# Patient Record
Sex: FEMALE | Race: WHITE | Employment: OTHER
[De-identification: names, ages, dates, MRNs, and addresses within clinical notes are randomized per-mention and may not be internally consistent; named-entity substitution may affect disease eponyms.]

---

## 2017-05-30 ENCOUNTER — RX ONLY (RX ONLY)
Age: 62
End: 2017-05-30

## 2017-05-30 ENCOUNTER — DOCTOR'S OFFICE (OUTPATIENT)
Dept: URBAN - NONMETROPOLITAN AREA CLINIC 1 | Facility: CLINIC | Age: 62
Setting detail: OPHTHALMOLOGY
End: 2017-05-30
Payer: COMMERCIAL

## 2017-05-30 DIAGNOSIS — H25.13: ICD-10-CM

## 2017-05-30 DIAGNOSIS — H43.813: ICD-10-CM

## 2017-05-30 DIAGNOSIS — H16.222: ICD-10-CM

## 2017-05-30 DIAGNOSIS — H16.223: ICD-10-CM

## 2017-05-30 DIAGNOSIS — H40.003: ICD-10-CM

## 2017-05-30 DIAGNOSIS — H40.033: ICD-10-CM

## 2017-05-30 PROCEDURE — 92014 COMPRE OPH EXAM EST PT 1/>: CPT | Performed by: OPHTHALMOLOGY

## 2017-05-30 PROCEDURE — 83861 MICROFLUID ANALY TEARS: CPT | Performed by: OPHTHALMOLOGY

## 2017-05-30 ASSESSMENT — REFRACTION_MANIFEST
OD_VA3: 20/
OD_VA3: 20/
OD_AXIS: 120
OD_VA2: 20/25-2
OD_VA1: 20/25-2
OS_VA1: 20/
OD_SPHERE: PLANO
OU_VA: 20/
OS_ADD: +2.50
OU_VA: 20/
OD_VA1: 20/
OD_VA2: 20/
OD_ADD: +2.50
OS_SPHERE: PLANO
OS_VA3: 20/
OS_VA1: 20/25-2
OD_VA3: 20/
OD_VA2: 20/
OS_VA3: 20/
OU_VA: 20/
OS_VA3: 20/
OS_AXIS: 061
OS_VA2: 20/25-2
OS_VA2: 20/
OD_CYLINDER: -2.50
OD_VA1: 20/
OS_CYLINDER: -1.50
OS_VA2: 20/
OS_VA1: 20/

## 2017-05-30 ASSESSMENT — CONFRONTATIONAL VISUAL FIELD TEST (CVF)
OD_FINDINGS: FULL
OS_FINDINGS: FULL

## 2017-05-30 ASSESSMENT — REFRACTION_CURRENTRX
OS_AXIS: 77
OD_ADD: +2.50
OD_AXIS: 119
OS_VPRISM_DIRECTION: PROGS
OS_ADD: +2.50
OD_CYLINDER: -2.25
OS_SPHERE: +0.25
OD_SPHERE: +0.25
OD_OVR_VA: 20/
OS_OVR_VA: 20/
OD_OVR_VA: 20/
OS_OVR_VA: 20/
OS_CYLINDER: -1.25
OD_VPRISM_DIRECTION: PROGS
OD_OVR_VA: 20/
OS_OVR_VA: 20/

## 2017-05-30 ASSESSMENT — SPHEQUIV_DERIVED
OS_SPHEQUIV: -0.125
OD_SPHEQUIV: -0.75

## 2017-05-30 ASSESSMENT — VISUAL ACUITY
OS_BCVA: 20/40+2
OD_BCVA: 20/40-2

## 2017-05-30 ASSESSMENT — REFRACTION_AUTOREFRACTION
OS_SPHERE: +0.50
OS_CYLINDER: -1.25
OD_CYLINDER: -2.50
OD_AXIS: 120
OD_SPHERE: +0.50
OS_AXIS: 61

## 2017-06-05 ENCOUNTER — DOCTOR'S OFFICE (OUTPATIENT)
Dept: URBAN - NONMETROPOLITAN AREA CLINIC 1 | Facility: CLINIC | Age: 62
Setting detail: OPHTHALMOLOGY
End: 2017-06-05
Payer: COMMERCIAL

## 2017-06-05 ENCOUNTER — OPTICAL OFFICE (OUTPATIENT)
Dept: URBAN - NONMETROPOLITAN AREA CLINIC 4 | Facility: CLINIC | Age: 62
Setting detail: OPHTHALMOLOGY
End: 2017-06-05
Payer: COMMERCIAL

## 2017-06-05 DIAGNOSIS — H52.4: ICD-10-CM

## 2017-06-05 DIAGNOSIS — H52.223: ICD-10-CM

## 2017-06-05 PROCEDURE — V2750 ANTI-REFLECTIVE COATING: HCPCS | Performed by: OPTOMETRIST

## 2017-06-05 PROCEDURE — V2020 VISION SVCS FRAMES PURCHASES: HCPCS | Performed by: OPTOMETRIST

## 2017-06-05 PROCEDURE — 92015 DETERMINE REFRACTIVE STATE: CPT | Performed by: OPTOMETRIST

## 2017-06-05 PROCEDURE — V2781 PROGRESSIVE LENS PER LENS: HCPCS | Performed by: OPTOMETRIST

## 2017-06-05 ASSESSMENT — REFRACTION_AUTOREFRACTION
OS_AXIS: 61
OS_CYLINDER: -1.25
OD_AXIS: 120
OS_SPHERE: +0.50
OD_SPHERE: +0.50
OD_CYLINDER: -2.50

## 2017-06-05 ASSESSMENT — VISUAL ACUITY
OS_BCVA: 20/40+2
OD_BCVA: 20/40-2

## 2017-06-05 ASSESSMENT — REFRACTION_MANIFEST
OS_VA3: 20/
OD_VA3: 20/
OD_VA1: 20/
OD_VA3: 20/
OS_VA1: 20/
OS_VA2: 20/
OU_VA: 20/
OS_VA1: 20/
OD_VA2: 20/
OD_VA1: 20/
OD_VA2: 20/
OS_VA2: 20/
OS_VA3: 20/
OU_VA: 20/

## 2017-06-05 ASSESSMENT — REFRACTION_OUTSIDERX
OS_CYLINDER: -1.50
OD_VA2: 20/25-2
OD_AXIS: 120
OS_ADD: +2.50
OS_AXIS: 061
OS_SPHERE: +0.50
OD_VA1: 20/25-2
OS_VA1: 20/25-2
OU_VA: 20/
OS_VA3: 20/
OD_SPHERE: +0.50
OD_VA3: 20/
OD_CYLINDER: -2.50
OD_ADD: +2.50
OS_VA2: 20/25-2

## 2017-06-05 ASSESSMENT — REFRACTION_CURRENTRX
OS_SPHERE: +0.25
OS_OVR_VA: 20/
OS_VPRISM_DIRECTION: PROGS
OD_OVR_VA: 20/
OD_VPRISM_DIRECTION: PROGS
OD_SPHERE: +0.25
OS_OVR_VA: 20/
OD_OVR_VA: 20/
OS_CYLINDER: -1.25
OD_ADD: +2.50
OS_ADD: +2.50
OD_OVR_VA: 20/
OS_OVR_VA: 20/
OD_CYLINDER: -2.25
OD_AXIS: 119
OS_AXIS: 77

## 2017-06-05 ASSESSMENT — SPHEQUIV_DERIVED
OS_SPHEQUIV: -0.125
OD_SPHEQUIV: -0.75

## 2017-09-22 ENCOUNTER — OPTICAL OFFICE (OUTPATIENT)
Dept: URBAN - NONMETROPOLITAN AREA CLINIC 4 | Facility: CLINIC | Age: 62
Setting detail: OPHTHALMOLOGY
End: 2017-09-22

## 2017-09-22 DIAGNOSIS — H52.223: ICD-10-CM

## 2017-09-22 PROCEDURE — V2781 PROGRESSIVE LENS PER LENS: HCPCS | Performed by: OPTOMETRIST

## 2017-09-22 PROCEDURE — V2750 ANTI-REFLECTIVE COATING: HCPCS | Performed by: OPTOMETRIST

## 2018-08-21 ENCOUNTER — OPTICAL OFFICE (OUTPATIENT)
Dept: URBAN - NONMETROPOLITAN AREA CLINIC 4 | Facility: CLINIC | Age: 63
Setting detail: OPHTHALMOLOGY
End: 2018-08-21
Payer: COMMERCIAL

## 2018-08-21 ENCOUNTER — DOCTOR'S OFFICE (OUTPATIENT)
Dept: URBAN - NONMETROPOLITAN AREA CLINIC 1 | Facility: CLINIC | Age: 63
Setting detail: OPHTHALMOLOGY
End: 2018-08-21
Payer: COMMERCIAL

## 2018-08-21 DIAGNOSIS — H52.4: ICD-10-CM

## 2018-08-21 DIAGNOSIS — H52.223: ICD-10-CM

## 2018-08-21 PROBLEM — H43.813 POST VITREOUS DETACHMENT: Status: RESOLVED | Noted: 2017-05-30 | Resolved: 2018-08-21

## 2018-08-21 PROCEDURE — 92014 COMPRE OPH EXAM EST PT 1/>: CPT | Performed by: OPTOMETRIST

## 2018-08-21 PROCEDURE — V2750 ANTI-REFLECTIVE COATING: HCPCS | Performed by: OPTOMETRIST

## 2018-08-21 PROCEDURE — V2781 PROGRESSIVE LENS PER LENS: HCPCS | Performed by: OPTOMETRIST

## 2018-08-21 PROCEDURE — V2020 VISION SVCS FRAMES PURCHASES: HCPCS | Performed by: OPTOMETRIST

## 2018-08-21 ASSESSMENT — REFRACTION_OUTSIDERX
OD_AXIS: 120
OS_VA1: 20/25-2
OS_ADD: +2.50
OD_VA2: 20/25-2
OS_VA2: 20/25-2
OS_AXIS: 061
OS_VA3: 20/
OD_ADD: +2.50
OD_VA1: 20/25-2
OS_CYLINDER: -1.50
OD_SPHERE: +0.50
OU_VA: 20/
OD_VA3: 20/
OD_CYLINDER: -2.50
OS_SPHERE: +0.50

## 2018-08-21 ASSESSMENT — REFRACTION_CURRENTRX
OD_SPHERE: +0.50
OS_OVR_VA: 20/
OS_OVR_VA: 20/
OD_ADD: +2.50
OD_OVR_VA: 20/
OD_OVR_VA: 20/
OD_CYLINDER: -2.50
OS_VPRISM_DIRECTION: PROGS
OD_OVR_VA: 20/
OS_AXIS: 058
OD_VPRISM_DIRECTION: PROGS
OD_AXIS: 115
OS_SPHERE: +0.50
OS_ADD: +2.50
OS_CYLINDER: -1.50
OS_OVR_VA: 20/

## 2018-08-21 ASSESSMENT — VISUAL ACUITY
OS_BCVA: 20/30-1
OD_BCVA: 20/40-1

## 2018-08-21 ASSESSMENT — REFRACTION_MANIFEST
OS_VA1: 20/
OS_VA2: 20/
OD_VA1: 20/
OS_VA3: 20/
OS_VA3: 20/
OD_VA2: 20/
OD_VA2: 20/
OS_VA1: 20/
OS_VA2: 20/
OU_VA: 20/
OD_VA1: 20/
OU_VA: 20/
OD_VA3: 20/
OD_VA3: 20/

## 2018-08-21 ASSESSMENT — REFRACTION_AUTOREFRACTION
OD_SPHERE: +1.00
OS_AXIS: 071
OS_SPHERE: +0.75
OS_CYLINDER: -1.00
OD_AXIS: 120
OD_CYLINDER: -2.00

## 2018-08-21 ASSESSMENT — SUPERFICIAL PUNCTATE KERATITIS (SPK)
OD_SPK: 1+ 2+
OS_SPK: 1+ 2+

## 2018-08-21 ASSESSMENT — SPHEQUIV_DERIVED
OS_SPHEQUIV: 0.25
OD_SPHEQUIV: 0

## 2018-08-21 ASSESSMENT — CONFRONTATIONAL VISUAL FIELD TEST (CVF)
OS_FINDINGS: FULL
OD_FINDINGS: FULL

## 2019-07-09 ENCOUNTER — DOCTOR'S OFFICE (OUTPATIENT)
Dept: URBAN - NONMETROPOLITAN AREA CLINIC 1 | Facility: CLINIC | Age: 64
Setting detail: OPHTHALMOLOGY
End: 2019-07-09
Payer: COMMERCIAL

## 2019-07-09 DIAGNOSIS — H40.003: ICD-10-CM

## 2019-07-09 PROCEDURE — 92083 EXTENDED VISUAL FIELD XM: CPT | Performed by: OPTOMETRIST

## 2019-07-09 PROCEDURE — 76514 ECHO EXAM OF EYE THICKNESS: CPT | Performed by: OPTOMETRIST

## 2019-08-26 ENCOUNTER — OPTICAL OFFICE (OUTPATIENT)
Dept: URBAN - NONMETROPOLITAN AREA CLINIC 4 | Facility: CLINIC | Age: 64
Setting detail: OPHTHALMOLOGY
End: 2019-08-26
Payer: COMMERCIAL

## 2019-08-26 ENCOUNTER — OPTICAL OFFICE (OUTPATIENT)
Dept: URBAN - NONMETROPOLITAN AREA CLINIC 4 | Facility: CLINIC | Age: 64
Setting detail: OPHTHALMOLOGY
End: 2019-08-26

## 2019-08-26 ENCOUNTER — DOCTOR'S OFFICE (OUTPATIENT)
Dept: URBAN - NONMETROPOLITAN AREA CLINIC 1 | Facility: CLINIC | Age: 64
Setting detail: OPHTHALMOLOGY
End: 2019-08-26
Payer: COMMERCIAL

## 2019-08-26 DIAGNOSIS — H52.223: ICD-10-CM

## 2019-08-26 DIAGNOSIS — H52.4: ICD-10-CM

## 2019-08-26 PROCEDURE — V2781 PROGRESSIVE LENS PER LENS: HCPCS | Performed by: OPTOMETRIST

## 2019-08-26 PROCEDURE — V2203 LENS SPHCYL BIFOCAL 4.00D/.1: HCPCS | Performed by: OPTOMETRIST

## 2019-08-26 PROCEDURE — 92014 COMPRE OPH EXAM EST PT 1/>: CPT | Performed by: OPTOMETRIST

## 2019-08-26 PROCEDURE — V2750 ANTI-REFLECTIVE COATING: HCPCS | Performed by: OPTOMETRIST

## 2019-08-26 PROCEDURE — V2762 POLARIZATION, ANY LENS: HCPCS | Performed by: OPTOMETRIST

## 2019-08-26 PROCEDURE — V2204 LENS SPHCY BIFOCAL 4.00D/2.1: HCPCS | Performed by: OPTOMETRIST

## 2019-08-26 PROCEDURE — V2020 VISION SVCS FRAMES PURCHASES: HCPCS | Performed by: OPTOMETRIST

## 2019-08-26 ASSESSMENT — REFRACTION_MANIFEST
OD_ADD: +2.50
OD_VA1: 20/
OS_VA3: 20/
OD_SPHERE: +0.75
OS_VA1: 20/
OS_CYLINDER: -1.50
OD_VA2: 20/25-2
OU_VA: 20/
OS_VA2: 20/30-2
OD_CYLINDER: -2.75
OD_VA3: 20/
OS_SPHERE: +0.50
OD_VA2: 20/
OS_AXIS: 061
OU_VA: 20/
OS_VA3: 20/
OD_VA1: 20/30+2
OS_VA2: 20/
OS_VA1: 20/30-2
OD_VA3: 20/
OS_ADD: +2.50
OD_AXIS: 120

## 2019-08-26 ASSESSMENT — REFRACTION_CURRENTRX
OS_VPRISM_DIRECTION: PROGS
OS_OVR_VA: 20/
OS_ADD: +2.50
OD_ADD: +2.50
OS_OVR_VA: 20/
OS_CYLINDER: -1.75
OS_SPHERE: +1.50
OS_OVR_VA: 20/
OD_SPHERE: +0.50
OD_OVR_VA: 20/
OD_OVR_VA: 20/
OD_VPRISM_DIRECTION: PROGS
OD_CYLINDER: -2.50
OD_AXIS: 117
OS_AXIS: 087
OD_OVR_VA: 20/

## 2019-08-26 ASSESSMENT — CONFRONTATIONAL VISUAL FIELD TEST (CVF)
OD_FINDINGS: FULL
OS_FINDINGS: FULL

## 2019-08-26 ASSESSMENT — SUPERFICIAL PUNCTATE KERATITIS (SPK)
OS_SPK: 1+ 2+
OD_SPK: 1+ 2+

## 2019-08-26 ASSESSMENT — VISUAL ACUITY
OD_BCVA: 20/40
OS_BCVA: 20/30

## 2019-08-26 ASSESSMENT — SPHEQUIV_DERIVED
OS_SPHEQUIV: -0.25
OS_SPHEQUIV: -0.25
OD_SPHEQUIV: -0.625
OD_SPHEQUIV: -0.25

## 2019-08-26 ASSESSMENT — REFRACTION_AUTOREFRACTION
OS_AXIS: 061
OD_SPHERE: +1.25
OD_AXIS: 121
OS_SPHERE: +0.50
OS_CYLINDER: -1.50
OD_CYLINDER: -3.00

## 2020-01-29 ENCOUNTER — HOSPITAL ENCOUNTER (OUTPATIENT)
Facility: HOSPITAL | Age: 65
Setting detail: OBSERVATION
Discharge: HOME | End: 2020-01-30
Attending: EMERGENCY MEDICINE | Admitting: INTERNAL MEDICINE
Payer: COMMERCIAL

## 2020-01-29 ENCOUNTER — APPOINTMENT (EMERGENCY)
Dept: RADIOLOGY | Facility: HOSPITAL | Age: 65
End: 2020-01-29
Payer: COMMERCIAL

## 2020-01-29 DIAGNOSIS — R07.9 CHEST PAIN: Primary | ICD-10-CM

## 2020-01-29 PROBLEM — E78.5 HYPERLIPIDEMIA: Status: ACTIVE | Noted: 2020-01-29

## 2020-01-29 PROBLEM — Z86.79 HISTORY OF CEREBRAL ANEURYSM REPAIR: Status: ACTIVE | Noted: 2020-01-29

## 2020-01-29 PROBLEM — Z98.890 HISTORY OF CEREBRAL ANEURYSM REPAIR: Status: ACTIVE | Noted: 2020-01-29

## 2020-01-29 PROBLEM — Z72.0 TOBACCO ABUSE: Status: ACTIVE | Noted: 2020-01-29

## 2020-01-29 LAB
ALBUMIN SERPL BCP-MCNC: 3.9 G/DL (ref 3.5–5)
ALP SERPL-CCNC: 74 U/L (ref 46–116)
ALT SERPL W P-5'-P-CCNC: 41 U/L (ref 12–78)
ANION GAP SERPL CALCULATED.3IONS-SCNC: 9 MMOL/L (ref 4–13)
AST SERPL W P-5'-P-CCNC: 24 U/L (ref 5–45)
BASOPHILS # BLD AUTO: 0.06 THOUSANDS/ΜL (ref 0–0.1)
BASOPHILS NFR BLD AUTO: 1 % (ref 0–1)
BILIRUB SERPL-MCNC: 0.38 MG/DL (ref 0.2–1)
BUN SERPL-MCNC: 20 MG/DL (ref 5–25)
CALCIUM SERPL-MCNC: 8.6 MG/DL (ref 8.3–10.1)
CHLORIDE SERPL-SCNC: 103 MMOL/L (ref 100–108)
CO2 SERPL-SCNC: 27 MMOL/L (ref 21–32)
CREAT SERPL-MCNC: 0.6 MG/DL (ref 0.6–1.3)
EOSINOPHIL # BLD AUTO: 0.2 THOUSAND/ΜL (ref 0–0.61)
EOSINOPHIL NFR BLD AUTO: 2 % (ref 0–6)
ERYTHROCYTE [DISTWIDTH] IN BLOOD BY AUTOMATED COUNT: 13 % (ref 11.6–15.1)
GFR SERPL CREATININE-BSD FRML MDRD: 98 ML/MIN/1.73SQ M
GLUCOSE SERPL-MCNC: 101 MG/DL (ref 65–140)
HCT VFR BLD AUTO: 41.8 % (ref 34.8–46.1)
HGB BLD-MCNC: 14.5 G/DL (ref 11.5–15.4)
IMM GRANULOCYTES # BLD AUTO: 0.03 THOUSAND/UL (ref 0–0.2)
IMM GRANULOCYTES NFR BLD AUTO: 0 % (ref 0–2)
LYMPHOCYTES # BLD AUTO: 3.12 THOUSANDS/ΜL (ref 0.6–4.47)
LYMPHOCYTES NFR BLD AUTO: 36 % (ref 14–44)
MCH RBC QN AUTO: 31.4 PG (ref 26.8–34.3)
MCHC RBC AUTO-ENTMCNC: 34.7 G/DL (ref 31.4–37.4)
MCV RBC AUTO: 91 FL (ref 82–98)
MONOCYTES # BLD AUTO: 0.73 THOUSAND/ΜL (ref 0.17–1.22)
MONOCYTES NFR BLD AUTO: 8 % (ref 4–12)
NEUTROPHILS # BLD AUTO: 4.57 THOUSANDS/ΜL (ref 1.85–7.62)
NEUTS SEG NFR BLD AUTO: 53 % (ref 43–75)
NRBC BLD AUTO-RTO: 0 /100 WBCS
PLATELET # BLD AUTO: 278 THOUSANDS/UL (ref 149–390)
PLATELET # BLD AUTO: 295 THOUSANDS/UL (ref 149–390)
PMV BLD AUTO: 10 FL (ref 8.9–12.7)
PMV BLD AUTO: 10 FL (ref 8.9–12.7)
POTASSIUM SERPL-SCNC: 3.7 MMOL/L (ref 3.5–5.3)
PROT SERPL-MCNC: 7.1 G/DL (ref 6.4–8.2)
RBC # BLD AUTO: 4.62 MILLION/UL (ref 3.81–5.12)
SODIUM SERPL-SCNC: 139 MMOL/L (ref 136–145)
TROPONIN I SERPL-MCNC: <0.02 NG/ML
WBC # BLD AUTO: 8.71 THOUSAND/UL (ref 4.31–10.16)

## 2020-01-29 PROCEDURE — 36415 COLL VENOUS BLD VENIPUNCTURE: CPT | Performed by: EMERGENCY MEDICINE

## 2020-01-29 PROCEDURE — 99285 EMERGENCY DEPT VISIT HI MDM: CPT | Performed by: EMERGENCY MEDICINE

## 2020-01-29 PROCEDURE — 71046 X-RAY EXAM CHEST 2 VIEWS: CPT

## 2020-01-29 PROCEDURE — 85025 COMPLETE CBC W/AUTO DIFF WBC: CPT | Performed by: EMERGENCY MEDICINE

## 2020-01-29 PROCEDURE — 84484 ASSAY OF TROPONIN QUANT: CPT | Performed by: EMERGENCY MEDICINE

## 2020-01-29 PROCEDURE — 80053 COMPREHEN METABOLIC PANEL: CPT | Performed by: EMERGENCY MEDICINE

## 2020-01-29 PROCEDURE — 93005 ELECTROCARDIOGRAM TRACING: CPT

## 2020-01-29 PROCEDURE — 99220 PR INITIAL OBSERVATION CARE/DAY 70 MINUTES: CPT | Performed by: INTERNAL MEDICINE

## 2020-01-29 PROCEDURE — 85049 AUTOMATED PLATELET COUNT: CPT | Performed by: INTERNAL MEDICINE

## 2020-01-29 PROCEDURE — 99285 EMERGENCY DEPT VISIT HI MDM: CPT

## 2020-01-29 PROCEDURE — 84484 ASSAY OF TROPONIN QUANT: CPT | Performed by: INTERNAL MEDICINE

## 2020-01-29 RX ORDER — ASPIRIN 81 MG/1
81 TABLET, CHEWABLE ORAL DAILY
Status: DISCONTINUED | OUTPATIENT
Start: 2020-01-30 | End: 2020-01-30 | Stop reason: HOSPADM

## 2020-01-29 RX ORDER — NITROGLYCERIN 0.4 MG/1
0.4 TABLET SUBLINGUAL
Status: DISCONTINUED | OUTPATIENT
Start: 2020-01-29 | End: 2020-01-30 | Stop reason: HOSPADM

## 2020-01-29 RX ORDER — ALPRAZOLAM 0.5 MG/1
0.5 TABLET ORAL ONCE
Status: COMPLETED | OUTPATIENT
Start: 2020-01-29 | End: 2020-01-29

## 2020-01-29 RX ORDER — CHLORAL HYDRATE 500 MG
1 CAPSULE ORAL DAILY
COMMUNITY
Start: 2014-03-11

## 2020-01-29 RX ORDER — TRAZODONE HYDROCHLORIDE 50 MG/1
50 TABLET ORAL
Status: DISCONTINUED | OUTPATIENT
Start: 2020-01-29 | End: 2020-01-30 | Stop reason: HOSPADM

## 2020-01-29 RX ORDER — SODIUM CHLORIDE 9 MG/ML
3 INJECTION INTRAVENOUS AS NEEDED
Status: DISCONTINUED | OUTPATIENT
Start: 2020-01-29 | End: 2020-01-30 | Stop reason: HOSPADM

## 2020-01-29 RX ORDER — PANTOPRAZOLE SODIUM 40 MG/1
40 TABLET, DELAYED RELEASE ORAL
Status: DISCONTINUED | OUTPATIENT
Start: 2020-01-30 | End: 2020-01-30 | Stop reason: HOSPADM

## 2020-01-29 RX ORDER — CHLORAL HYDRATE 500 MG
1000 CAPSULE ORAL DAILY
Status: DISCONTINUED | OUTPATIENT
Start: 2020-01-30 | End: 2020-01-30 | Stop reason: HOSPADM

## 2020-01-29 RX ORDER — VITAMIN E 268 MG
400 CAPSULE ORAL DAILY
COMMUNITY
Start: 2014-03-11

## 2020-01-29 RX ORDER — LANSOPRAZOLE 30 MG/1
CAPSULE, DELAYED RELEASE ORAL DAILY
COMMUNITY
Start: 2019-11-01

## 2020-01-29 RX ORDER — TRAZODONE HYDROCHLORIDE 50 MG/1
50 TABLET ORAL
COMMUNITY
Start: 2019-11-29

## 2020-01-29 RX ORDER — FLUOXETINE HYDROCHLORIDE 20 MG/1
40 CAPSULE ORAL DAILY
Status: DISCONTINUED | OUTPATIENT
Start: 2020-01-30 | End: 2020-01-30 | Stop reason: HOSPADM

## 2020-01-29 RX ORDER — PHENOL 1.4 %
1200 AEROSOL, SPRAY (ML) MUCOUS MEMBRANE
COMMUNITY
Start: 2014-03-11

## 2020-01-29 RX ORDER — PHYTONADIONE 5 MG/1
5 TABLET ORAL ONCE
COMMUNITY
End: 2020-01-30 | Stop reason: HOSPADM

## 2020-01-29 RX ORDER — RAMELTEON 8 MG/1
8 TABLET ORAL
COMMUNITY
Start: 2019-12-31

## 2020-01-29 RX ORDER — MULTIVIT,IRON,MINERALS/LUTEIN
1 TABLET ORAL DAILY
COMMUNITY
Start: 2014-03-11

## 2020-01-29 RX ORDER — CLOBETASOL PROPIONATE 0.5 MG/G
CREAM TOPICAL
Status: DISCONTINUED | OUTPATIENT
Start: 2020-01-29 | End: 2020-01-29 | Stop reason: CLARIF

## 2020-01-29 RX ORDER — GINGER ROOT
1 POWDER (GRAM) MISCELLANEOUS DAILY
COMMUNITY
Start: 2014-03-11 | End: 2020-01-30 | Stop reason: HOSPADM

## 2020-01-29 RX ORDER — RAMELTEON 8 MG/1
8 TABLET ORAL
Status: DISCONTINUED | OUTPATIENT
Start: 2020-01-29 | End: 2020-01-30 | Stop reason: HOSPADM

## 2020-01-29 RX ORDER — ACETAMINOPHEN 325 MG/1
650 TABLET ORAL EVERY 6 HOURS PRN
Status: DISCONTINUED | OUTPATIENT
Start: 2020-01-29 | End: 2020-01-30 | Stop reason: HOSPADM

## 2020-01-29 RX ORDER — FLUOXETINE HYDROCHLORIDE 40 MG/1
CAPSULE ORAL DAILY
COMMUNITY
Start: 2019-11-01

## 2020-01-29 RX ORDER — VALACYCLOVIR HYDROCHLORIDE 1 G/1
1000 TABLET, FILM COATED ORAL AS NEEDED
COMMUNITY
Start: 2019-11-26 | End: 2020-01-30 | Stop reason: HOSPADM

## 2020-01-29 RX ORDER — SIMVASTATIN 40 MG
40 TABLET ORAL
COMMUNITY
Start: 2020-01-13

## 2020-01-29 RX ORDER — ALPRAZOLAM 1 MG/1
TABLET ORAL
COMMUNITY
Start: 2019-10-09

## 2020-01-29 RX ORDER — CALCIUM CARBONATE 500(1250)
1 TABLET ORAL
Status: DISCONTINUED | OUTPATIENT
Start: 2020-01-30 | End: 2020-01-30 | Stop reason: HOSPADM

## 2020-01-29 RX ORDER — B-COMPLEX WITH VITAMIN C
2 TABLET ORAL DAILY
COMMUNITY
Start: 2014-03-11

## 2020-01-29 RX ORDER — ATORVASTATIN CALCIUM 40 MG/1
40 TABLET, FILM COATED ORAL
Status: DISCONTINUED | OUTPATIENT
Start: 2020-01-29 | End: 2020-01-30

## 2020-01-29 RX ADMIN — NITROGLYCERIN 0.4 MG: 0.4 TABLET SUBLINGUAL at 14:54

## 2020-01-29 RX ADMIN — HALOBETASOL PROPIONATE 1 APPLICATION: 0.5 CREAM TOPICAL at 18:28

## 2020-01-29 RX ADMIN — ALPRAZOLAM 0.5 MG: 0.5 TABLET ORAL at 23:15

## 2020-01-29 RX ADMIN — TRAZODONE HYDROCHLORIDE 50 MG: 50 TABLET ORAL at 21:46

## 2020-01-29 RX ADMIN — ATORVASTATIN CALCIUM 40 MG: 40 TABLET, FILM COATED ORAL at 17:23

## 2020-01-29 NOTE — ASSESSMENT & PLAN NOTE
Serial cardiac enzymes  Monitor symptoms closely  Telemetry  Cardiology consultation  ? Outpatient stress  Troponins negative

## 2020-01-29 NOTE — H&P
H&P- Flores Hopkins 1955, 60 y o  female MRN: [id number]    Unit/Bed#: -01 Encounter: [id number]    Primary Care Provider: Xochilt Gupta MD   Date and time admitted to hospital: 1/29/2020  1:53 PM        * Chest pain  Assessment & Plan  Serial cardiac enzymes  Monitor symptoms closely  Telemetry  Cardiology consultation  Tobacco abuse  Assessment & Plan  1/4 pack for 25 years    History of cerebral aneurysm repair  Assessment & Plan  Patient with prior repair in 1996    Hyperlipidemia  Assessment & Plan   Zocor      VTE Prophylaxis: Enoxaparin (Lovenox)  / sequential compression device   Code Status:  Full code  POLST: There is no POLST form on file for this patient (pre-hospital)      Anticipated Length of Stay:  Patient will be admitted on an Observation basis with an anticipated length of stay of  less 2 midnights  Justification for Hospital Stay:  Need to monitor cardiac symptoms    Total Time for Visit, including Counseling / Coordination of Care: 45 minutes  Greater than 50% of this total time spent on direct patient counseling and coordination of care  Chief Complaint:  Chest pain    History of Present Illness:    Flores Hopkins is a 60 y o  female who presents with symptoms of moderate chest discomfort  Patient presents after calling family physician referred emergency room department for evaluation chest pain  Reportedly started  earlier this morning  Patient reports that while seated at a desk she began having bilateral mandibular pain with associated retrosternal discomfort radiating into the inferior aspect of the left upper extremity  The symptoms resolved within 2 to 3 minutes  He had a 2nd episode at 11:00 a m  [place] Patient also notes a history of exertional dyspnea and states that symptoms are ongoing for years    He reportedly had a heart catheterization approximately 15 years ago after abnormal stress that was reported to be normal   He received no interventions/stents  He also has a remote history of aneurysm repair back in 1996  She describes intermittent symptoms on and off for last 15 years similar to this one  She denies any relationship to activity  She reports that she would developed bilateral jaw pain in conjunction with left arm pain and chest discomfort during the episodes  Review of Systems:    Review of Systems   Constitutional: Negative for activity change and appetite change  HENT: Negative for congestion and dental problem  Respiratory: Negative for apnea and chest tightness  Cardiovascular: Positive for chest pain  Negative for leg swelling  Genitourinary: Negative for difficulty urinating and dyspareunia  Musculoskeletal: Negative for arthralgias and back pain  Neurological: Negative for dizziness  Psychiatric/Behavioral: Negative for agitation and behavioral problems  All other systems reviewed and are negative  Past Medical and Surgical History:     Past Medical History:   Diagnosis Date    Cerebral aneurysm     Hyperlipidemia        Past Surgical History:   Procedure Laterality Date    CARDIAC CATHETERIZATION      2005    CEREBRAL ANEURYSM REPAIR  07/1997    at [facility]         Meds/Allergies:    Prior to Admission medications    Medication Sig Start Date End Date Taking?  Authorizing Provider   ALPRAZolam Scott Azevedo) 1 mg tablet Pt states, takes for anxiety prn 10/9/19  Yes Historical Provider, MD   B Complex Vitamins (VITAMIN B COMPLEX) TABS Take by mouth 3/11/14  Yes Historical Provider, MD   calcium carbonate (CALCIUM 600) 600 MG tablet Take by mouth 3/11/14  Yes Historical Provider, MD   Cholecalciferol (D 1000) 25 MCG (1000 UT) capsule Take by mouth 3/11/14  Yes Historical Provider, MD   FLUoxetine (PROZAC) 40 MG capsule Take by mouth 11/1/19  Yes Historical Provider, MD Roma Jang POWD by Does not apply route 3/11/14  Yes Historical Provider, MD   lansoprazole (PREVACID) 30 mg capsule Take by mouth 11/1/19  Yes Historical Provider, MD   Magnesium 100 MG CAPS Take by mouth 3/11/14  Yes Historical Provider, MD   Mirabegron ER (Bearl Armendariz) 50 MG TB24 Take by mouth 9/23/19  Yes Historical Provider, MD   Multiple Vitamins-Minerals (CENTRUM SILVER ULTRA WOMENS) TABS Take by mouth 3/11/14  Yes Historical Provider, MD   Omega-3 Fatty Acids (OMEGA-3 FISH OIL) 1000 MG CAPS Take by mouth 3/11/14  Yes Historical Provider, MD   phytonadione (MEPHYTON) 5 mg tablet Take 5 mg by mouth once   Yes Historical Provider, MD   progesterone ([address]) 200 MG capsule Take by mouth 5/23/19  Yes Historical Provider, MD   ramelteon (ROZEREM) 8 mg tablet  12/31/19  Yes Historical Provider, MD   simvastatin (ZOCOR) 40 mg tablet Take by mouth 1/13/20  Yes Historical Provider, MD   traZODone (DESYREL) 50 mg tablet Take by mouth 11/29/19  Yes Historical Provider, MD   valACYclovir (VALTREX) 1,000 mg tablet TAKE 2 TABLETS BY MOUTH EVERY 12 HOURS AS DIRECTED 11/26/19  Yes Historical Provider, MD   vitamin A 10,000 units capsule Take 10,000 Units by mouth 7/23/18  Yes Historical Provider, MD   vitamin E, tocopherol, 400 units capsule Take by mouth 3/11/14  Yes Historical Provider, MD     I have reviewed home medications with patient personally  Allergies: No Known Allergies    Social History:     Marital Status: /Civil Union     Patient Pre-hospital Living Situation home  Patient Pre-hospital Level of Mobility:  Ambulates  Patient Pre-hospital Diet Restrictions:  Denies  Substance Use History:   Social History     Substance and Sexual Activity   Alcohol Use Never    Frequency: Never     Social History     Tobacco Use   Smoking Status Current Every Day Smoker    Packs/day: 0 25    Years: 25 00    Pack years: 6 25   Smokeless Tobacco Never Used     Social History     Substance and Sexual Activity   Drug Use Never       Family History:    History reviewed  No pertinent family history      Physical Exam: Vitals:   Blood Pressure: 139/83 (01/29/20 1620)  Pulse: 62 (01/29/20 1620)  Temperature: (!) 97 3 °F (36 3 °C) (01/29/20 1620)  Temp Source: Temporal (01/29/20 1355)  Respirations: 18 (01/29/20 1530)  Height: 5' 7" (170 2 cm) (01/29/20 1620)  Weight - Scale: 95 7 kg (210 lb 15 7 oz) (01/29/20 1620)  SpO2: 98 % (01/29/20 1620)    Physical Exam   Constitutional: She appears well-developed and well-nourished  HENT:   Head: Normocephalic and atraumatic  Eyes: Pupils are equal, round, and reactive to light  EOM are normal        Additional Data:     Lab Results: I have personally reviewed pertinent reports  Results from last 7 days   Lab Units 01/29/20  1412   WBC Thousand/uL 8 71   HEMOGLOBIN g/dL 14 5   HEMATOCRIT % 41 8   PLATELETS Thousands/uL 295   NEUTROS PCT % 53   LYMPHS PCT % 36   MONOS PCT % 8   EOS PCT % 2     Results from last 7 days   Lab Units 01/29/20  1412   SODIUM mmol/L 139   POTASSIUM mmol/L 3 7   CHLORIDE mmol/L 103   CO2 mmol/L 27   BUN mg/dL 20   CREATININE mg/dL 0 60   ANION GAP mmol/L 9   CALCIUM mg/dL 8 6   ALBUMIN g/dL 3 9   TOTAL BILIRUBIN mg/dL 0 38   ALK PHOS U/L 74   ALT U/L 41   AST U/L 24   GLUCOSE RANDOM mg/dL 101                       Imaging: I have personally reviewed pertinent reports  X-ray chest 2 views   Final Result by Isatu Avelar MD (01/29 1432)      No acute cardiopulmonary disease  Workstation performed: [id number]             EKG, Pathology, and Other Studies Reviewed on Admission:   · EKG:  Normal sinus rhythm with no evidence of ST elevation or depression  ** Please Note: This note has been constructed using a voice recognition system   **

## 2020-01-29 NOTE — ED PROVIDER NOTES
History  Chief Complaint   Patient presents with    Chest Pain     "attacks" lasting a few minutes at a time  yesterday had 2 attacks, Attack is jaw pain, chest pain and heaviness and dull pain in her left arm  yesterday took an aspirin, went home and took a nap  lasted about an hour  Moderate chest discomfort persists today, woke with same  Notes she is feeling very fatigued  Called family physician and referred to emergency department for evaluation  Notes yesterday approximately 8:15 a m  While seated at a desk and episode of bilateral mandibular pain retrosternal discomfort radiating into inferior aspect of left upper extremity that was severe and resolved in 2-3 minute  Head 2nd note episode approximately 11:00 a m  That lasted for about an hour, left work and went home  Recently over the past few months has been exertionally dyspneic  States the symptoms are ongoing for years and had heart catheterization approximately 15 years ago after abnormal stress testing that was normal and received no stents  Denies headache and head injury, history of cerebral aneurysm repair 1996, another beginning in left retro optic area          Prior to Admission Medications   Prescriptions Last Dose Informant Patient Reported? Taking?    ALPRAZolam (XANAX) 1 mg tablet   Yes Yes   Sig: Pt states, takes for anxiety prn   B Complex Vitamins (VITAMIN B COMPLEX) TABS   Yes Yes   Sig: Take by mouth   Cholecalciferol (D 1000) 25 MCG (1000 UT) capsule   Yes Yes   Sig: Take by mouth   FLUoxetine (PROZAC) 40 MG capsule   Yes Yes   Sig: Take by mouth   Roma Root POWD   Yes Yes   Sig: by Does not apply route   Magnesium 100 MG CAPS   Yes Yes   Sig: Take by mouth   Mirabegron ER (MYRBETRIQ) 50 MG TB24   Yes Yes   Sig: Take by mouth   Multiple Vitamins-Minerals (CENTRUM SILVER ULTRA WOMENS) TABS   Yes Yes   Sig: Take by mouth   Omega-3 Fatty Acids (OMEGA-3 FISH OIL) 1000 MG CAPS   Yes Yes   Sig: Take by mouth   calcium carbonate (CALCIUM 600) 600 MG tablet   Yes Yes   Sig: Take by mouth   lansoprazole (PREVACID) 30 mg capsule   Yes Yes   Sig: Take by mouth   phytonadione (MEPHYTON) 5 mg tablet   Yes Yes   Sig: Take 5 mg by mouth once   progesterone (PROMETRIUM) 200 MG capsule   Yes Yes   Sig: Take by mouth   ramelteon (ROZEREM) 8 mg tablet   Yes Yes   simvastatin (ZOCOR) 40 mg tablet   Yes Yes   Sig: Take by mouth   traZODone (DESYREL) 50 mg tablet   Yes Yes   Sig: Take by mouth   valACYclovir (VALTREX) 1,000 mg tablet   Yes Yes   Sig: TAKE 2 TABLETS BY MOUTH EVERY 12 HOURS AS DIRECTED   vitamin A 10,000 units capsule   Yes Yes   Sig: Take 10,000 Units by mouth   vitamin E, tocopherol, 400 units capsule   Yes Yes   Sig: Take by mouth      Facility-Administered Medications: None       Past Medical History:   Diagnosis Date    Cerebral aneurysm     Hyperlipidemia        Past Surgical History:   Procedure Laterality Date    CARDIAC CATHETERIZATION      2005    CEREBRAL [facility]EURYSM [facility]PAIR  07/1997    at [facility]         History reviewed  No pertinent family history  I have reviewed and agree with the history as documented  Social History     Tobacco Use    Smoking status: Current Every Day Smoker     Packs/day: 0 25     Years: 25 00     Pack years: 6 25    Smokeless tobacco: Never Used   Substance Use Topics    Alcohol use: Never     Frequency: Never    Drug use: Never        Review of Systems   All other systems reviewed and are negative  Physical Exam  Physical Exam   Constitutional: She is oriented to person, place, and time  She appears well-developed and well-nourished  HENT:   Head: Normocephalic and atraumatic  Mouth/Throat: Oropharynx is clear and moist    Eyes: Pupils are equal, round, and reactive to light  Conjunctivae and EOM are normal    Neck: Neck supple  Cardiovascular: Normal rate, regular rhythm, normal heart sounds and intact distal pulses     Pulmonary/Chest: Effort normal and breath sounds normal    Abdominal: Soft  Bowel sounds are normal  She exhibits no distension  There is no tenderness  Musculoskeletal: Normal range of motion  Neurological: She is alert and oriented to person, place, and time  No cranial nerve deficit  Coordination normal    Skin: Skin is warm and dry  Psychiatric: She has a normal mood and affect  Her behavior is normal  Judgment and thought content normal    Nursing note and vitals reviewed        Vital Signs  ED Triage Vitals [01/29/20 1355]   Temperature Pulse Respirations Blood Pressure SpO2   98 5 °F (36 9 °C) 84 18 (!) 178/100 95 %      Temp Source Heart Rate Source Patient Position - Orthostatic VS BP Location FiO2 (%)   Temporal Monitor Lying Left arm --      Pain Score       5           Vitals:    01/29/20 1355 01/29/20 1403   BP: (!) 178/100 145/76   Pulse: 84 72   Patient Position - Orthostatic VS: Lying Lying         Visual Acuity      ED Medications  Medications   sodium chloride (PF) 0 9 % injection 3 mL (has no administration in time range)   nitroglycerin (NITROSTAT) SL tablet 0 4 mg (0 4 mg Sublingual Given 1/29/20 1454)       Diagnostic Studies  Results Reviewed     Procedure Component Value Units Date/Time    Troponin I [[id number]]  (Normal) Collected:  01/29/20 1412    Lab Status:  Final result Specimen:  Blood from Arm, Left Updated:  01/29/20 1441     Troponin I <0 02 ng/mL     Comprehensive metabolic panel [[id number]] Collected:  01/29/20 1412    Lab Status:  Final result Specimen:  Blood from Arm, Left Updated:  01/29/20 1439     Sodium 139 mmol/L      Potassium 3 7 mmol/L      Chloride 103 mmol/L      CO2 27 mmol/L      ANION GAP 9 mmol/L      BUN 20 mg/dL      Creatinine 0 60 mg/dL      Glucose 101 mg/dL      Calcium 8 6 mg/dL      AST 24 U/L      ALT 41 U/L      Alkaline Phosphatase 74 U/L      Total Protein 7 1 g/dL      Albumin 3 9 g/dL      Total Bilirubin 0 38 mg/dL      eGFR 98 ml/min/1 73sq m     Narrative:       National Kidney Disease Foundation guidelines for Chronic Kidney Disease (CKD):     Stage 1 with normal or high GFR (GFR > 90 mL/min/1 73 square meters)    Stage 2 Mild CKD (GFR = 60-89 mL/min/1 73 square meters)    Stage 3A Moderate CKD (GFR = 45-59 mL/min/1 73 square meters)    Stage 3B Moderate CKD (GFR = 30-44 mL/min/1 73 square meters)    Stage 4 Severe CKD (GFR = 15-29 mL/min/1 73 square meters)    Stage 5 End Stage CKD (GFR <15 mL/min/1 73 square meters)  Note: GFR calculation is accurate only with a steady state creatinine    CBC and differential [[id number]] Collected:  01/29/20 1412    Lab Status:  Final result Specimen:  Blood from Arm, Left Updated:  01/29/20 1423     WBC 8 71 Thousand/uL      RBC 4 62 Million/uL      Hemoglobin 14 5 g/dL      Hematocrit 41 8 %      MCV 91 fL      MCH 31 4 pg      MCHC 34 7 g/dL      RDW 13 0 %      MPV 10 0 fL      Platelets 399 Thousands/uL      nRBC 0 /100 WBCs      Neutrophils Relative 53 %      Immat GRANS % 0 %      Lymphocytes Relative 36 %      Monocytes Relative 8 %      Eosinophils Relative 2 %      Basophils Relative 1 %      Neutrophils Absolute 4 57 Thousands/µL      Immature Grans Absolute 0 03 Thousand/uL      Lymphocytes Absolute 3 12 Thousands/µL      Monocytes Absolute 0 73 Thousand/µL      Eosinophils Absolute 0 20 Thousand/µL      Basophils Absolute 0 06 Thousands/µL                  X-ray chest 2 views   Final Result by Claudia To MD (01/29 1432)      No acute cardiopulmonary disease  Workstation performed: [id number]                    Procedures  ECG 12 Lead Documentation Only  Date/Time: 1/29/2020 2:08 PM  Performed by: Sanjay Pickering DO  Authorized by: Sanjay Pickering DO     Comments:      1:59 p m   Normal sinus rhythm rate 72 normal axis normal intervals T-wave inversion V1, possible Q-wave V1 no ST elevation or depression             ED Course  ED Course as of Jan 29 1538   Wed Jan 29, 2020   1455 Chest pressure continues, nursing with   Nitroglycerin  We discussed results and agreeable with hospitalization  Hospitalist nurse practitioner/ivett texted      [id number] Hospitalist NP Ivett notes accepted by Sylwia Kiran / Nolan Pizarro                                  MDM      Disposition  Final diagnoses:   Chest pain     Time reflects when diagnosis was documented in both MDM as applicable and the Disposition within this note     Time User Action Codes Description Comment    1/29/2020  3:12 PM Shannon Gabriel S Add [R07 9] Chest pain     1/29/2020  3:12 PM Davian Murphy Modify [R07 9] Chest pain     1/29/2020  3:36 PM Hortensia Sorto Modify [R07 9] Chest pain       ED Disposition     ED Disposition Condition Date/Time Comment    Admit Stable Wed Jan 29, 2020  3:36 PM Case was discussed with Ivett and the patient's admission status was agreed to be Admission Status: observation status to the service of Dr Sylwia Kiran   Follow-up Information    None         Patient's Medications   Discharge Prescriptions    No medications on file     No discharge procedures on file      ED Provider  Electronically Signed by           Coby Gustafson DO  01/29/20 8903

## 2020-01-29 NOTE — ED NOTES
Pt ambulated to br without assistance  Strong and steady gait noted        Hong Amor, RN  01/29/20 4042

## 2020-01-30 ENCOUNTER — APPOINTMENT (OUTPATIENT)
Dept: NON INVASIVE DIAGNOSTICS | Facility: HOSPITAL | Age: 65
End: 2020-01-30
Payer: COMMERCIAL

## 2020-01-30 VITALS
HEIGHT: 67 IN | DIASTOLIC BLOOD PRESSURE: 72 MMHG | RESPIRATION RATE: 16 BRPM | SYSTOLIC BLOOD PRESSURE: 115 MMHG | HEART RATE: 73 BPM | OXYGEN SATURATION: 95 % | TEMPERATURE: 97.8 F | BODY MASS INDEX: 33.11 KG/M2 | WEIGHT: 210.98 LBS

## 2020-01-30 DIAGNOSIS — R06.09 OTHER FORMS OF DYSPNEA: ICD-10-CM

## 2020-01-30 DIAGNOSIS — R07.9 CHEST PAIN, UNSPECIFIED: ICD-10-CM

## 2020-01-30 DIAGNOSIS — E78.5 HYPERLIPIDEMIA, UNSPECIFIED: ICD-10-CM

## 2020-01-30 LAB
ALBUMIN SERPL BCP-MCNC: 3.8 G/DL (ref 3.5–5)
ALP SERPL-CCNC: 67 U/L (ref 46–116)
ALT SERPL W P-5'-P-CCNC: 37 U/L (ref 12–78)
ANION GAP SERPL CALCULATED.3IONS-SCNC: 8 MMOL/L (ref 4–13)
AST SERPL W P-5'-P-CCNC: 24 U/L (ref 5–45)
ATRIAL RATE: 72 BPM
BILIRUB SERPL-MCNC: 0.38 MG/DL (ref 0.2–1)
BUN SERPL-MCNC: 15 MG/DL (ref 5–25)
CALCIUM SERPL-MCNC: 8.8 MG/DL (ref 8.3–10.1)
CHLORIDE SERPL-SCNC: 106 MMOL/L (ref 100–108)
CO2 SERPL-SCNC: 27 MMOL/L (ref 21–32)
CREAT SERPL-MCNC: 0.66 MG/DL (ref 0.6–1.3)
ERYTHROCYTE [DISTWIDTH] IN BLOOD BY AUTOMATED COUNT: 12.8 % (ref 11.6–15.1)
GFR SERPL CREATININE-BSD FRML MDRD: 95 ML/MIN/1.73SQ M
GLUCOSE SERPL-MCNC: 100 MG/DL (ref 65–140)
HCT VFR BLD AUTO: 43.3 % (ref 34.8–46.1)
HGB BLD-MCNC: 14.7 G/DL (ref 11.5–15.4)
MAGNESIUM SERPL-MCNC: 2.2 MG/DL (ref 1.6–2.6)
MCH RBC QN AUTO: 30.8 PG (ref 26.8–34.3)
MCHC RBC AUTO-ENTMCNC: 33.9 G/DL (ref 31.4–37.4)
MCV RBC AUTO: 91 FL (ref 82–98)
P AXIS: 34 DEGREES
PLATELET # BLD AUTO: 292 THOUSANDS/UL (ref 149–390)
PMV BLD AUTO: 9.9 FL (ref 8.9–12.7)
POTASSIUM SERPL-SCNC: 4.6 MMOL/L (ref 3.5–5.3)
PR INTERVAL: 156 MS
PROT SERPL-MCNC: 7.3 G/DL (ref 6.4–8.2)
QRS AXIS: 45 DEGREES
QRSD INTERVAL: 90 MS
QT INTERVAL: 400 MS
QTC INTERVAL: 438 MS
RBC # BLD AUTO: 4.78 MILLION/UL (ref 3.81–5.12)
SODIUM SERPL-SCNC: 141 MMOL/L (ref 136–145)
T WAVE AXIS: 55 DEGREES
VENTRICULAR RATE: 72 BPM
WBC # BLD AUTO: 8.4 THOUSAND/UL (ref 4.31–10.16)

## 2020-01-30 PROCEDURE — 83735 ASSAY OF MAGNESIUM: CPT | Performed by: INTERNAL MEDICINE

## 2020-01-30 PROCEDURE — 80053 COMPREHEN METABOLIC PANEL: CPT | Performed by: INTERNAL MEDICINE

## 2020-01-30 PROCEDURE — 99217 PR OBSERVATION CARE DISCHARGE MANAGEMENT: CPT | Performed by: INTERNAL MEDICINE

## 2020-01-30 PROCEDURE — 85027 COMPLETE CBC AUTOMATED: CPT | Performed by: INTERNAL MEDICINE

## 2020-01-30 RX ORDER — ROSUVASTATIN CALCIUM 5 MG/1
20 TABLET, COATED ORAL
Status: DISCONTINUED | OUTPATIENT
Start: 2020-01-30 | End: 2020-01-30 | Stop reason: HOSPADM

## 2020-01-30 RX ORDER — ASPIRIN 81 MG/1
81 TABLET, CHEWABLE ORAL DAILY
Qty: 30 TABLET | Refills: 0 | Status: SHIPPED | OUTPATIENT
Start: 2020-01-31

## 2020-01-30 RX ADMIN — Medication 1000 MG: at 08:36

## 2020-01-30 RX ADMIN — FLUOXETINE 40 MG: 20 CAPSULE ORAL at 08:36

## 2020-01-30 RX ADMIN — ASPIRIN 81 MG 81 MG: 81 TABLET ORAL at 08:36

## 2020-01-30 RX ADMIN — PANTOPRAZOLE SODIUM 40 MG: 40 TABLET, DELAYED RELEASE ORAL at 08:36

## 2020-01-30 RX ADMIN — CALCIUM 1 TABLET: 500 TABLET ORAL at 08:36

## 2020-01-30 RX ADMIN — Medication 1 TABLET: at 08:36

## 2020-01-30 RX ADMIN — HALOBETASOL PROPIONATE 1 APPLICATION: 0.5 CREAM TOPICAL at 08:36

## 2020-01-30 RX ADMIN — ENOXAPARIN SODIUM 40 MG: 40 INJECTION SUBCUTANEOUS at 08:36

## 2020-01-30 NOTE — SOCIAL WORK
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

## 2020-01-30 NOTE — DISCHARGE SUMMARY
Discharge- [facility] [place] 1955, 60 y o  female MRN: [id number]    Unit/Bed#: -01 Encounter: [id number]    Primary Care Provider: Mar Aguilera MD   Date and time admitted to hospital: 1/29/2020  1:53 PM        * Chest pain  Assessment & Plan  Serial cardiac enzymes  Monitor symptoms closely  Telemetry  Cardiology consultation  ? Outpatient stress  Troponins negative  Tobacco abuse  Assessment & Plan  1/4 pack for 25 years    History of cerebral aneurysm repair  Assessment & Plan  Patient with prior repair in 1996    Hyperlipidemia  Assessment & Plan   Zocor                    Admission Date:   Admission Orders (From admission, onward)     Ordered        01/29/20 1513  Place in Observation  Once                     Admitting Diagnosis: Arm pain [M79 603]  Chest pain [R07 9]        Procedures Performed:   Orders Placed This Encounter   Procedures    ED ECG Documentation Only       Summary of Hospital Course: This is a very pleasant 63-year-old female presents to the emergency room with symptoms of chest discomfort starting day before admission  The patient presents hospital with symptoms of jaw pain and left arm discomfort  The patient noted symptoms for few minutes and then subsequently subsided  She presents the hospital for further workup evaluation  She notes a prior history of this in the past   · Noncardiac chest pain-note cardiology input  Will follow up as outpatient for stress testing  Continue supportive care  Follow-up with PCP for further workup and management  · Tobacco abuse-smoking cessation  · Hyperlipidemia  · Prior history of cerebral aneurysm repair          Discharge instructions/Information to patient and family:   See after visit summary for information provided to patient and family  Provisions for Follow-Up Care:  See after visit summary for information related to follow-up care and any pertinent home health orders        PCP: Mar Aguilera MD    Disposition: Home    Planned Readmission: No    Discharge Statement   I spent 35 minutes discharging the patient  This time was spent on the day of discharge  I had direct contact with the patient on the day of discharge  Additional documentation is required if more than 30 minutes were spent on discharge  Discharge Medications:  See after visit summary for reconciled discharge medications provided to patient and family

## 2020-01-30 NOTE — UTILIZATION REVIEW
Initial Clinical Review    Admission: Date/Time/Statement: Observation 1/29/20 @1513  Orders Placed This Encounter   Procedures    Place in Observation     Standing Status:   Standing     Number of Occurrences:   1     Order Specific Question:   Admitting Physician     Answer:   Oral Doshi     Order Specific Question:   Level of Care     Answer:   Med Surg [16]     ED Arrival Information     Expected Arrival Acuity Means of Arrival Escorted By Service Admission Type    1/29/2020 1/29/2020 13:47 Emergent Walk-In Self General Medicine Emergency    Arrival Complaint    arm pain        Chief Complaint   Patient presents with    Chest Pain     "attacks" lasting a few minutes at a time  yesterday had 2 attacks, Attack is jaw pain, chest pain and heaviness and dull pain in her left arm  yesterday took an aspirin, went home and took a nap  lasted about an hour  Assessment/Plan: 60year old female to the ED from home with complaint sof chest pain  Admitted under observation for chest pain  THe pain started the morning of her arrival, started with jaw pain in addition to chest pain  Pain resolved after 2-3 minutes  Trend troponins, cardiology conuslt     ED Triage Vitals [01/29/20 1355]   Temperature Pulse Respirations Blood Pressure SpO2   98 5 °F (36 9 °C) 84 18 (!) 178/100 95 %      Temp Source Heart Rate Source Patient Position - Orthostatic VS BP Location FiO2 (%)   Temporal Monitor Lying Left arm --      Pain Score       5        Wt Readings from Last 1 Encounters:   01/29/20 95 7 kg (210 lb 15 7 oz)     Additional Vital Signs:  Date/Time  Temp  Pulse  Resp  BP  MAP (mmHg)  SpO2  O2 Device  Patient Position - Orthostatic VS   01/30/20 07:38:18  98 1 °F (36 7 °C)  73  16  113/70  84  95 %       01/29/20 23:20:07  97 4 °F (36 3 °C)Abnormal   63  17  127/72  90  97 %       01/29/20 2011              None (Room air)     01/29/20 19:04:40  97 6 °F (36 4 °C)  68  16  130/72  91  95 %     01/29/20 1641              None (Room air)     01/29/20 16:20:32  97 3 °F (36 3 °C)Abnormal   62  20  139/83  102  98 %    Lying   01/29/20 1530    63  18  139/71    96 %  None (Room air)     01/29/20 1500    73  20  132/60    92 %  None (Room air)     01/29/20 1403    72  18  145/76             Pertinent Labs/Diagnostic Test Results:   CXR 1/29:  No acute cardiopulmonary disease    EKG 1/29:  EKG:  Normal sinus rhythm with no evidence of ST elevation or depression  Results from last 7 days   Lab Units 01/30/20  0459 01/29/20  1710 01/29/20  1412   WBC Thousand/uL 8 40  --  8 71   HEMOGLOBIN g/dL 14 7  --  14 5   HEMATOCRIT % 43 3  --  41 8   PLATELETS Thousands/uL 292 278 295   NEUTROS ABS Thousands/µL  --   --  4 57         Results from last 7 days   Lab Units 01/30/20  0459 01/29/20  1412   SODIUM mmol/L 141 139   POTASSIUM mmol/L 4 6 3 7   CHLORIDE mmol/L 106 103   CO2 mmol/L 27 27   ANION GAP mmol/L 8 9   BUN mg/dL 15 20   CREATININE mg/dL 0 66 0 60   EGFR ml/min/1 73sq m 95 98   CALCIUM mg/dL 8 8 8 6   MAGNESIUM mg/dL 2 2  --      Results from last 7 days   Lab Units 01/30/20  0459 01/29/20  1412   AST U/L 24 24   ALT U/L 37 41   ALK PHOS U/L 67 74   TOTAL PROTEIN g/dL 7 3 7 1   ALBUMIN g/dL 3 8 3 9   TOTAL BILIRUBIN mg/dL 0 38 0 38         Results from last 7 days   Lab Units 01/30/20  0459 01/29/20  1412   GLUCOSE RANDOM mg/dL 100 101     Results from last 7 days   Lab Units 01/29/20  2256 01/29/20 2002 01/29/20  1710 01/29/20  1412   TROPONIN I ng/mL <0 02 <0 02 <0 02 <0 02     ED Treatment:   Medication Administration from 01/29/2020 1335 to 01/29/2020 1619       Date/Time Order Dose Route Action     01/29/2020 1454 nitroglycerin (NITROSTAT) SL tablet 0 4 mg 0 4 mg Sublingual Given        Past Medical History:   Diagnosis Date    Cerebral aneurysm     Hyperlipidemia      Admitting Diagnosis: Arm pain [M79 603]  Chest pain [R07 9]  Age/Sex: 60 y o  female  Admission Orders:  TEle    Scheduled Medications:    Medications:  aspirin 81 mg Oral Daily   atorvastatin 40 mg Oral Daily With Dinner   calcium carbonate 1 tablet Oral Daily With Breakfast   enoxaparin 40 mg Subcutaneous Daily   fish oil 1,000 mg Oral Daily   FLUoxetine 40 mg Oral Daily   halobetasol 1 application Topical BID   multivitamin-minerals 1 tablet Oral Daily   pantoprazole 40 mg Oral Early Morning   ramelteon 8 mg Oral HS   traZODone 50 mg Oral HS     Continuous IV Infusions:     PRN Meds:    acetaminophen 650 mg Oral Q6H PRN   nitroglycerin 0 4 mg Sublingual Q5 Min PRN   sodium chloride (PF) 3 mL Intravenous PRN       IP CONSULT TO CARDIOLOGY  IP CONSULT TO CARDIOLOGY    Network Utilization Review Department  [email or web address]  ATTENTION: Please call with any questions or concerns to [phone] and carefully listen to the prompts so that you are directed to the right person  All voicemails are confidential   [employer] all requests for admission clinical reviews, approved or denied determinations and any other requests to dedicated fax number below belonging to the campus where the patient is receiving treatment   List of dedicated fax numbers for the Facilities:  [address] DENIALS (Administrative/Medical Necessity) [phone]   [address] [place] [address] (Maternity/NICU/Pediatrics) [phone]   [facility] [place] [phone]   [facility] [phone]   [address] [place] [address] [phone]   [facility] [phone]   [address] [place] [address] [phone]   [facility]  [phone]   [address] [facility]  [address] [phone]

## 2020-01-30 NOTE — OCCUPATIONAL THERAPY NOTE
Occupational Therapy Screen      Patient Name: Liz Leavitt  [id number] Date: 1/30/2020      OT orders received  Chart review completed  Upon entering Pt's room, Pt was exiting the bathroom @ I with no AD  Therapist spoke with Pt regarding current status  Pt reports no change in functional status and is comfortable to return home at this time  Pt refusing any further OT evaluation at this time  D/c OT ordered effective 1/30/2020  If new concerns arise, please re-consult        ALLISON Valverde/TOPHER

## 2020-01-30 NOTE — CONSULTS
Consultation - Cardiology   Douglas Green 60 y o  female MRN: [id number]  Unit/Bed#: -01 Encounter: [id number]    Assessment/Plan     Assessment:  Chest pain- normal troponin, non ischemic EKG,  likely non cardiac in nature  WHITAKER- will need to r/o ischemic causes, euvolemic on exam   Hx of cerebral aneurysm repair in 1996  Hyperlipidemia- on zocor as an outpatient, put on lipitor in patient but pt wants to be on crestor  Cardiac cath 14 years ago pt reports normal at [facility] cardiology  Hx of smoking    Plan:  - stop lipitor, start crestor  - congratulated pt on quitting smoking a few years ago  - rec outpatient stress echo and fu with cardiology after, we will coordinate appt  - okay for discharge now from a cardiac perspective  - continue asa for now  - bp control recommended, appears to have normalized since admission  History of Present Illness   Physician Requesting Consult: Kaity Mello DO  Reason for Consult / Principal Problem: Chest Pain  HPI: Douglas Green is a 60y o  year old female who presented to the ED today after being seen in family medicine office  She had developed chest pain yesterday morning while seated at a desk  She notes her pain was radiating into her jaw and the left arm  The episode caused her to feel tired but she didn't take anything for it  They resolved within 5 minutes but then recurred again a few hours later and lasted for one hour  She states she took a xanax and an asa and doesn't know if this helped her symptoms  She was not doing any strenuous activity  Notes for the last few years she gets pain in her jaw and crushing chest pain  She states this has been happening for 12 years off an on  She had been to the ED for this in the past  she has had stress tests and caths which have been normal ([facility] cardiology)  She states this episode scared her because she had two episodes within three hours which is unusual for her    Her symptoms have never happened when she is doing something strenuous  Her last stress test was at least 5 years ago  She thinks her last episode was 2 months ago  These episodes are very random and she states it is not something that happens regularly  She reports in the last 6 months she has gained weight and not been active  She thinks this is causing her to feel more sob on exertion  She reports specifically worse when she walks up a flight of steps  One time her daughter noticed her getting really sob making her bed  She reports no significant stress at home  She has a history of smoking but quit a few years ago  No alcohol use  Inpatient consult to Cardiology  Consult performed by: [place], PA-C  Consult ordered by: Venkata Zuñiga DO          Review of Systems   Constitutional: Negative for chills, diaphoresis, fatigue, fever and unexpected weight change  Respiratory: Negative for apnea, cough and wheezing  Cardiovascular: Positive for chest pain  Negative for palpitations and leg swelling  Gastrointestinal: Negative for abdominal pain, nausea and vomiting  Musculoskeletal: Negative for joint swelling and myalgias  Neurological: Negative for dizziness, syncope and light-headedness  Psychiatric/Behavioral: Negative for confusion         Historical Information   Past Medical History:   Diagnosis Date    Cerebral aneurysm     Hyperlipidemia      Past Surgical History:   Procedure Laterality Date    CARDIAC CATHETERIZATION      2005    CEREBRAL ANEURYSM REPAIR  07/1997    at [facility]       Social History     Substance and Sexual Activity   Alcohol Use Never    Frequency: Never     Social History     Substance and Sexual Activity   Drug Use Never     Social History     Tobacco Use   Smoking Status Current Every Day Smoker    Packs/day: 0 25    Years: 25 00    Pack years: 6 25   Smokeless Tobacco Never Used     Family History: non-contributory  Notes a fhx of "something with one of her valves "    Meds/Allergies   all current active meds have been reviewed  No Known Allergies    Objective   Vitals: Blood pressure 113/70, pulse 73, temperature 98 1 °F (36 7 °C), resp  rate 16, height 5' 7" (1 702 m), weight 95 7 kg (210 lb 15 7 oz), SpO2 95 %  Orthostatic Blood Pressures      Most Recent Value   Blood Pressure  113/70 filed at 01/30/2020 4784   Patient Position - Orthostatic VS  Lying filed at 01/29/2020 1620            Intake/Output Summary (Last 24 hours) at 1/30/2020 0806  Last data filed at 1/30/2020 0709  Gross per 24 hour   Intake 840 ml   Output    Net 840 ml       Invasive Devices     Peripheral Intravenous Line            Peripheral IV 01/29/20 Left Arm less than 1 day                Physical Exam   Constitutional: She is oriented to person, place, and time  She appears well-developed and well-nourished  HENT:   Head: Normocephalic and atraumatic  Eyes: Pupils are equal, round, and reactive to light  Neck: Normal range of motion  Neck supple  No JVD present  Cardiovascular: Normal rate, regular rhythm, normal heart sounds and intact distal pulses  Pulmonary/Chest: Effort normal and breath sounds normal    Abdominal: Soft  Musculoskeletal: Normal range of motion  She exhibits no edema  Neurological: She is alert and oriented to person, place, and time  Skin: Skin is warm and dry  Capillary refill takes less than 2 seconds  Psychiatric: She has a normal mood and affect  Lab Results:   I have personally reviewed pertinent lab results      CBC with diff:   Results from last 7 days   Lab Units 01/30/20  0459   WBC Thousand/uL 8 40   RBC Million/uL 4 78   HEMOGLOBIN g/dL 14 7   HEMATOCRIT % 43 3   MCV fL 91   MCH pg 30 8   MCHC g/dL 33 9   RDW % 12 8   MPV fL 9 9   PLATELETS Thousands/uL 292     CMP:   Results from last 7 days   Lab Units 01/30/20  0459   SODIUM mmol/L 141   POTASSIUM mmol/L 4 6   CHLORIDE mmol/L 106   CO2 mmol/L 27   BUN mg/dL 15   CREATININE mg/dL 0 66 CALCIUM mg/dL 8 8   AST U/L 24   ALT U/L 37   ALK PHOS U/L 67   EGFR ml/min/1 73sq m 95     Troponin:   0   Lab Value Date/Time    TROPONINI <0 02 01/29/2020 2256    TROPONINI <0 02 01/29/2020 2002    TROPONINI <0 02 01/29/2020 1710    TROPONINI <0 02 01/29/2020 1412     BNP:   Results from last 7 days   Lab Units 01/30/20  0459   POTASSIUM mmol/L 4 6   CHLORIDE mmol/L 106   CO2 mmol/L 27   BUN mg/dL 15   CREATININE mg/dL 0 66   CALCIUM mg/dL 8 8   EGFR ml/min/1 73sq m 95     Magnesium:   Results from last 7 days   Lab Units 01/30/20  0459   MAGNESIUM mg/dL 2 2     Imaging: I have personally reviewed pertinent reports       CXR 1/29/20:  No acute cardiopulmonary disease    EKG: normal sinus rhythm no st or t wave changes

## 2020-01-30 NOTE — ASSESSMENT & PLAN NOTE
Cardiac enzymes are negative    Note cardiology input  Outpatient stress test  Follow-up with Cardiology

## 2020-02-13 ENCOUNTER — HOSPITAL ENCOUNTER (OUTPATIENT)
Dept: NON INVASIVE DIAGNOSTICS | Facility: HOSPITAL | Age: 65
Discharge: HOME | End: 2020-02-13
Payer: COMMERCIAL

## 2020-02-13 DIAGNOSIS — R07.9 CHEST PAIN, UNSPECIFIED: ICD-10-CM

## 2020-02-13 DIAGNOSIS — R06.09 OTHER FORMS OF DYSPNEA: ICD-10-CM

## 2020-02-13 DIAGNOSIS — E78.5 HYPERLIPIDEMIA, UNSPECIFIED: ICD-10-CM

## 2020-02-13 PROCEDURE — 93351 STRESS TTE COMPLETE: CPT | Performed by: INTERNAL MEDICINE

## 2020-02-13 PROCEDURE — 93350 STRESS TTE ONLY: CPT

## 2020-02-14 LAB
ARRHY DURING EX: NORMAL
CHEST PAIN STATEMENT: NORMAL
MAX DIASTOLIC BP: 80 MMHG
MAX HEART RATE: 150 BPM
MAX PREDICTED HEART RATE: 158 BPM
MAX. SYSTOLIC BP: 140 MMHG
PROTOCOL NAME: NORMAL
REASON FOR TERMINATION: NORMAL
TARGET HR FORMULA: NORMAL
TEST INDICATION: NORMAL
TIME IN EXERCISE PHASE: NORMAL

## 2020-08-28 ENCOUNTER — DOCTOR'S OFFICE (OUTPATIENT)
Dept: URBAN - NONMETROPOLITAN AREA CLINIC 1 | Facility: CLINIC | Age: 65
Setting detail: OPHTHALMOLOGY
End: 2020-08-28
Payer: COMMERCIAL

## 2020-08-28 DIAGNOSIS — H40.033: ICD-10-CM

## 2020-08-28 DIAGNOSIS — H43.813: ICD-10-CM

## 2020-08-28 DIAGNOSIS — H40.003: ICD-10-CM

## 2020-08-28 DIAGNOSIS — H52.4: ICD-10-CM

## 2020-08-28 DIAGNOSIS — H25.13: ICD-10-CM

## 2020-08-28 PROCEDURE — 76514 ECHO EXAM OF EYE THICKNESS: CPT | Performed by: OPTOMETRIST

## 2020-08-28 PROCEDURE — 92083 EXTENDED VISUAL FIELD XM: CPT | Performed by: OPTOMETRIST

## 2020-08-28 PROCEDURE — 92014 COMPRE OPH EXAM EST PT 1/>: CPT | Performed by: OPTOMETRIST

## 2020-08-28 PROCEDURE — 92015 DETERMINE REFRACTIVE STATE: CPT | Performed by: OPTOMETRIST

## 2020-08-28 ASSESSMENT — REFRACTION_CURRENTRX
OS_CYLINDER: -1.50
OD_OVR_VA: 20/
OD_SPHERE: +0.75
OS_AXIS: 059
OS_VPRISM_DIRECTION: PROGS
OS_OVR_VA: 20/
OD_ADD: +2.50
OD_AXIS: 118
OS_ADD: +2.50
OD_CYLINDER: -2.75
OD_VPRISM_DIRECTION: PROGS
OS_SPHERE: +0.50

## 2020-08-28 ASSESSMENT — REFRACTION_AUTOREFRACTION
OS_AXIS: 063
OD_CYLINDER: -3.75
OD_SPHERE: +1.75
OS_CYLINDER: -1.00
OD_AXIS: 122
OS_SPHERE: +0.75

## 2020-08-28 ASSESSMENT — REFRACTION_MANIFEST
OS_CYLINDER: -1.25
OD_VA1: 20/25-2
OS_SPHERE: +1.00
OS_VA2: 20/40+2
OS_AXIS: 075
OD_ADD: +2.50
OD_VA2: 20/25-2
OD_SPHERE: +1.75
OS_VA1: 20/40+2
OD_AXIS: 120
OS_ADD: +2.50
OD_CYLINDER: -3.50

## 2020-08-28 ASSESSMENT — SPHEQUIV_DERIVED
OS_SPHEQUIV: 0.375
OD_SPHEQUIV: 0
OS_SPHEQUIV: 0.25
OD_SPHEQUIV: -0.125

## 2020-08-28 ASSESSMENT — PACHYMETRY
OS_CT_UM: 538
OD_CT_UM: 531
OS_CT_CORRECTION: 1
OD_CT_CORRECTION: 1

## 2020-08-28 ASSESSMENT — AXIALLENGTH_DERIVED
OD_AL: 22.708
OS_AL: 22.4506
OD_AL: 22.7532
OS_AL: 22.4066

## 2020-08-28 ASSESSMENT — VISUAL ACUITY
OS_BCVA: 20/30-2
OD_BCVA: 20/60-2

## 2020-08-28 ASSESSMENT — CONFRONTATIONAL VISUAL FIELD TEST (CVF)
OD_FINDINGS: FULL
OS_FINDINGS: FULL

## 2020-08-28 ASSESSMENT — KERATOMETRY
OS_K1POWER_DIOPTERS: 46.00
OD_K2POWER_DIOPTERS: 47.50
OD_AXISANGLE_DEGREES: 125
OS_AXISANGLE_DEGREES: 067
OD_K1POWER_DIOPTERS: 44.50
OS_K2POWER_DIOPTERS: 47.00

## 2020-08-28 ASSESSMENT — SUPERFICIAL PUNCTATE KERATITIS (SPK)
OS_SPK: 1+ 2+
OD_SPK: 1+ 2+

## 2020-10-14 ENCOUNTER — DOCTOR'S OFFICE (OUTPATIENT)
Dept: URBAN - NONMETROPOLITAN AREA CLINIC 1 | Facility: CLINIC | Age: 65
Setting detail: OPHTHALMOLOGY
End: 2020-10-14
Payer: COMMERCIAL

## 2020-10-14 DIAGNOSIS — H43.813: ICD-10-CM

## 2020-10-14 DIAGNOSIS — H40.013: ICD-10-CM

## 2020-10-14 DIAGNOSIS — H25.13: ICD-10-CM

## 2020-10-14 DIAGNOSIS — H16.223: ICD-10-CM

## 2020-10-14 DIAGNOSIS — H40.033: ICD-10-CM

## 2020-10-14 PROCEDURE — 92133 CPTRZD OPH DX IMG PST SGM ON: CPT | Performed by: OPHTHALMOLOGY

## 2020-10-14 PROCEDURE — 92012 INTRM OPH EXAM EST PATIENT: CPT | Performed by: OPHTHALMOLOGY

## 2020-10-14 ASSESSMENT — AXIALLENGTH_DERIVED
OS_AL: 22.4506
OD_AL: 22.708
OS_AL: 22.4066
OD_AL: 22.663

## 2020-10-14 ASSESSMENT — REFRACTION_CURRENTRX
OD_OVR_VA: 20/
OS_OVR_VA: 20/
OS_CYLINDER: -1.50
OD_ADD: +2.50
OS_SPHERE: +0.50
OD_VPRISM_DIRECTION: PROGS
OD_SPHERE: +0.75
OS_ADD: +2.50
OD_CYLINDER: -2.75
OS_VPRISM_DIRECTION: PROGS
OS_AXIS: 059
OD_AXIS: 118

## 2020-10-14 ASSESSMENT — REFRACTION_MANIFEST
OS_AXIS: 075
OD_ADD: +2.50
OS_VA1: 20/40+2
OD_CYLINDER: -3.50
OS_SPHERE: +1.00
OD_SPHERE: +1.75
OS_ADD: +2.50
OD_VA1: 20/25-2
OS_CYLINDER: -1.25
OD_VA2: 20/25-2
OS_VA2: 20/40+2
OD_AXIS: 120

## 2020-10-14 ASSESSMENT — KERATOMETRY
OD_AXISANGLE_DEGREES: 125
OD_K2POWER_DIOPTERS: 47.50
OS_K1POWER_DIOPTERS: 46.00
OS_K2POWER_DIOPTERS: 47.00
OS_AXISANGLE_DEGREES: 067
OD_K1POWER_DIOPTERS: 44.50

## 2020-10-14 ASSESSMENT — REFRACTION_AUTOREFRACTION
OD_CYLINDER: -2.75
OS_SPHERE: +1.25
OS_AXIS: 67
OD_AXIS: 122
OD_SPHERE: +1.50
OS_CYLINDER: -2.00

## 2020-10-14 ASSESSMENT — SUPERFICIAL PUNCTATE KERATITIS (SPK)
OD_SPK: 1+ 2+
OS_SPK: 1+ 2+

## 2020-10-14 ASSESSMENT — SPHEQUIV_DERIVED
OD_SPHEQUIV: 0
OS_SPHEQUIV: 0.25
OS_SPHEQUIV: 0.375
OD_SPHEQUIV: 0.125

## 2020-10-14 ASSESSMENT — VISUAL ACUITY
OS_BCVA: 20/40+1
OD_BCVA: 20/60+1

## 2020-10-28 ENCOUNTER — RX ONLY (RX ONLY)
Age: 65
End: 2020-10-28

## 2020-10-28 ENCOUNTER — DOCTOR'S OFFICE (OUTPATIENT)
Dept: URBAN - NONMETROPOLITAN AREA CLINIC 1 | Facility: CLINIC | Age: 65
Setting detail: OPHTHALMOLOGY
End: 2020-10-28
Payer: COMMERCIAL

## 2020-10-28 VITALS — HEIGHT: 55 IN

## 2020-10-28 DIAGNOSIS — H16.221: ICD-10-CM

## 2020-10-28 DIAGNOSIS — H16.222: ICD-10-CM

## 2020-10-28 DIAGNOSIS — S05.01XA: ICD-10-CM

## 2020-10-28 PROCEDURE — 92012 INTRM OPH EXAM EST PATIENT: CPT | Performed by: OPTOMETRIST

## 2020-10-28 ASSESSMENT — LID EXAM ASSESSMENTS
OD_EDEMA: RUL 1+
OD_ECCHYMOSIS: RUL 1+

## 2020-10-28 ASSESSMENT — VISUAL ACUITY
OD_BCVA: 20/60+1
OS_BCVA: 20/60+1

## 2020-10-28 ASSESSMENT — CORNEAL TRAUMA: OD_TRAUMA: CLOCK

## 2020-10-28 ASSESSMENT — SPHEQUIV_DERIVED
OS_SPHEQUIV: 0.25
OD_SPHEQUIV: 0
OS_SPHEQUIV: 0.375
OD_SPHEQUIV: 0.125

## 2020-10-28 ASSESSMENT — CONFRONTATIONAL VISUAL FIELD TEST (CVF)
OD_FINDINGS: FULL
OS_FINDINGS: FULL

## 2020-10-28 ASSESSMENT — PACHYMETRY
OS_CT_UM: 538
OS_CT_CORRECTION: 1
OD_CT_UM: 531
OD_CT_CORRECTION: 1

## 2020-10-28 ASSESSMENT — REFRACTION_CURRENTRX
OS_VPRISM_DIRECTION: PROGS
OD_VPRISM_DIRECTION: PROGS
OS_ADD: +2.50
OD_AXIS: 118
OS_CYLINDER: -1.50
OD_OVR_VA: 20/
OS_AXIS: 059
OD_CYLINDER: -2.75
OD_ADD: +2.50
OS_OVR_VA: 20/
OS_SPHERE: +0.50
OD_SPHERE: +0.75

## 2020-10-28 ASSESSMENT — REFRACTION_MANIFEST
OS_ADD: +2.50
OD_AXIS: 120
OS_CYLINDER: -1.25
OD_ADD: +2.50
OD_VA2: 20/25-2
OS_AXIS: 075
OS_VA1: 20/40+2
OD_SPHERE: +1.75
OS_SPHERE: +1.00
OD_VA1: 20/25-2
OS_VA2: 20/40+2
OD_CYLINDER: -3.50

## 2020-10-28 ASSESSMENT — KERATOMETRY
OS_K1POWER_DIOPTERS: 46.00
OD_K2POWER_DIOPTERS: 47.50
OD_K1POWER_DIOPTERS: 44.50
OS_AXISANGLE_DEGREES: 067
OS_K2POWER_DIOPTERS: 47.00
OD_AXISANGLE_DEGREES: 125

## 2020-10-28 ASSESSMENT — AXIALLENGTH_DERIVED
OD_AL: 22.663
OD_AL: 22.708
OS_AL: 22.4506
OS_AL: 22.4066

## 2020-10-28 ASSESSMENT — CORNEAL TRAUMA - ABRASION: OD_ABRASION: PRESENT

## 2020-10-28 ASSESSMENT — TONOMETRY: OD_IOP_MMHG: 18

## 2020-10-28 ASSESSMENT — SUPERFICIAL PUNCTATE KERATITIS (SPK)
OS_SPK: 1+ 2+
OD_SPK: 1+ 2+

## 2020-10-28 ASSESSMENT — REFRACTION_AUTOREFRACTION
OD_CYLINDER: -2.75
OD_AXIS: 122
OS_AXIS: 67
OD_SPHERE: +1.50
OS_SPHERE: +1.25
OS_CYLINDER: -2.00

## 2020-11-06 ENCOUNTER — DOCTOR'S OFFICE (OUTPATIENT)
Dept: URBAN - NONMETROPOLITAN AREA CLINIC 1 | Facility: CLINIC | Age: 65
Setting detail: OPHTHALMOLOGY
End: 2020-11-06
Payer: COMMERCIAL

## 2020-11-06 ENCOUNTER — RX ONLY (RX ONLY)
Age: 65
End: 2020-11-06

## 2020-11-06 DIAGNOSIS — H16.222: ICD-10-CM

## 2020-11-06 DIAGNOSIS — H16.221: ICD-10-CM

## 2020-11-06 DIAGNOSIS — S05.01XD: ICD-10-CM

## 2020-11-06 PROCEDURE — 92012 INTRM OPH EXAM EST PATIENT: CPT | Performed by: OPTOMETRIST

## 2020-11-06 ASSESSMENT — REFRACTION_AUTOREFRACTION
OS_AXIS: 67
OD_CYLINDER: -2.75
OS_SPHERE: +1.25
OD_AXIS: 122
OD_SPHERE: +1.50
OS_CYLINDER: -2.00

## 2020-11-06 ASSESSMENT — KERATOMETRY
OS_K1POWER_DIOPTERS: 46.00
OD_AXISANGLE_DEGREES: 125
OS_K2POWER_DIOPTERS: 47.00
OD_K1POWER_DIOPTERS: 44.50
OS_AXISANGLE_DEGREES: 067
OD_K2POWER_DIOPTERS: 47.50

## 2020-11-06 ASSESSMENT — REFRACTION_MANIFEST
OD_SPHERE: +1.75
OS_VA2: 20/40+2
OD_VA2: 20/25-2
OS_SPHERE: +1.00
OD_ADD: +2.50
OS_ADD: +2.50
OD_AXIS: 120
OS_CYLINDER: -1.25
OD_CYLINDER: -3.50
OS_VA1: 20/40+2
OD_VA1: 20/25-2
OS_AXIS: 075

## 2020-11-06 ASSESSMENT — SUPERFICIAL PUNCTATE KERATITIS (SPK)
OD_SPK: 1+ 2+
OS_SPK: 1+ 2+

## 2020-11-06 ASSESSMENT — CONFRONTATIONAL VISUAL FIELD TEST (CVF)
OD_FINDINGS: FULL
OS_FINDINGS: FULL

## 2020-11-06 ASSESSMENT — AXIALLENGTH_DERIVED
OD_AL: 22.663
OS_AL: 22.4066
OS_AL: 22.4506
OD_AL: 22.708

## 2020-11-06 ASSESSMENT — PACHYMETRY
OD_CT_UM: 531
OS_CT_UM: 538
OD_CT_CORRECTION: 1
OS_CT_CORRECTION: 1

## 2020-11-06 ASSESSMENT — TONOMETRY: OD_IOP_MMHG: 18

## 2020-11-06 ASSESSMENT — REFRACTION_CURRENTRX
OS_AXIS: 059
OS_OVR_VA: 20/
OD_SPHERE: +0.75
OD_AXIS: 118
OD_OVR_VA: 20/
OS_SPHERE: +0.50
OD_CYLINDER: -2.75
OD_ADD: +2.50
OS_ADD: +2.50
OS_CYLINDER: -1.50
OD_VPRISM_DIRECTION: PROGS
OS_VPRISM_DIRECTION: PROGS

## 2020-11-06 ASSESSMENT — VISUAL ACUITY
OD_BCVA: 20/80-1
OS_BCVA: 20/50-2

## 2020-11-06 ASSESSMENT — SPHEQUIV_DERIVED
OS_SPHEQUIV: 0.25
OD_SPHEQUIV: 0
OD_SPHEQUIV: 0.125
OS_SPHEQUIV: 0.375

## 2020-11-10 ENCOUNTER — DOCTOR'S OFFICE (OUTPATIENT)
Dept: URBAN - NONMETROPOLITAN AREA CLINIC 1 | Facility: CLINIC | Age: 65
Setting detail: OPHTHALMOLOGY
End: 2020-11-10
Payer: COMMERCIAL

## 2020-11-10 DIAGNOSIS — H25.12: ICD-10-CM

## 2020-11-10 PROCEDURE — 92136 OPHTHALMIC BIOMETRY: CPT | Performed by: OPHTHALMOLOGY

## 2020-11-16 ENCOUNTER — AMBUL SURGICAL CARE (OUTPATIENT)
Dept: URBAN - NONMETROPOLITAN AREA SURGERY 1 | Facility: SURGERY | Age: 65
Setting detail: OPHTHALMOLOGY
End: 2020-11-16
Payer: COMMERCIAL

## 2020-11-16 DIAGNOSIS — H25.13: ICD-10-CM

## 2020-11-16 PROCEDURE — V2787 ASTIGMATISM-CORRECT FUNCTION: HCPCS | Performed by: OPHTHALMOLOGY

## 2020-11-16 PROCEDURE — S9986 NOT MEDICALLY NECESSARY SVC: HCPCS | Performed by: OPHTHALMOLOGY

## 2020-11-19 ENCOUNTER — AMBUL SURGICAL CARE (OUTPATIENT)
Dept: URBAN - NONMETROPOLITAN AREA SURGERY 1 | Facility: SURGERY | Age: 65
Setting detail: OPHTHALMOLOGY
End: 2020-11-19
Payer: COMMERCIAL

## 2020-11-19 DIAGNOSIS — H25.12: ICD-10-CM

## 2020-11-19 DIAGNOSIS — H25.042: ICD-10-CM

## 2020-11-19 DIAGNOSIS — H25.012: ICD-10-CM

## 2020-11-19 PROCEDURE — 66984 XCAPSL CTRC RMVL W/O ECP: CPT | Performed by: OPHTHALMOLOGY

## 2020-11-19 PROCEDURE — V2787 ASTIGMATISM-CORRECT FUNCTION: HCPCS | Performed by: OPHTHALMOLOGY

## 2020-11-19 PROCEDURE — G8907 PT DOC NO EVENTS ON DISCHARG: HCPCS | Performed by: OPHTHALMOLOGY

## 2020-11-19 PROCEDURE — G8918 PT W/O PREOP ORDER IV AB PRO: HCPCS | Performed by: OPHTHALMOLOGY

## 2020-11-20 ENCOUNTER — OPTICAL OFFICE (OUTPATIENT)
Dept: URBAN - NONMETROPOLITAN AREA CLINIC 4 | Facility: CLINIC | Age: 65
Setting detail: OPHTHALMOLOGY
End: 2020-11-20

## 2020-11-20 ENCOUNTER — RX ONLY (RX ONLY)
Age: 65
End: 2020-11-20

## 2020-11-20 ENCOUNTER — DOCTOR'S OFFICE (OUTPATIENT)
Dept: URBAN - NONMETROPOLITAN AREA CLINIC 1 | Facility: CLINIC | Age: 65
Setting detail: OPHTHALMOLOGY
End: 2020-11-20

## 2020-11-20 ENCOUNTER — DOCTOR'S OFFICE (OUTPATIENT)
Dept: URBAN - NONMETROPOLITAN AREA CLINIC 1 | Facility: CLINIC | Age: 65
Setting detail: OPHTHALMOLOGY
End: 2020-11-20
Payer: COMMERCIAL

## 2020-11-20 VITALS — HEIGHT: 55 IN

## 2020-11-20 DIAGNOSIS — H52.7: ICD-10-CM

## 2020-11-20 DIAGNOSIS — Z96.1: ICD-10-CM

## 2020-11-20 DIAGNOSIS — H25.11: ICD-10-CM

## 2020-11-20 PROCEDURE — V2799T TAXABLE VISION SERVICE MISCELLANEOUS: Performed by: OPHTHALMOLOGY

## 2020-11-20 PROCEDURE — 92136 OPHTHALMIC BIOMETRY: CPT | Performed by: OPHTHALMOLOGY

## 2020-11-20 PROCEDURE — 99024 POSTOP FOLLOW-UP VISIT: CPT | Performed by: OPTOMETRIST

## 2020-11-20 PROCEDURE — V2100 LENS SPHER SINGLE PLANO 4.00: HCPCS | Performed by: OPHTHALMOLOGY

## 2020-11-20 ASSESSMENT — REFRACTION_MANIFEST
OS_VA2: 20/40+2
OD_VA1: 20/25-2
OS_ADD: +2.50
OS_CYLINDER: -1.25
OD_VA2: 20/25-2
OD_CYLINDER: -3.50
OD_SPHERE: +1.75
OD_ADD: +2.50
OS_VA1: 20/40+2
OS_SPHERE: +1.00
OS_AXIS: 075
OD_AXIS: 120

## 2020-11-20 ASSESSMENT — SUPERFICIAL PUNCTATE KERATITIS (SPK)
OD_SPK: 1+ 2+
OS_SPK: 1+ 2+

## 2020-11-20 ASSESSMENT — SPHEQUIV_DERIVED
OD_SPHEQUIV: 0
OS_SPHEQUIV: -0.5
OS_SPHEQUIV: 0.375
OD_SPHEQUIV: 0.125

## 2020-11-20 ASSESSMENT — REFRACTION_CURRENTRX
OD_AXIS: 118
OS_OVR_VA: 20/
OS_AXIS: 059
OS_SPHERE: +0.50
OD_VPRISM_DIRECTION: PROGS
OD_CYLINDER: -2.75
OD_ADD: +2.50
OD_SPHERE: +0.75
OD_OVR_VA: 20/
OS_VPRISM_DIRECTION: PROGS
OS_ADD: +2.50
OS_CYLINDER: -1.50

## 2020-11-20 ASSESSMENT — KERATOMETRY
OD_AXISANGLE_DEGREES: 125
OS_AXISANGLE_DEGREES: 067
OD_K1POWER_DIOPTERS: 44.50
OS_K2POWER_DIOPTERS: 47.00
OS_K1POWER_DIOPTERS: 46.00
OD_K2POWER_DIOPTERS: 47.50

## 2020-11-20 ASSESSMENT — REFRACTION_AUTOREFRACTION
OD_CYLINDER: -2.75
OS_SPHERE: -0.50
OS_CYLINDER: 0.00
OD_SPHERE: +1.50
OS_AXIS: 000
OD_AXIS: 122

## 2020-11-20 ASSESSMENT — VISUAL ACUITY
OD_BCVA: 20/50-2
OS_BCVA: 20/50-2

## 2020-11-20 ASSESSMENT — AXIALLENGTH_DERIVED
OD_AL: 22.663
OD_AL: 22.708
OS_AL: 22.7184
OS_AL: 22.4066

## 2020-11-20 ASSESSMENT — PACHYMETRY
OD_CT_UM: 531
OD_CT_CORRECTION: 1
OS_CT_CORRECTION: 1
OS_CT_UM: 538

## 2020-11-23 ENCOUNTER — DOCTOR'S OFFICE (OUTPATIENT)
Dept: URBAN - NONMETROPOLITAN AREA CLINIC 1 | Facility: CLINIC | Age: 65
Setting detail: OPHTHALMOLOGY
End: 2020-11-23
Payer: COMMERCIAL

## 2020-11-23 DIAGNOSIS — S05.02XA: ICD-10-CM

## 2020-11-23 DIAGNOSIS — Z96.1: ICD-10-CM

## 2020-11-23 PROCEDURE — 92012 INTRM OPH EXAM EST PATIENT: CPT | Performed by: OPTOMETRIST

## 2020-11-23 PROCEDURE — 92071 CONTACT LENS FITTING FOR TX: CPT | Performed by: OPTOMETRIST

## 2020-11-23 ASSESSMENT — REFRACTION_MANIFEST
OD_VA2: 20/25-2
OD_VA1: 20/25-2
OD_ADD: +2.50
OD_SPHERE: +1.75
OS_VA2: 20/40+2
OS_AXIS: 075
OS_CYLINDER: -1.25
OS_VA1: 20/40+2
OD_CYLINDER: -3.50
OD_AXIS: 120
OS_ADD: +2.50
OS_SPHERE: +1.00

## 2020-11-23 ASSESSMENT — REFRACTION_AUTOREFRACTION
OS_AXIS: 177
OS_CYLINDER: -0.50
OD_SPHERE: +1.50
OD_AXIS: 122
OS_SPHERE: -0.50
OD_CYLINDER: -2.75

## 2020-11-23 ASSESSMENT — VISUAL ACUITY
OS_BCVA: 20/50-2
OD_BCVA: 20/30-2

## 2020-11-23 ASSESSMENT — SPHEQUIV_DERIVED
OS_SPHEQUIV: -0.75
OS_SPHEQUIV: 0.375
OD_SPHEQUIV: 0
OD_SPHEQUIV: 0.125

## 2020-11-23 ASSESSMENT — KERATOMETRY
OD_K1POWER_DIOPTERS: 44.50
OS_K2POWER_DIOPTERS: 47.00
OS_AXISANGLE_DEGREES: 067
OD_K2POWER_DIOPTERS: 47.50
OS_K1POWER_DIOPTERS: 46.00
OD_AXISANGLE_DEGREES: 125

## 2020-11-23 ASSESSMENT — REFRACTION_CURRENTRX
OS_CYLINDER: -1.50
OD_AXIS: 118
OD_ADD: +2.50
OD_OVR_VA: 20/
OD_SPHERE: +0.75
OD_VPRISM_DIRECTION: PROGS
OS_ADD: +2.50
OS_OVR_VA: 20/
OS_AXIS: 059
OS_SPHERE: +0.50
OD_CYLINDER: -2.75
OS_VPRISM_DIRECTION: PROGS

## 2020-11-23 ASSESSMENT — TONOMETRY: OS_IOP_MMHG: 19

## 2020-11-23 ASSESSMENT — AXIALLENGTH_DERIVED
OS_AL: 22.4066
OD_AL: 22.708
OS_AL: 22.8091
OD_AL: 22.663

## 2020-11-23 ASSESSMENT — PACHYMETRY
OD_CT_UM: 531
OS_CT_UM: 538
OD_CT_CORRECTION: 1
OS_CT_CORRECTION: 1

## 2020-11-23 ASSESSMENT — SUPERFICIAL PUNCTATE KERATITIS (SPK)
OD_SPK: 1+ 2+
OS_SPK: 1+ 2+

## 2020-11-24 ENCOUNTER — DOCTOR'S OFFICE (OUTPATIENT)
Dept: URBAN - NONMETROPOLITAN AREA CLINIC 1 | Facility: CLINIC | Age: 65
Setting detail: OPHTHALMOLOGY
End: 2020-11-24
Payer: COMMERCIAL

## 2020-11-24 ENCOUNTER — RX ONLY (RX ONLY)
Age: 65
End: 2020-11-24

## 2020-11-24 VITALS — HEIGHT: 55 IN

## 2020-11-24 DIAGNOSIS — S05.02XA: ICD-10-CM

## 2020-11-24 DIAGNOSIS — Z96.1: ICD-10-CM

## 2020-11-24 PROCEDURE — 99024 POSTOP FOLLOW-UP VISIT: CPT | Performed by: OPHTHALMOLOGY

## 2020-11-24 ASSESSMENT — CONFRONTATIONAL VISUAL FIELD TEST (CVF)
OD_FINDINGS: FULL
OS_FINDINGS: FULL

## 2020-11-24 ASSESSMENT — SUPERFICIAL PUNCTATE KERATITIS (SPK)
OS_SPK: 1+ 2+
OD_SPK: 1+ 2+

## 2020-11-25 ASSESSMENT — KERATOMETRY
OD_AXISANGLE_DEGREES: 125
OS_AXISANGLE_DEGREES: 067
OD_K1POWER_DIOPTERS: 44.50
OS_K1POWER_DIOPTERS: 46.00
OD_K2POWER_DIOPTERS: 47.50
OS_K2POWER_DIOPTERS: 47.00

## 2020-11-25 ASSESSMENT — REFRACTION_MANIFEST
OD_AXIS: 120
OS_VA1: 20/40+2
OS_ADD: +2.50
OD_VA1: 20/25-2
OS_SPHERE: +1.00
OD_ADD: +2.50
OD_VA2: 20/25-2
OD_CYLINDER: -3.50
OD_SPHERE: +1.75
OS_VA2: 20/40+2
OS_AXIS: 075
OS_CYLINDER: -1.25

## 2020-11-25 ASSESSMENT — REFRACTION_CURRENTRX
OD_AXIS: 118
OS_ADD: +2.50
OS_AXIS: 059
OD_SPHERE: +0.75
OD_CYLINDER: -2.75
OS_SPHERE: +0.50
OS_CYLINDER: -1.50
OD_VPRISM_DIRECTION: PROGS
OS_OVR_VA: 20/
OS_VPRISM_DIRECTION: PROGS
OD_ADD: +2.50
OD_OVR_VA: 20/

## 2020-11-25 ASSESSMENT — REFRACTION_AUTOREFRACTION
OD_AXIS: 122
OD_CYLINDER: -2.75
OS_CYLINDER: -0.50
OD_SPHERE: +1.50
OS_AXIS: 35
OS_SPHERE: +0.25

## 2020-11-25 ASSESSMENT — VISUAL ACUITY
OS_BCVA: 20/50-2
OD_BCVA: 20/25+2

## 2020-11-25 ASSESSMENT — AXIALLENGTH_DERIVED
OD_AL: 22.663
OS_AL: 22.4066
OS_AL: 22.5392
OD_AL: 22.708

## 2020-11-25 ASSESSMENT — SPHEQUIV_DERIVED
OS_SPHEQUIV: 0.375
OD_SPHEQUIV: 0.125
OD_SPHEQUIV: 0
OS_SPHEQUIV: 0

## 2020-12-03 ENCOUNTER — AMBUL SURGICAL CARE (OUTPATIENT)
Dept: URBAN - NONMETROPOLITAN AREA SURGERY 1 | Facility: SURGERY | Age: 65
Setting detail: OPHTHALMOLOGY
End: 2020-12-03
Payer: COMMERCIAL

## 2020-12-03 DIAGNOSIS — H25.041: ICD-10-CM

## 2020-12-03 DIAGNOSIS — H25.011: ICD-10-CM

## 2020-12-03 DIAGNOSIS — H25.11: ICD-10-CM

## 2020-12-03 PROCEDURE — G8918 PT W/O PREOP ORDER IV AB PRO: HCPCS | Performed by: OPHTHALMOLOGY

## 2020-12-03 PROCEDURE — G8907 PT DOC NO EVENTS ON DISCHARG: HCPCS | Performed by: OPHTHALMOLOGY

## 2020-12-03 PROCEDURE — 66984 XCAPSL CTRC RMVL W/O ECP: CPT | Performed by: OPHTHALMOLOGY

## 2020-12-03 PROCEDURE — V2787 ASTIGMATISM-CORRECT FUNCTION: HCPCS | Performed by: OPHTHALMOLOGY

## 2020-12-04 ENCOUNTER — DOCTOR'S OFFICE (OUTPATIENT)
Dept: URBAN - NONMETROPOLITAN AREA CLINIC 1 | Facility: CLINIC | Age: 65
Setting detail: OPHTHALMOLOGY
End: 2020-12-04

## 2020-12-04 DIAGNOSIS — Z96.1: ICD-10-CM

## 2020-12-04 PROBLEM — S05.01XA CORNEAL ABRASION WITHOUT FB; RIGHT EYE INITIAL ENCOUNTER: Status: RESOLVED | Noted: 2020-12-04 | Resolved: 2020-12-04

## 2020-12-04 PROBLEM — H25.11: Status: RESOLVED | Noted: 2020-11-20 | Resolved: 2020-12-04

## 2020-12-04 PROCEDURE — 99024 POSTOP FOLLOW-UP VISIT: CPT | Performed by: OPTOMETRIST

## 2020-12-04 ASSESSMENT — SPHEQUIV_DERIVED
OD_SPHEQUIV: -0.5
OS_SPHEQUIV: -1
OS_SPHEQUIV: 0.375
OD_SPHEQUIV: 0

## 2020-12-04 ASSESSMENT — REFRACTION_CURRENTRX
OS_ADD: +2.50
OS_SPHERE: +0.50
OS_AXIS: 059
OS_VPRISM_DIRECTION: PROGS
OS_CYLINDER: -1.50
OD_AXIS: 118
OD_VPRISM_DIRECTION: PROGS
OD_OVR_VA: 20/
OD_CYLINDER: -2.75
OD_SPHERE: +0.75
OS_OVR_VA: 20/
OD_ADD: +2.50

## 2020-12-04 ASSESSMENT — KERATOMETRY
OD_AXISANGLE_DEGREES: 125
OS_K1POWER_DIOPTERS: 46.00
OD_K2POWER_DIOPTERS: 47.50
OD_K1POWER_DIOPTERS: 44.50
OS_K2POWER_DIOPTERS: 47.00
OS_AXISANGLE_DEGREES: 067

## 2020-12-04 ASSESSMENT — REFRACTION_MANIFEST
OD_VA1: 20/25-2
OD_AXIS: 120
OD_ADD: +2.50
OS_CYLINDER: -1.25
OD_CYLINDER: -3.50
OS_VA2: 20/40+2
OS_AXIS: 075
OS_VA1: 20/40+2
OD_SPHERE: +1.75
OD_VA2: 20/25-2
OS_SPHERE: +1.00
OS_ADD: +2.50

## 2020-12-04 ASSESSMENT — REFRACTION_AUTOREFRACTION
OD_AXIS: 84
OD_CYLINDER: -1.00
OS_CYLINDER: -0.50
OD_SPHERE: 0.00
OS_SPHERE: -0.75
OS_AXIS: 77

## 2020-12-04 ASSESSMENT — AXIALLENGTH_DERIVED
OS_AL: 22.4066
OD_AL: 22.708
OD_AL: 22.89
OS_AL: 22.9005

## 2020-12-04 ASSESSMENT — VISUAL ACUITY
OD_BCVA: 20/40
OS_BCVA: 20/40

## 2020-12-04 ASSESSMENT — PACHYMETRY
OS_CT_CORRECTION: 1
OD_CT_CORRECTION: 1
OD_CT_UM: 531
OS_CT_UM: 538

## 2020-12-04 ASSESSMENT — TONOMETRY: OS_IOP_MMHG: 17

## 2020-12-04 ASSESSMENT — SUPERFICIAL PUNCTATE KERATITIS (SPK)
OS_SPK: 1+ 2+
OD_SPK: 1+ 2+

## 2020-12-16 ENCOUNTER — DOCTOR'S OFFICE (OUTPATIENT)
Dept: URBAN - NONMETROPOLITAN AREA CLINIC 1 | Facility: CLINIC | Age: 65
Setting detail: OPHTHALMOLOGY
End: 2020-12-16
Payer: COMMERCIAL

## 2020-12-16 ENCOUNTER — RX ONLY (RX ONLY)
Age: 65
End: 2020-12-16

## 2020-12-16 VITALS — HEIGHT: 55 IN

## 2020-12-16 DIAGNOSIS — Z96.1: ICD-10-CM

## 2020-12-16 PROCEDURE — 99024 POSTOP FOLLOW-UP VISIT: CPT | Performed by: OPHTHALMOLOGY

## 2020-12-16 ASSESSMENT — AXIALLENGTH_DERIVED
OD_AL: 22.708
OD_AL: 22.982
OS_AL: 22.4066

## 2020-12-16 ASSESSMENT — REFRACTION_CURRENTRX
OD_ADD: +2.50
OS_ADD: +2.50
OS_AXIS: 059
OS_OVR_VA: 20/
OD_OVR_VA: 20/
OS_CYLINDER: -1.50
OD_CYLINDER: -2.75
OD_AXIS: 118
OS_SPHERE: +0.50
OS_VPRISM_DIRECTION: PROGS
OD_VPRISM_DIRECTION: PROGS
OD_SPHERE: +0.75

## 2020-12-16 ASSESSMENT — REFRACTION_MANIFEST
OD_SPHERE: +1.75
OS_SPHERE: +1.00
OD_VA1: 20/25-2
OD_ADD: +2.50
OD_CYLINDER: -3.50
OS_ADD: +2.50
OS_VA1: 20/40+2
OS_CYLINDER: -1.25
OS_VA2: 20/40+2
OD_VA2: 20/25-2
OD_AXIS: 120
OS_AXIS: 075

## 2020-12-16 ASSESSMENT — TONOMETRY
OS_IOP_MMHG: 16
OD_IOP_MMHG: 18

## 2020-12-16 ASSESSMENT — SPHEQUIV_DERIVED
OD_SPHEQUIV: 0
OS_SPHEQUIV: 0.375
OD_SPHEQUIV: -0.75

## 2020-12-16 ASSESSMENT — PACHYMETRY
OS_CT_CORRECTION: 1
OD_CT_CORRECTION: 1
OD_CT_UM: 531
OS_CT_UM: 538

## 2020-12-16 ASSESSMENT — KERATOMETRY
OS_K2POWER_DIOPTERS: 47.00
OS_K1POWER_DIOPTERS: 46.00
OS_AXISANGLE_DEGREES: 067
OD_K1POWER_DIOPTERS: 44.50
OD_AXISANGLE_DEGREES: 125
OD_K2POWER_DIOPTERS: 47.50

## 2020-12-16 ASSESSMENT — CONFRONTATIONAL VISUAL FIELD TEST (CVF)
OD_FINDINGS: FULL
OS_FINDINGS: FULL

## 2020-12-16 ASSESSMENT — REFRACTION_AUTOREFRACTION
OD_SPHERE: -0.25
OD_CYLINDER: -1.00
OD_AXIS: 080
OS_SPHERE: -1.00

## 2020-12-16 ASSESSMENT — VISUAL ACUITY
OD_BCVA: 20/50-1
OS_BCVA: 20/30

## 2020-12-16 ASSESSMENT — SUPERFICIAL PUNCTATE KERATITIS (SPK)
OD_SPK: 1+ 2+
OS_SPK: 1+ 2+

## 2021-01-11 ENCOUNTER — DOCTOR'S OFFICE (OUTPATIENT)
Dept: URBAN - NONMETROPOLITAN AREA CLINIC 1 | Facility: CLINIC | Age: 66
Setting detail: OPHTHALMOLOGY
End: 2021-01-11
Payer: COMMERCIAL

## 2021-01-11 DIAGNOSIS — H43.813: ICD-10-CM

## 2021-01-11 PROCEDURE — 99024 POSTOP FOLLOW-UP VISIT: CPT | Performed by: OPHTHALMOLOGY

## 2021-01-11 PROCEDURE — 92134 CPTRZ OPH DX IMG PST SGM RTA: CPT | Performed by: OPHTHALMOLOGY

## 2021-01-11 PROCEDURE — 92201 OPSCPY EXTND RTA DRAW UNI/BI: CPT | Performed by: OPHTHALMOLOGY

## 2021-01-11 ASSESSMENT — REFRACTION_AUTOREFRACTION
OD_SPHERE: -0.25
OS_SPHERE: -1.00
OD_CYLINDER: -1.00
OD_AXIS: 080

## 2021-01-11 ASSESSMENT — KERATOMETRY
OS_K2POWER_DIOPTERS: 47.00
OD_K2POWER_DIOPTERS: 47.50
OD_AXISANGLE_DEGREES: 125
OS_K1POWER_DIOPTERS: 46.00
OS_AXISANGLE_DEGREES: 067
OD_K1POWER_DIOPTERS: 44.50

## 2021-01-11 ASSESSMENT — REFRACTION_MANIFEST
OD_ADD: +2.50
OD_AXIS: 120
OS_CYLINDER: -1.25
OS_SPHERE: +1.00
OD_CYLINDER: -3.50
OS_ADD: +2.50
OS_VA1: 20/40+2
OD_VA1: 20/25-2
OD_VA2: 20/25-2
OD_SPHERE: +1.75
OS_AXIS: 075
OS_VA2: 20/40+2

## 2021-01-11 ASSESSMENT — REFRACTION_CURRENTRX
OS_SPHERE: +0.50
OD_CYLINDER: -2.75
OS_OVR_VA: 20/
OS_AXIS: 059
OS_VPRISM_DIRECTION: PROGS
OD_AXIS: 118
OD_SPHERE: +0.75
OD_OVR_VA: 20/
OS_ADD: +2.50
OS_CYLINDER: -1.50
OD_ADD: +2.50
OD_VPRISM_DIRECTION: PROGS

## 2021-01-11 ASSESSMENT — VISUAL ACUITY
OD_BCVA: 20/50-1
OS_BCVA: 20/40-2

## 2021-01-11 ASSESSMENT — SPHEQUIV_DERIVED
OD_SPHEQUIV: 0
OD_SPHEQUIV: -0.75
OS_SPHEQUIV: 0.375

## 2021-01-11 ASSESSMENT — PACHYMETRY
OS_CT_UM: 538
OD_CT_CORRECTION: 1
OS_CT_CORRECTION: 1
OD_CT_UM: 531

## 2021-01-11 ASSESSMENT — TONOMETRY
OS_IOP_MMHG: 19
OD_IOP_MMHG: 21

## 2021-01-11 ASSESSMENT — CONFRONTATIONAL VISUAL FIELD TEST (CVF)
OD_FINDINGS: FULL
OS_FINDINGS: FULL

## 2021-01-11 ASSESSMENT — AXIALLENGTH_DERIVED
OD_AL: 22.708
OD_AL: 22.982
OS_AL: 22.4066

## 2021-01-11 ASSESSMENT — SUPERFICIAL PUNCTATE KERATITIS (SPK)
OD_SPK: 1+ 2+
OS_SPK: 1+ 2+

## 2021-01-12 ENCOUNTER — DOCTOR'S OFFICE (OUTPATIENT)
Dept: URBAN - NONMETROPOLITAN AREA CLINIC 1 | Facility: CLINIC | Age: 66
Setting detail: OPHTHALMOLOGY
End: 2021-01-12
Payer: COMMERCIAL

## 2021-01-12 ENCOUNTER — RX ONLY (RX ONLY)
Age: 66
End: 2021-01-12

## 2021-01-12 VITALS — HEIGHT: 55 IN

## 2021-01-12 DIAGNOSIS — Z96.1: ICD-10-CM

## 2021-01-12 PROCEDURE — 99024 POSTOP FOLLOW-UP VISIT: CPT | Performed by: OPHTHALMOLOGY

## 2021-01-12 ASSESSMENT — SPHEQUIV_DERIVED
OD_SPHEQUIV: -0.5
OS_SPHEQUIV: -0.5
OS_SPHEQUIV: 0.375
OD_SPHEQUIV: 0

## 2021-01-12 ASSESSMENT — LID EXAM ASSESSMENTS
OS_BLEPHARITIS: 1+
OD_BLEPHARITIS: 1+

## 2021-01-12 ASSESSMENT — REFRACTION_CURRENTRX
OS_OVR_VA: 20/
OS_CYLINDER: -1.50
OD_ADD: +2.50
OS_SPHERE: +0.50
OS_AXIS: 059
OD_OVR_VA: 20/
OD_SPHERE: +0.75
OD_CYLINDER: -2.75
OS_VPRISM_DIRECTION: PROGS
OS_ADD: +2.50
OD_AXIS: 118
OD_VPRISM_DIRECTION: PROGS

## 2021-01-12 ASSESSMENT — KERATOMETRY
OD_K2POWER_DIOPTERS: 47.50
OS_K1POWER_DIOPTERS: 46.00
OD_AXISANGLE_DEGREES: 125
OS_AXISANGLE_DEGREES: 067
OD_K1POWER_DIOPTERS: 44.50
OS_K2POWER_DIOPTERS: 47.00

## 2021-01-12 ASSESSMENT — CONFRONTATIONAL VISUAL FIELD TEST (CVF)
OS_FINDINGS: FULL
OD_FINDINGS: FULL

## 2021-01-12 ASSESSMENT — SUPERFICIAL PUNCTATE KERATITIS (SPK)
OD_SPK: 1+ 2+
OS_SPK: 1+ 2+

## 2021-01-12 ASSESSMENT — REFRACTION_AUTOREFRACTION
OS_AXIS: 180
OD_SPHERE: -0.25
OS_CYLINDER: 0.00
OD_CYLINDER: -0.50
OS_SPHERE: -0.50
OD_AXIS: 146

## 2021-01-12 ASSESSMENT — REFRACTION_MANIFEST
OS_VA2: 20/40+2
OD_CYLINDER: -3.50
OS_VA1: 20/40+2
OS_ADD: +2.50
OD_SPHERE: +1.75
OS_CYLINDER: -1.25
OD_AXIS: 120
OD_VA1: 20/25-2
OS_SPHERE: +1.00
OD_ADD: +2.50
OS_AXIS: 075
OD_VA2: 20/25-2

## 2021-01-12 ASSESSMENT — AXIALLENGTH_DERIVED
OD_AL: 22.708
OD_AL: 22.89
OS_AL: 22.7184
OS_AL: 22.4066

## 2021-01-12 ASSESSMENT — PACHYMETRY
OS_CT_UM: 538
OD_CT_UM: 531
OD_CT_CORRECTION: 1
OS_CT_CORRECTION: 1

## 2021-01-12 ASSESSMENT — VISUAL ACUITY
OD_BCVA: 20/50+2
OS_BCVA: 20/50

## 2021-01-12 ASSESSMENT — TONOMETRY
OS_IOP_MMHG: 13
OD_IOP_MMHG: 13

## 2021-01-20 ENCOUNTER — RX ONLY (RX ONLY)
Age: 66
End: 2021-01-20

## 2021-01-20 ENCOUNTER — DOCTOR'S OFFICE (OUTPATIENT)
Dept: URBAN - NONMETROPOLITAN AREA CLINIC 1 | Facility: CLINIC | Age: 66
Setting detail: OPHTHALMOLOGY
End: 2021-01-20
Payer: COMMERCIAL

## 2021-01-20 DIAGNOSIS — Z96.1: ICD-10-CM

## 2021-01-20 DIAGNOSIS — H01.004: ICD-10-CM

## 2021-01-20 DIAGNOSIS — H01.005: ICD-10-CM

## 2021-01-20 DIAGNOSIS — H10.503: ICD-10-CM

## 2021-01-20 DIAGNOSIS — H01.002: ICD-10-CM

## 2021-01-20 DIAGNOSIS — H01.001: ICD-10-CM

## 2021-01-20 PROCEDURE — 99024 POSTOP FOLLOW-UP VISIT: CPT | Performed by: OPHTHALMOLOGY

## 2021-01-20 ASSESSMENT — REFRACTION_MANIFEST
OS_ADD: +2.50
OS_AXIS: 150
OD_AXIS: 115
OS_SPHERE: -0.25
OS_VA1: 20/25-2
OD_VA2: 20/25-2
OS_VA2: 20/40+2
OS_CYLINDER: -0.25
OD_VA1: 20/25-2
OD_CYLINDER: -1.25
OD_ADD: +2.50
OD_SPHERE: PLANO

## 2021-01-20 ASSESSMENT — SPHEQUIV_DERIVED
OS_SPHEQUIV: -0.375
OD_SPHEQUIV: -0.25
OS_SPHEQUIV: -0.5

## 2021-01-20 ASSESSMENT — REFRACTION_CURRENTRX
OS_VPRISM_DIRECTION: PROGS
OD_ADD: +2.50
OD_SPHERE: +0.75
OD_CYLINDER: -2.75
OS_CYLINDER: -1.50
OD_AXIS: 118
OS_SPHERE: +0.50
OS_ADD: +2.50
OD_VPRISM_DIRECTION: PROGS
OS_OVR_VA: 20/
OS_AXIS: 059
OD_OVR_VA: 20/

## 2021-01-20 ASSESSMENT — SUPERFICIAL PUNCTATE KERATITIS (SPK)
OD_SPK: 1+ 2+
OS_SPK: 1+ 2+

## 2021-01-20 ASSESSMENT — KERATOMETRY
METHOD_AUTO_MANUAL: MANUAL
OD_K1POWER_DIOPTERS: 44.50
OD_K2POWER_DIOPTERS: 47.00
OD_AXISANGLE_DEGREES: 045

## 2021-01-20 ASSESSMENT — TONOMETRY
OD_IOP_MMHG: 17
OS_IOP_MMHG: 16

## 2021-01-20 ASSESSMENT — PACHYMETRY
OD_CT_UM: 531
OS_CT_UM: 538
OS_CT_CORRECTION: 1
OD_CT_CORRECTION: 1

## 2021-01-20 ASSESSMENT — CONFRONTATIONAL VISUAL FIELD TEST (CVF)
OS_FINDINGS: FULL
OD_FINDINGS: FULL

## 2021-01-20 ASSESSMENT — REFRACTION_AUTOREFRACTION
OS_SPHERE: -0.50
OD_SPHERE: +0.25
OS_CYLINDER: 0.00
OD_CYLINDER: -1.00
OD_AXIS: 113

## 2021-01-20 ASSESSMENT — VISUAL ACUITY
OD_BCVA: 20/25-1
OS_BCVA: 20/50

## 2021-01-20 ASSESSMENT — LID EXAM ASSESSMENTS
OD_BLEPHARITIS: 1+
OS_BLEPHARITIS: 1+

## 2021-01-20 ASSESSMENT — AXIALLENGTH_DERIVED: OD_AL: 22.8847

## 2021-02-17 ENCOUNTER — DOCTOR'S OFFICE (OUTPATIENT)
Dept: URBAN - NONMETROPOLITAN AREA CLINIC 1 | Facility: CLINIC | Age: 66
Setting detail: OPHTHALMOLOGY
End: 2021-02-17
Payer: COMMERCIAL

## 2021-02-17 DIAGNOSIS — H52.221: ICD-10-CM

## 2021-02-17 DIAGNOSIS — H26.491: ICD-10-CM

## 2021-02-17 DIAGNOSIS — Z96.1: ICD-10-CM

## 2021-02-17 PROBLEM — H10.503 BLEPHAROCONJUNCTIVITS NOS; BOTH EYES: Status: RESOLVED | Noted: 2021-01-12 | Resolved: 2021-02-17

## 2021-02-17 PROCEDURE — 99024 POSTOP FOLLOW-UP VISIT: CPT | Performed by: OPHTHALMOLOGY

## 2021-02-17 ASSESSMENT — CONFRONTATIONAL VISUAL FIELD TEST (CVF)
OS_FINDINGS: FULL
OD_FINDINGS: FULL

## 2021-02-17 ASSESSMENT — SUPERFICIAL PUNCTATE KERATITIS (SPK)
OS_SPK: T 1+
OD_SPK: T 1+

## 2021-02-18 ASSESSMENT — REFRACTION_CURRENTRX
OS_SPHERE: +0.50
OD_AXIS: 118
OS_ADD: +2.50
OD_SPHERE: +0.75
OS_VPRISM_DIRECTION: PROGS
OD_ADD: +2.50
OD_CYLINDER: -2.75
OS_OVR_VA: 20/
OS_CYLINDER: -1.50
OD_VPRISM_DIRECTION: PROGS
OS_AXIS: 059
OD_OVR_VA: 20/

## 2021-02-18 ASSESSMENT — REFRACTION_MANIFEST
OS_SPHERE: -0.25
OD_ADD: +2.50
OD_CYLINDER: -0.75
OD_VA2: 20/25-2
OS_CYLINDER: -0.25
OD_SPHERE: -0.25
OD_VA1: 20/25-2
OS_VA2: 20/30
OD_AXIS: 100
OS_ADD: +2.50
OS_VA1: 20/25-2
OS_AXIS: 150

## 2021-02-18 ASSESSMENT — REFRACTION_AUTOREFRACTION
OS_AXIS: 062
OD_AXIS: 099
OD_CYLINDER: -0.50
OS_CYLINDER: -0.25
OD_SPHERE: -0.25
OS_SPHERE: -0.25

## 2021-02-18 ASSESSMENT — AXIALLENGTH_DERIVED
OS_AL: 22.8873
OD_AL: 23.13
OS_AL: 22.8873
OD_AL: 23.087

## 2021-02-18 ASSESSMENT — SPHEQUIV_DERIVED
OS_SPHEQUIV: -0.375
OD_SPHEQUIV: -0.5
OD_SPHEQUIV: -0.625
OS_SPHEQUIV: -0.375

## 2021-02-18 ASSESSMENT — KERATOMETRY
METHOD_AUTO_MANUAL: MANUAL
OD_K2POWER_DIOPTERS: 46.87
OS_AXISANGLE_DEGREES: 135
OD_K1POWER_DIOPTERS: 44.00
OD_AXISANGLE_DEGREES: 45
OS_K2POWER_DIOPTERS: 46.25
OS_K1POWER_DIOPTERS: 45.50

## 2021-02-18 ASSESSMENT — VISUAL ACUITY
OS_BCVA: 20/30-1
OD_BCVA: 20/40

## 2021-03-01 DIAGNOSIS — Z23 ENCOUNTER FOR IMMUNIZATION: ICD-10-CM

## 2021-08-11 ENCOUNTER — RX ONLY (RX ONLY)
Age: 66
End: 2021-08-11

## 2021-08-11 ENCOUNTER — DOCTOR'S OFFICE (OUTPATIENT)
Dept: URBAN - NONMETROPOLITAN AREA CLINIC 1 | Facility: CLINIC | Age: 66
Setting detail: OPHTHALMOLOGY
End: 2021-08-11
Payer: COMMERCIAL

## 2021-08-11 DIAGNOSIS — H16.223: ICD-10-CM

## 2021-08-11 DIAGNOSIS — H40.033: ICD-10-CM

## 2021-08-11 DIAGNOSIS — H40.013: ICD-10-CM

## 2021-08-11 DIAGNOSIS — H26.491: ICD-10-CM

## 2021-08-11 DIAGNOSIS — H16.222: ICD-10-CM

## 2021-08-11 DIAGNOSIS — H43.813: ICD-10-CM

## 2021-08-11 PROCEDURE — 92014 COMPRE OPH EXAM EST PT 1/>: CPT | Performed by: OPHTHALMOLOGY

## 2021-08-11 PROCEDURE — 83861 MICROFLUID ANALY TEARS: CPT | Performed by: OPHTHALMOLOGY

## 2021-08-11 PROCEDURE — 92133 CPTRZD OPH DX IMG PST SGM ON: CPT | Performed by: OPHTHALMOLOGY

## 2021-08-11 ASSESSMENT — REFRACTION_MANIFEST
OD_ADD: +2.50
OS_ADD: +2.50
OS_CYLINDER: -0.25
OS_AXIS: 150
OD_CYLINDER: -0.75
OD_SPHERE: -0.25
OD_VA2: 20/25-2
OD_AXIS: 100
OS_SPHERE: -0.25
OD_VA1: 20/25-2
OS_VA1: 20/25-2
OS_VA2: 20/30

## 2021-08-11 ASSESSMENT — SUPERFICIAL PUNCTATE KERATITIS (SPK)
OD_SPK: T 1+
OS_SPK: T 1+

## 2021-08-11 ASSESSMENT — KERATOMETRY
METHOD_AUTO_MANUAL: MANUAL
OS_K1POWER_DIOPTERS: 45.50
OD_K1POWER_DIOPTERS: 44.00
OS_K2POWER_DIOPTERS: 46.25
OD_AXISANGLE_DEGREES: 45
OD_K2POWER_DIOPTERS: 46.87
OS_AXISANGLE_DEGREES: 135

## 2021-08-11 ASSESSMENT — PACHYMETRY
OD_CT_CORRECTION: 1
OS_CT_CORRECTION: 1
OS_CT_UM: 538
OD_CT_UM: 531

## 2021-08-11 ASSESSMENT — AXIALLENGTH_DERIVED
OD_AL: 23.13
OD_AL: 22.9941
OS_AL: 22.8873
OS_AL: 22.5264

## 2021-08-11 ASSESSMENT — REFRACTION_CURRENTRX
OD_OVR_VA: 20/
OD_SPHERE: +1.75
OS_OVR_VA: 20/
OS_SPHERE: +1.75

## 2021-08-11 ASSESSMENT — TONOMETRY
OD_IOP_MMHG: 17
OS_IOP_MMHG: 15

## 2021-08-11 ASSESSMENT — CONFRONTATIONAL VISUAL FIELD TEST (CVF)
OS_FINDINGS: FULL
OD_FINDINGS: FULL

## 2021-08-11 ASSESSMENT — VISUAL ACUITY
OS_BCVA: 20/30-2
OD_BCVA: 20/40+1

## 2021-08-11 ASSESSMENT — REFRACTION_AUTOREFRACTION
OD_SPHERE: +0.50
OS_AXIS: 092
OS_SPHERE: +0.75
OD_CYLINDER: -1.50
OS_CYLINDER: -0.25
OD_AXIS: 114

## 2021-08-11 ASSESSMENT — SPHEQUIV_DERIVED
OD_SPHEQUIV: -0.25
OD_SPHEQUIV: -0.625
OS_SPHEQUIV: 0.625
OS_SPHEQUIV: -0.375

## 2021-09-07 ENCOUNTER — AMBUL SURGICAL CARE (OUTPATIENT)
Dept: URBAN - NONMETROPOLITAN AREA SURGERY 1 | Facility: SURGERY | Age: 66
Setting detail: OPHTHALMOLOGY
End: 2021-09-07
Payer: COMMERCIAL

## 2021-09-07 DIAGNOSIS — H26.491: ICD-10-CM

## 2021-09-07 PROCEDURE — G8918 PT W/O PREOP ORDER IV AB PRO: HCPCS | Performed by: OPHTHALMOLOGY

## 2021-09-07 PROCEDURE — G8907 PT DOC NO EVENTS ON DISCHARG: HCPCS | Performed by: OPHTHALMOLOGY

## 2021-09-07 PROCEDURE — 66821 AFTER CATARACT LASER SURGERY: CPT | Performed by: OPHTHALMOLOGY

## 2021-09-14 ENCOUNTER — AMBUL SURGICAL CARE (OUTPATIENT)
Dept: URBAN - NONMETROPOLITAN AREA SURGERY 1 | Facility: SURGERY | Age: 66
Setting detail: OPHTHALMOLOGY
End: 2021-09-14
Payer: COMMERCIAL

## 2021-09-14 DIAGNOSIS — H26.492: ICD-10-CM

## 2021-09-14 PROCEDURE — 66821 AFTER CATARACT LASER SURGERY: CPT | Performed by: OPHTHALMOLOGY

## 2021-09-14 PROCEDURE — G8907 PT DOC NO EVENTS ON DISCHARG: HCPCS | Performed by: OPHTHALMOLOGY

## 2021-09-14 PROCEDURE — G8918 PT W/O PREOP ORDER IV AB PRO: HCPCS | Performed by: OPHTHALMOLOGY

## 2022-06-07 ENCOUNTER — DOCTOR'S OFFICE (OUTPATIENT)
Dept: URBAN - NONMETROPOLITAN AREA CLINIC 1 | Facility: CLINIC | Age: 67
Setting detail: OPHTHALMOLOGY
End: 2022-06-07
Payer: COMMERCIAL

## 2022-06-07 DIAGNOSIS — H52.201: ICD-10-CM

## 2022-06-07 DIAGNOSIS — Z96.1: ICD-10-CM

## 2022-06-07 DIAGNOSIS — H52.4: ICD-10-CM

## 2022-06-07 DIAGNOSIS — H16.221: ICD-10-CM

## 2022-06-07 DIAGNOSIS — H16.222: ICD-10-CM

## 2022-06-07 DIAGNOSIS — H40.013: ICD-10-CM

## 2022-06-07 PROCEDURE — 92133 CPTRZD OPH DX IMG PST SGM ON: CPT | Performed by: OPHTHALMOLOGY

## 2022-06-07 PROCEDURE — 99024 POSTOP FOLLOW-UP VISIT: CPT | Performed by: OPHTHALMOLOGY

## 2022-06-07 PROCEDURE — 92015 DETERMINE REFRACTIVE STATE: CPT | Performed by: OPHTHALMOLOGY

## 2022-06-07 ASSESSMENT — CONFRONTATIONAL VISUAL FIELD TEST (CVF)
OD_FINDINGS: FULL
OS_FINDINGS: FULL

## 2022-06-07 ASSESSMENT — REFRACTION_MANIFEST
OD_ADD: +2.50
OS_ADD: +2.50
OS_AXIS: 060
OS_SPHERE: -0.75
OS_VA2: 20/25+2
OD_VA2: 20/25+2
OD_CYLINDER: -1.25
OD_SPHERE: PLANO
OD_VA1: 20/25+2
OS_CYLINDER: -0.75
OU_VA: 20/20-2
OS_VA1: 20/25+2
OD_AXIS: 100

## 2022-06-07 ASSESSMENT — REFRACTION_AUTOREFRACTION
OD_SPHERE: +0.25
OS_SPHERE: -0.75
OD_CYLINDER: -1.25
OS_AXIS: 059
OD_AXIS: 101
OS_CYLINDER: -0.75

## 2022-06-07 ASSESSMENT — KERATOMETRY
OS_K2POWER_DIOPTERS: 46.25
OS_K1POWER_DIOPTERS: 45.50
OS_AXISANGLE_DEGREES: 135
OD_AXISANGLE_DEGREES: 45
METHOD_AUTO_MANUAL: MANUAL
OD_K1POWER_DIOPTERS: 44.00
OD_K2POWER_DIOPTERS: 46.87

## 2022-06-07 ASSESSMENT — VISUAL ACUITY
OS_BCVA: 20/40-1
OD_BCVA: 20/40-2

## 2022-06-07 ASSESSMENT — AXIALLENGTH_DERIVED
OS_AL: 23.1657
OS_AL: 23.1657
OD_AL: 23.0405

## 2022-06-07 ASSESSMENT — REFRACTION_CURRENTRX
OD_SPHERE: +1.75
OS_SPHERE: +1.75
OD_OVR_VA: 20/
OS_OVR_VA: 20/

## 2022-06-07 ASSESSMENT — SPHEQUIV_DERIVED
OD_SPHEQUIV: -0.375
OS_SPHEQUIV: -1.125
OS_SPHEQUIV: -1.125

## 2022-06-07 ASSESSMENT — SUPERFICIAL PUNCTATE KERATITIS (SPK)
OD_SPK: T 1+
OS_SPK: T 1+

## 2022-06-16 ENCOUNTER — AMBUL SURGICAL CARE (OUTPATIENT)
Dept: URBAN - NONMETROPOLITAN AREA SURGERY 1 | Facility: SURGERY | Age: 67
Setting detail: OPHTHALMOLOGY
End: 2022-06-16

## 2022-06-16 DIAGNOSIS — H52.201: ICD-10-CM

## 2022-06-16 PROCEDURE — 65772 CORRECTION OF ASTIGMATISM: CPT | Performed by: OPHTHALMOLOGY

## 2022-06-16 PROCEDURE — G8907 PT DOC NO EVENTS ON DISCHARG: HCPCS | Performed by: OPHTHALMOLOGY

## 2022-06-16 PROCEDURE — G8918 PT W/O PREOP ORDER IV AB PRO: HCPCS | Performed by: OPHTHALMOLOGY

## 2022-06-17 ENCOUNTER — RX ONLY (RX ONLY)
Age: 67
End: 2022-06-17

## 2022-06-17 ENCOUNTER — DOCTOR'S OFFICE (OUTPATIENT)
Dept: URBAN - NONMETROPOLITAN AREA CLINIC 1 | Facility: CLINIC | Age: 67
Setting detail: OPHTHALMOLOGY
End: 2022-06-17

## 2022-06-17 DIAGNOSIS — Z96.1: ICD-10-CM

## 2022-06-17 DIAGNOSIS — H16.221: ICD-10-CM

## 2022-06-17 DIAGNOSIS — H16.222: ICD-10-CM

## 2022-06-17 DIAGNOSIS — H52.201: ICD-10-CM

## 2022-06-17 DIAGNOSIS — H52.4: ICD-10-CM

## 2022-06-17 PROCEDURE — 99024 POSTOP FOLLOW-UP VISIT: CPT | Performed by: OPTOMETRIST

## 2022-06-17 ASSESSMENT — REFRACTION_CURRENTRX
OD_OVR_VA: 20/
OS_SPHERE: +1.75
OD_SPHERE: +1.75
OS_OVR_VA: 20/

## 2022-06-17 ASSESSMENT — REFRACTION_AUTOREFRACTION
OD_AXIS: 156
OS_CYLINDER: -0.75
OS_AXIS: 059
OD_CYLINDER: -1.00
OS_SPHERE: -0.75
OD_SPHERE: 0.00

## 2022-06-17 ASSESSMENT — REFRACTION_MANIFEST
OS_VA2: 20/25+2
OD_SPHERE: PLANO
OD_VA1: 20/25+2
OU_VA: 20/20-2
OS_ADD: +2.50
OS_CYLINDER: -0.75
OD_VA2: 20/25+2
OS_AXIS: 060
OD_ADD: +2.50
OS_SPHERE: -0.75
OD_CYLINDER: -1.25
OS_VA1: 20/25+2
OD_AXIS: 100

## 2022-06-17 ASSESSMENT — VISUAL ACUITY
OD_BCVA: 20/40-2
OS_BCVA: 20/25+2

## 2022-06-17 ASSESSMENT — SPHEQUIV_DERIVED
OD_SPHEQUIV: -0.5
OS_SPHEQUIV: -1.125
OS_SPHEQUIV: -1.125

## 2022-06-17 ASSESSMENT — KERATOMETRY
METHOD_AUTO_MANUAL: MANUAL
OD_K1POWER_DIOPTERS: 44.00
OS_K1POWER_DIOPTERS: 45.50
OD_AXISANGLE_DEGREES: 45
OD_K2POWER_DIOPTERS: 46.87
OS_K2POWER_DIOPTERS: 46.25
OS_AXISANGLE_DEGREES: 135

## 2022-06-17 ASSESSMENT — AXIALLENGTH_DERIVED
OS_AL: 23.1657
OS_AL: 23.1657
OD_AL: 23.087

## 2022-06-17 ASSESSMENT — PACHYMETRY
OS_CT_UM: 538
OS_CT_CORRECTION: 1
OD_CT_CORRECTION: 1
OD_CT_UM: 531

## 2022-06-17 ASSESSMENT — SUPERFICIAL PUNCTATE KERATITIS (SPK)
OD_SPK: T 1+
OS_SPK: T 1+

## 2022-06-17 ASSESSMENT — TONOMETRY: OD_IOP_MMHG: 15

## 2022-06-23 ENCOUNTER — AMBUL SURGICAL CARE (OUTPATIENT)
Dept: URBAN - NONMETROPOLITAN AREA SURGERY 1 | Facility: SURGERY | Age: 67
Setting detail: OPHTHALMOLOGY
End: 2022-06-23

## 2022-06-23 DIAGNOSIS — H52.202: ICD-10-CM

## 2022-06-23 PROCEDURE — 65772 CORRECTION OF ASTIGMATISM: CPT | Performed by: OPHTHALMOLOGY

## 2022-06-23 PROCEDURE — G8918 PT W/O PREOP ORDER IV AB PRO: HCPCS | Performed by: OPHTHALMOLOGY

## 2022-06-23 PROCEDURE — G8907 PT DOC NO EVENTS ON DISCHARG: HCPCS | Performed by: OPHTHALMOLOGY

## 2022-06-24 ENCOUNTER — DOCTOR'S OFFICE (OUTPATIENT)
Dept: URBAN - NONMETROPOLITAN AREA CLINIC 1 | Facility: CLINIC | Age: 67
Setting detail: OPHTHALMOLOGY
End: 2022-06-24
Payer: COMMERCIAL

## 2022-06-24 DIAGNOSIS — H16.221: ICD-10-CM

## 2022-06-24 DIAGNOSIS — H16.222: ICD-10-CM

## 2022-06-24 DIAGNOSIS — H52.4: ICD-10-CM

## 2022-06-24 DIAGNOSIS — H52.201: ICD-10-CM

## 2022-06-24 PROCEDURE — 99024 POSTOP FOLLOW-UP VISIT: CPT | Performed by: OPHTHALMOLOGY

## 2022-06-24 ASSESSMENT — SPHEQUIV_DERIVED
OS_SPHEQUIV: -1.125
OD_SPHEQUIV: 0.375
OS_SPHEQUIV: 0.125

## 2022-06-24 ASSESSMENT — REFRACTION_MANIFEST
OS_VA1: 20/25+2
OS_VA2: 20/25+2
OD_ADD: +2.50
OD_VA1: 20/25+2
OD_VA2: 20/25+2
OU_VA: 20/20-2
OS_CYLINDER: -0.75
OS_SPHERE: -0.75
OD_AXIS: 100
OD_CYLINDER: -1.25
OS_AXIS: 060
OD_SPHERE: PLANO
OS_ADD: +2.50

## 2022-06-24 ASSESSMENT — KERATOMETRY
OS_K1POWER_DIOPTERS: 45.50
OD_K2POWER_DIOPTERS: 46.87
METHOD_AUTO_MANUAL: MANUAL
OS_K2POWER_DIOPTERS: 46.25
OD_AXISANGLE_DEGREES: 45
OD_K1POWER_DIOPTERS: 44.00
OS_AXISANGLE_DEGREES: 135

## 2022-06-24 ASSESSMENT — REFRACTION_AUTOREFRACTION
OD_AXIS: 171
OD_SPHERE: +1.25
OS_AXIS: 135
OS_CYLINDER: -1.75
OD_CYLINDER: -1.75
OS_SPHERE: +1.00

## 2022-06-24 ASSESSMENT — SUPERFICIAL PUNCTATE KERATITIS (SPK)
OS_SPK: T 1+
OD_SPK: T 1+

## 2022-06-24 ASSESSMENT — CONFRONTATIONAL VISUAL FIELD TEST (CVF)
OS_FINDINGS: FULL
OD_FINDINGS: FULL

## 2022-06-24 ASSESSMENT — REFRACTION_CURRENTRX
OD_SPHERE: +1.75
OD_OVR_VA: 20/
OS_OVR_VA: 20/
OS_SPHERE: +1.75

## 2022-06-24 ASSESSMENT — VISUAL ACUITY
OS_BCVA: 20/25
OD_BCVA: 20/70

## 2022-06-24 ASSESSMENT — AXIALLENGTH_DERIVED
OD_AL: 22.7651
OS_AL: 23.1657
OS_AL: 22.7055

## 2022-07-19 ENCOUNTER — DOCTOR'S OFFICE (OUTPATIENT)
Dept: URBAN - NONMETROPOLITAN AREA CLINIC 1 | Facility: CLINIC | Age: 67
Setting detail: OPHTHALMOLOGY
End: 2022-07-19
Payer: COMMERCIAL

## 2022-07-19 DIAGNOSIS — H16.222: ICD-10-CM

## 2022-07-19 DIAGNOSIS — H52.4: ICD-10-CM

## 2022-07-19 DIAGNOSIS — H16.221: ICD-10-CM

## 2022-07-19 DIAGNOSIS — H52.201: ICD-10-CM

## 2022-07-19 DIAGNOSIS — H10.12: ICD-10-CM

## 2022-07-19 PROCEDURE — 99024 POSTOP FOLLOW-UP VISIT: CPT | Performed by: OPHTHALMOLOGY

## 2022-07-19 PROCEDURE — 92025 CPTRIZED CORNEAL TOPOGRAPHY: CPT | Performed by: OPHTHALMOLOGY

## 2022-07-19 ASSESSMENT — REFRACTION_AUTOREFRACTION
OS_SPHERE: -0.75
OD_CYLINDER: -1.00
OS_CYLINDER: -1.25
OD_SPHERE: PL
OD_AXIS: 166
OS_AXIS: 154

## 2022-07-19 ASSESSMENT — KERATOMETRY
METHOD_AUTO_MANUAL: MANUAL
OS_K1POWER_DIOPTERS: 45.50
OD_AXISANGLE_DEGREES: 45
OD_K2POWER_DIOPTERS: 46.87
OS_K2POWER_DIOPTERS: 46.25
OS_AXISANGLE_DEGREES: 135
OD_K1POWER_DIOPTERS: 44.00

## 2022-07-19 ASSESSMENT — SUPERFICIAL PUNCTATE KERATITIS (SPK)
OS_SPK: T 1+
OD_SPK: T 1+

## 2022-07-19 ASSESSMENT — VISUAL ACUITY
OD_BCVA: 20/70
OS_BCVA: 20/25

## 2022-07-19 ASSESSMENT — REFRACTION_MANIFEST
OS_VA1: 20/25+2
OD_VA1: 20/25+2
OU_VA: 20/20-2
OS_CYLINDER: -0.75
OS_SPHERE: -0.75
OD_VA2: 20/25+2
OD_CYLINDER: -1.25
OD_SPHERE: PLANO
OS_AXIS: 060
OD_ADD: +2.50
OS_ADD: +2.50
OD_AXIS: 100
OS_VA2: 20/25+2

## 2022-07-19 ASSESSMENT — REFRACTION_CURRENTRX
OS_SPHERE: +1.75
OD_SPHERE: +1.75
OS_OVR_VA: 20/
OD_OVR_VA: 20/

## 2022-07-19 ASSESSMENT — TONOMETRY
OS_IOP_MMHG: 10
OD_IOP_MMHG: 11

## 2022-07-19 ASSESSMENT — AXIALLENGTH_DERIVED
OS_AL: 23.26
OS_AL: 23.1657

## 2022-07-19 ASSESSMENT — PACHYMETRY
OS_CT_UM: 538
OS_CT_CORRECTION: 1
OD_CT_CORRECTION: 1
OD_CT_UM: 531

## 2022-07-19 ASSESSMENT — SPHEQUIV_DERIVED
OS_SPHEQUIV: -1.375
OS_SPHEQUIV: -1.125

## 2022-08-23 ENCOUNTER — DOCTOR'S OFFICE (OUTPATIENT)
Dept: URBAN - NONMETROPOLITAN AREA CLINIC 1 | Facility: CLINIC | Age: 67
Setting detail: OPHTHALMOLOGY
End: 2022-08-23
Payer: COMMERCIAL

## 2022-08-23 DIAGNOSIS — H40.033: ICD-10-CM

## 2022-08-23 DIAGNOSIS — H16.221: ICD-10-CM

## 2022-08-23 DIAGNOSIS — Z96.1: ICD-10-CM

## 2022-08-23 DIAGNOSIS — H52.201: ICD-10-CM

## 2022-08-23 DIAGNOSIS — H16.222: ICD-10-CM

## 2022-08-23 PROBLEM — H01.004 BLEPHARITIS; RIGHT UPPER LID, RIGHT LOWER LID, LEFT UPPER LID, LEFT LOWER LID: Status: ACTIVE | Noted: 2021-01-12

## 2022-08-23 PROBLEM — H01.001 BLEPHARITIS; RIGHT UPPER LID, RIGHT LOWER LID, LEFT UPPER LID, LEFT LOWER LID: Status: ACTIVE | Noted: 2021-01-12

## 2022-08-23 PROBLEM — H01.005 BLEPHARITIS; RIGHT UPPER LID, RIGHT LOWER LID, LEFT UPPER LID, LEFT LOWER LID: Status: ACTIVE | Noted: 2021-01-12

## 2022-08-23 PROBLEM — H40.013: Status: ACTIVE | Noted: 2020-10-14

## 2022-08-23 PROBLEM — H01.002 BLEPHARITIS; RIGHT UPPER LID, RIGHT LOWER LID, LEFT UPPER LID, LEFT LOWER LID: Status: ACTIVE | Noted: 2021-01-12

## 2022-08-23 PROCEDURE — 99024 POSTOP FOLLOW-UP VISIT: CPT | Performed by: OPHTHALMOLOGY

## 2022-08-23 ASSESSMENT — REFRACTION_MANIFEST
OD_CYLINDER: -1.25
OD_SPHERE: PLANO
OS_VA2: 20/25+2
OD_ADD: +2.50
OD_VA1: 20/25+2
OU_VA: 20/20-2
OS_AXIS: 060
OS_VA1: 20/25+2
OS_ADD: +2.50
OD_AXIS: 100
OS_CYLINDER: -0.75
OD_VA2: 20/25+2
OS_SPHERE: -0.75

## 2022-08-23 ASSESSMENT — REFRACTION_AUTOREFRACTION
OD_AXIS: 073
OD_SPHERE: +0.25
OD_CYLINDER: -1.25
OS_CYLINDER: -1.00
OS_SPHERE: 0.00
OS_AXIS: 054

## 2022-08-23 ASSESSMENT — VISUAL ACUITY
OD_BCVA: 20/60
OS_BCVA: 20/25+1

## 2022-08-23 ASSESSMENT — KERATOMETRY
OD_K1POWER_DIOPTERS: 44.00
METHOD_AUTO_MANUAL: MANUAL
OD_K2POWER_DIOPTERS: 46.87
OS_K2POWER_DIOPTERS: 46.25
OS_AXISANGLE_DEGREES: 135
OD_AXISANGLE_DEGREES: 45
OS_K1POWER_DIOPTERS: 45.50

## 2022-08-23 ASSESSMENT — SPHEQUIV_DERIVED
OS_SPHEQUIV: -0.5
OS_SPHEQUIV: -1.125
OD_SPHEQUIV: -0.375

## 2022-08-23 ASSESSMENT — CONFRONTATIONAL VISUAL FIELD TEST (CVF)
OD_FINDINGS: FULL
OS_FINDINGS: FULL

## 2022-08-23 ASSESSMENT — SUPERFICIAL PUNCTATE KERATITIS (SPK)
OD_SPK: T 1+
OS_SPK: T 1+

## 2022-08-23 ASSESSMENT — AXIALLENGTH_DERIVED
OS_AL: 23.1657
OD_AL: 23.0405
OS_AL: 22.9333

## 2022-08-23 ASSESSMENT — REFRACTION_CURRENTRX
OD_SPHERE: +1.75
OS_SPHERE: +1.75
OD_OVR_VA: 20/
OS_OVR_VA: 20/

## 2022-09-14 ENCOUNTER — HOSPITAL ENCOUNTER (OUTPATIENT)
Dept: RADIOLOGY | Facility: CLINIC | Age: 67
Discharge: HOME | End: 2022-09-14
Payer: COMMERCIAL

## 2022-09-14 VITALS — BODY MASS INDEX: 32.49 KG/M2 | HEIGHT: 67 IN | WEIGHT: 207 LBS

## 2022-09-14 DIAGNOSIS — R92.8 ABNORMAL MAMMOGRAM: ICD-10-CM

## 2022-09-14 DIAGNOSIS — N61.0 MASTITIS WITHOUT ABSCESS: ICD-10-CM

## 2022-09-14 PROCEDURE — 77066 DX MAMMO INCL CAD BI: CPT

## 2022-09-14 PROCEDURE — 76642 ULTRASOUND BREAST LIMITED: CPT

## 2022-09-14 PROCEDURE — G0279 TOMOSYNTHESIS, MAMMO: HCPCS

## 2023-02-15 ENCOUNTER — RX ONLY (RX ONLY)
Age: 68
End: 2023-02-15

## 2023-02-15 ENCOUNTER — DOCTOR'S OFFICE (OUTPATIENT)
Dept: URBAN - NONMETROPOLITAN AREA CLINIC 1 | Facility: CLINIC | Age: 68
Setting detail: OPHTHALMOLOGY
End: 2023-02-15
Payer: COMMERCIAL

## 2023-02-15 VITALS — HEIGHT: 55 IN

## 2023-02-15 DIAGNOSIS — H40.033: ICD-10-CM

## 2023-02-15 DIAGNOSIS — H16.222: ICD-10-CM

## 2023-02-15 DIAGNOSIS — H16.221: ICD-10-CM

## 2023-02-15 DIAGNOSIS — Z96.1: ICD-10-CM

## 2023-02-15 DIAGNOSIS — H40.013: ICD-10-CM

## 2023-02-15 PROCEDURE — 92250 FUNDUS PHOTOGRAPHY W/I&R: CPT | Performed by: OPHTHALMOLOGY

## 2023-02-15 PROCEDURE — 99213 OFFICE O/P EST LOW 20 MIN: CPT | Performed by: OPHTHALMOLOGY

## 2023-02-15 PROCEDURE — 83861 MICROFLUID ANALY TEARS: CPT | Performed by: OPHTHALMOLOGY

## 2023-02-15 ASSESSMENT — CONFRONTATIONAL VISUAL FIELD TEST (CVF)
OD_FINDINGS: FULL
OS_FINDINGS: FULL

## 2023-02-15 ASSESSMENT — REFRACTION_AUTOREFRACTION
OD_SPHERE: +0.25
OD_CYLINDER: -1.25
OD_AXIS: 073
OS_AXIS: 054
OS_SPHERE: 0.00
OS_CYLINDER: -1.00

## 2023-02-15 ASSESSMENT — REFRACTION_MANIFEST
OS_AXIS: 060
OS_VA2: 20/25+2
OU_VA: 20/20-2
OD_SPHERE: PLANO
OS_CYLINDER: -0.75
OD_AXIS: 100
OD_ADD: +2.50
OS_VA1: 20/25+2
OS_SPHERE: -0.75
OD_VA2: 20/25+2
OD_VA1: 20/25+2
OS_ADD: +2.50
OD_CYLINDER: -1.25

## 2023-02-15 ASSESSMENT — REFRACTION_CURRENTRX
OD_OVR_VA: 20/
OD_SPHERE: +1.75
OS_SPHERE: +1.75
OS_OVR_VA: 20/

## 2023-02-15 ASSESSMENT — AXIALLENGTH_DERIVED
OS_AL: 22.9333
OS_AL: 23.1657
OD_AL: 23.0405

## 2023-02-15 ASSESSMENT — KERATOMETRY
METHOD_AUTO_MANUAL: MANUAL
OD_K2POWER_DIOPTERS: 46.87
OS_K2POWER_DIOPTERS: 46.25
OD_AXISANGLE_DEGREES: 45
OS_AXISANGLE_DEGREES: 135
OS_K1POWER_DIOPTERS: 45.50
OD_K1POWER_DIOPTERS: 44.00

## 2023-02-15 ASSESSMENT — VISUAL ACUITY
OS_BCVA: 20/25
OD_BCVA: 20/40

## 2023-02-15 ASSESSMENT — PACHYMETRY
OD_CT_CORRECTION: 1
OD_CT_UM: 531
OS_CT_UM: 538
OS_CT_CORRECTION: 1

## 2023-02-15 ASSESSMENT — SUPERFICIAL PUNCTATE KERATITIS (SPK)
OS_SPK: T 1+
OD_SPK: T 1+

## 2023-02-15 ASSESSMENT — TONOMETRY
OD_IOP_MMHG: 14
OS_IOP_MMHG: 14

## 2023-02-15 ASSESSMENT — SPHEQUIV_DERIVED
OS_SPHEQUIV: -0.5
OS_SPHEQUIV: -1.125
OD_SPHEQUIV: -0.375

## 2023-03-31 RX ORDER — SOLIFENACIN SUCCINATE 10 MG/1
10 TABLET, FILM COATED ORAL DAILY
COMMUNITY
Start: 2023-02-01

## 2023-03-31 RX ORDER — VALACYCLOVIR HYDROCHLORIDE 1 G/1
TABLET, FILM COATED ORAL
COMMUNITY
Start: 2022-11-05

## 2023-03-31 RX ORDER — TIZANIDINE 4 MG/1
1 TABLET ORAL EVERY 6 HOURS PRN
COMMUNITY
Start: 2023-01-22

## 2023-04-05 ENCOUNTER — CONSULT (OUTPATIENT)
Dept: PAIN MEDICINE | Facility: CLINIC | Age: 68
End: 2023-04-05

## 2023-04-05 VITALS
HEIGHT: 67 IN | WEIGHT: 211.2 LBS | BODY MASS INDEX: 33.15 KG/M2 | DIASTOLIC BLOOD PRESSURE: 80 MMHG | TEMPERATURE: 97.9 F | HEART RATE: 75 BPM | SYSTOLIC BLOOD PRESSURE: 128 MMHG | RESPIRATION RATE: 20 BRPM

## 2023-04-05 DIAGNOSIS — M47.816 LUMBAR SPONDYLOSIS: Primary | ICD-10-CM

## 2023-04-05 RX ORDER — TRIAMTERENE AND HYDROCHLOROTHIAZIDE 37.5; 25 MG/1; MG/1
TABLET ORAL
COMMUNITY
Start: 2023-04-04

## 2023-04-05 RX ORDER — METHYLPREDNISOLONE ACETATE 80 MG/ML
80 INJECTION, SUSPENSION INTRA-ARTICULAR; INTRALESIONAL; INTRAMUSCULAR; PARENTERAL; SOFT TISSUE ONCE
OUTPATIENT
Start: 2023-04-05 | End: 2023-04-05

## 2023-04-05 RX ORDER — CELECOXIB 200 MG/1
CAPSULE ORAL
COMMUNITY
Start: 2023-04-03

## 2023-04-05 RX ORDER — LIDOCAINE HYDROCHLORIDE 10 MG/ML
10 INJECTION, SOLUTION EPIDURAL; INFILTRATION; INTRACAUDAL; PERINEURAL ONCE
OUTPATIENT
Start: 2023-04-05 | End: 2023-04-05

## 2023-04-05 RX ORDER — BUPIVACAINE HCL/PF 2.5 MG/ML
10 VIAL (ML) INJECTION ONCE
OUTPATIENT
Start: 2023-04-05 | End: 2023-04-05

## 2023-04-05 RX ORDER — PROGESTERONE 200 MG/1
1 CAPSULE ORAL EVERY EVENING
COMMUNITY
Start: 2023-03-03

## 2023-04-05 RX ORDER — HYDROCODONE BITARTRATE AND ACETAMINOPHEN 7.5; 325 MG/1; MG/1
1 TABLET ORAL
COMMUNITY
Start: 2023-03-08

## 2023-04-05 NOTE — PROGRESS NOTES
Assessment  1  Lumbar spondylosis - Bilateral  -     Case request operating room: BLOCK MEDIAL BRANCH L3, L4, L5 #1; Standing  -     Case request operating room: BLOCK MEDIAL BRANCH L3, L4, L5 #1  -     Ambulatory referral to Physical Therapy; Future    Axial low back pain described primarily by arthritic features  Aching, nagging, indolent, stabbing, throbbing features in axial low back without radicular components  5/5 strength bilaterally, negative SLR  Positive facet loading maneuvers in lumbar spine elicited pain, positive tenderness to palpation over lumbar paraspinal muscles  Findings correlate with prominent lumbar facet arthropathy seen throughout axial low back on x-ray  Currently she is neurologically intact without gait instability, saddle anesthesia or bowel/bladder abnormality  Risks, benefits alternative to medial branch blocks and subsequent radiofrequency ablation if successful thoroughly discussed with patient  Handouts provided questions answered to patient satisfaction  Plan  -Bilateral L3, L4, L5 medial branch blocks #1; f/u 2 weeks post procedure  -script provided for formal physical therapy for lumbar facet arthropathy; Physician directed home exercise plan as per [facility] demonstrated and handouts provided that patient plans to participate with for 1 hour, twice a week for the next 6 weeks  There are risks associated with opioid medications, including dependence, addiction and tolerance  The patient understands and agrees to use these medications only as prescribed  Potential side effects of the medications include, but are not limited to, constipation, drowsiness, addiction, impaired judgment and risk of fatal overdose if not taken as prescribed  The patient was warned against driving while taking sedation medications or operating heavy machinery  The patient voiced understanding  Sharing medications is a felony   At this point in time, the patient is showing no signs of addiction, abuse, diversion or suicidal ideation  [place] Prescription Drug Monitoring Program report was reviewed and was appropriate      Complete risks and benefits including bleeding, infection, tissue reaction, nerve injury and allergic reaction were discussed  The approach was demonstrated using models and literature was provided  Verbal and written consent was obtained  My impressions and treatment recommendations were discussed in detail with the patient who verbalized understanding and had no further questions  Discharge instructions were provided  I personally saw and examined the patient and I agree with the above discussed plan of care  New Medications Ordered This Visit   Medications   • polyethylene glycol-propylene glycol (SYSTANE) 0 4-0 3 %     Sig: Apply to eye   • progesterone (PROMETRIUM) 200 MG capsule   • solifenacin (VESICARE) 10 MG tablet     Sig: Take 10 mg by mouth daily   • tiZANidine (ZANAFLEX) 4 mg tablet     Sig: Take 1 tablet by mouth every 6 (six) hours as needed   • valACYclovir (VALTREX) 1,000 mg tablet     Sig: TAKE 2 TABLETS BY MOUTH EVERY 12 HOURS AS DIRECTED   • celecoxib (CeleBREX) 200 mg capsule   • HYDROcodone-acetaminophen (NORCO) 7 5-325 mg per tablet     Sig: Take 1 tablet by mouth every 4-6 hours as needed   • Progesterone 200 MG CAPS     Sig: Take 1 capsule by mouth every evening   • triamterene-hydrochlorothiazide (MAXZIDE-25) 37 5-25 mg per tablet       History of Present Illness    Divya Steel is a 74 y o  female with pmhx o fprior cerebral aneurysm, XOL, obesity, presenting with chronic low back pain described primarily as arthritic in nature  She describes 8/10 low back pain that is worse in the mornings and worse at the end of the day  The pain is characterized by achy, nagging, indolent, crampy, stabbing pain in her axial low back    The patient describes that the pain is worse with standing for long periods of time on hard surfaces as well as with walking  The patient is a very active individual and feels as though this pain compromises his participation with independent activities of daily living  The pain can be debilitating at times and contribute to significant disability, compromising overall activity and independent activities of daily living  She has not yet tried PT for her pain  Medications the patient has tried in the past include nsaids, tylenol  She describes no radicular symptoms and has good strength  She denies any weakness numbness or paresthesias  The patient denies any bowel or bladder dysfunction, saddle anesthesia or gait instability as well  I have personally reviewed and/or updated the patient's past medical history, past surgical history, family history, social history, current medications, allergies, and vital signs today  Review of Systems   Constitutional: Positive for activity change  HENT: Negative  Eyes: Negative  Respiratory: Negative  Cardiovascular: Negative  Gastrointestinal: Negative  Endocrine: Negative  Genitourinary: Negative  Musculoskeletal: Positive for arthralgias, back pain and myalgias  Negative for gait problem  Skin: Negative  Allergic/Immunologic: Negative  Neurological: Negative for weakness and numbness  Hematological: Negative  Psychiatric/Behavioral: Negative  All other systems reviewed and are negative        Patient Active Problem List   Diagnosis   • Chest pain   • Hyperlipidemia   • History of cerebral aneurysm repair   • Tobacco abuse       Past Medical History:   Diagnosis Date   • Cerebral aneurysm    • Hyperlipidemia        Past Surgical History:   Procedure Laterality Date   • BREAST CYST ASPIRATION Right 09/02/2022    cyst that abscessed   • CARDIAC CATHETERIZATION      2005   • CEREBRAL ANEURYSM REPAIR  07/1997    at [facility]   • CHOLECYSTECTOMY         Family History   Problem Relation Age of Onset   • No Known Problems Mother    • Prostate cancer Father    • No Known Problems Daughter    • No Known Problems Daughter    • No Known Problems Maternal Grandmother    • No Known Problems Maternal Grandfather    • No Known Problems Paternal Grandmother    • No Known Problems Paternal Grandfather    • No Known Problems Paternal Aunt        Social History     Occupational History   • Not on file   Tobacco Use   • Smoking status: Every Day     Packs/day: 0 25     Years: 25 00     Pack years: 6 25     Types: Cigarettes   • Smokeless tobacco: Never   Substance and Sexual Activity   • Alcohol use: Never   • Drug use: Never   • Sexual activity: Not on file       Current Outpatient Medications on File Prior to Visit   Medication Sig   • ALPRAZolam (XANAX) 1 mg tablet Pt states, takes for anxiety prn   • B Complex Vitamins (VITAMIN B COMPLEX) TABS Take 2 tablets by mouth daily    • celecoxib (CeleBREX) 200 mg capsule    • Cholecalciferol 25 MCG (1000 UT) capsule Take 1,000 Units by mouth daily    • FLUoxetine (PROzac) 40 MG capsule Take by mouth daily    • lansoprazole (PREVACID) 30 mg capsule Take by mouth daily    • Multiple Vitamins-Minerals (CENTRUM SILVER ULTRA WOMENS) TABS Take 1 tablet by mouth daily    • polyethylene glycol-propylene glycol (SYSTANE) 0 4-0 3 % Apply to eye   • progesterone (PROMETRIUM) 200 MG capsule    • ramelteon (ROZEREM) 8 mg tablet 8 mg daily at bedtime    • simvastatin (ZOCOR) 40 mg tablet Take 40 mg by mouth daily at bedtime    • tiZANidine (ZANAFLEX) 4 mg tablet Take 1 tablet by mouth every 6 (six) hours as needed   • traZODone (DESYREL) 50 mg tablet Take 50 mg by mouth daily at bedtime    • triamterene-hydrochlorothiazide (MAXZIDE-25) 37 5-25 mg per tablet    • valACYclovir (VALTREX) 1,000 mg tablet TAKE 2 TABLETS BY MOUTH EVERY 12 HOURS AS DIRECTED   • vitamin A 10,000 units capsule Take 10,000 Units by mouth daily    • vitamin E, tocopherol, 400 units capsule Take 400 Units by mouth daily    • aspirin 81 mg chewable tablet Chew 1 "tablet (81 mg total) daily (Patient not taking: Reported on 4/5/2023)   • calcium carbonate (OS-NATHANAEL) 600 MG tablet Take 1,200 mg by mouth daily at bedtime  (Patient not taking: Reported on 4/5/2023)   • halobetasol (ULTRAVATE) 0 05 % cream Apply 1 application topically daily at bedtime (Patient not taking: Reported on 4/5/2023)   • halobetasol (ULTRAVATE) 0 05 % cream Apply 1 application topically as needed (Patient not taking: Reported on 4/5/2023)   • HYDROcodone-acetaminophen (NORCO) 7 5-325 mg per tablet Take 1 tablet by mouth every 4-6 hours as needed (Patient not taking: Reported on 4/5/2023)   • Magnesium 100 MG CAPS Take 1 capsule by mouth 2 (two) times a day  (Patient not taking: Reported on 4/5/2023)   • Mirabegron ER 50 MG TB24 Take 1 tablet by mouth daily  (Patient not taking: Reported on 4/5/2023)   • Omega-3 Fatty Acids (OMEGA-3 FISH OIL) 1000 MG CAPS Take 1 capsule by mouth daily  (Patient not taking: Reported on 4/5/2023)   • Progesterone 200 MG CAPS Take 1 capsule by mouth every evening (Patient not taking: Reported on 4/5/2023)   • solifenacin (VESICARE) 10 MG tablet Take 10 mg by mouth daily (Patient not taking: Reported on 4/5/2023)     No current facility-administered medications on file prior to visit  Allergies   Allergen Reactions   • Tramadol Other (See Comments)     Does not ever want  Anxious, HA, Pain    • Zolpidem Other (See Comments)     Felt like speeding  • Guaifenesin Other (See Comments)     \"speeding\", palpitations  Heart races     • Phenylephrine-Acetaminophen Other (See Comments)     Heart races     Physical Exam    /80   Pulse 75   Temp 97 9 °F (36 6 °C)   Resp 20   Ht 5' 7\" (1 702 m)   Wt 95 8 kg (211 lb 3 2 oz)   BMI 33 08 kg/m²     Constitutional: normal, well developed, well nourished, alert, in no distress and non-toxic and no overt pain behavior   and obese  Eyes: anicteric  HEENT: grossly intact  Neck: supple, symmetric, trachea midline and no masses " "  Pulmonary:even and unlabored  Cardiovascular:No edema or pitting edema present  Skin:Normal without rashes or lesions and well hydrated  Psychiatric:Mood and affect appropriate  Neurologic:Cranial Nerves II-XII grossly intact Sensation grossly intact; no clonus negative morales's  Reflexes 2+ and brisk  SLR negative bilaterally  Musculoskeletal:normal gait  5/5 strength bilaterally with AROM in lower extremities  Normal heel toe and tip toe walking  Significant pain with lumbar facet loading bilaterally and with lateral spine rotation  ttp over lumbar paraspinal muscles  Negative alyson's test, negative gaenslen's negative SIJ loading bilaterally  Imaging    Vega Osman MD - 02/06/2023   Formatting of this note might be different from the original    EXAM   XR L SPINE AP AND LATERAL-   2/3/2023 3:18 pm     HISTORY   Provided clinical history: \"worsening low back pain Lumbar DDD\"     TECHNIQUE   Three views of the lumbar spine were obtained  COMPARISON   Radiographs dated March 11, 2014  FINDINGS   Five lumbar-type vertebral bodies   Multilevel intervertebral disc degeneration, moderate-to-severe at L1-2, L2-3, L4-5, and L5-S1   Multilevel facet osteoarthritis, greatest near the lumbosacral junction  No fracture or aggressive osseous lesion identified   Mild-to-moderate osteoarthritis of both hips   Surgical clips in the right upper abdomen  IMPRESSION   IMPRESSION   Degenerative findings as above          "

## 2023-04-05 NOTE — PATIENT INSTRUCTIONS
Core Strengthening Exercises   WHAT YOU NEED TO KNOW:   Your core includes the muscles of your lower back, hip, pelvis, and abdomen  Core strengthening exercises help heal and strengthen these muscles  This helps prevent another injury, and keeps your pelvis, spine, and hips in the correct position  DISCHARGE INSTRUCTIONS:   Call your doctor or physical therapist if:   You have sharp or worsening pain during exercise or at rest     You have questions or concerns about your shoulder exercises  Safety tips:  Talk to your healthcare provider before you start an exercise program  A physical therapist can teach you how to do core strengthening exercises safely  Do the exercises on a mat or firm surface  A firm surface will support your spine and prevent low back pain  Do not do these exercises on a bed  Move slowly and smoothly  Avoid fast or jerky motions  Stop if you feel pain  You may feel some discomfort at first, but you should not feel pain  Tell your provider or physical therapist if you have pain while you exercise  Regular exercise will help decrease your discomfort over time  Breathe normally during core exercises  Do not hold your breath  This may cause an increase in blood pressure and prevent muscle strengthening  Your healthcare provider will tell you when to inhale and exhale during the exercise  Begin all of your exercises with abdominal bracing  Abdominal bracing helps warm up your core muscles  You can also practice abdominal bracing throughout the day  Lie on your back with your knees bent and feet flat on the floor  Place your arms in a relaxed position beside your body  Tighten your abdominal muscles  Pull your belly button in and up toward your spine  Hold for 5 seconds  Relax your muscles  Repeat 10 times  Core strengthening exercises: Your healthcare provider will tell you how often to do these exercises   The provider will also tell you how many repetitions of each exercise you should do  Hold each exercise for 5 seconds or as directed  As you get stronger, increase your hold to 10 to 15 seconds  You can do some of these exercises on a stability ball, or with a weight  Ask your healthcare provider how to use a stability ball or weight for these exercises:  Bridging:  Lie on your back with your knees bent and feet flat on the floor  Rest your arms at your side  Tighten your buttocks, and then lift your hips 1 inch off the floor  Hold for 5 seconds  When you can do this exercise without pain for 10 seconds, increase the distance you lift your hips  A good goal is to be able to lift your hips so that your shoulders, hips, and knees are in a straight line  Dead bug:  Lie on your back with your knees bent and feet flat on the floor  Place your arms in a relaxed position beside your body  Begin with abdominal bracing  Next, raise one leg, keeping your knee bent  Hold for 5 seconds  Repeat with the other leg  When you can do this exercise without pain for 10 to 15 seconds, you may raise one straight leg and hold  Repeat with the other leg  Quadruped:  Place your hands and knees on the floor  Keep your wrists directly below your shoulders and your knees directly below your hips  Pull your belly button in toward your spine  Do not flatten or arch your back  Tighten your abdominal muscles below your belly button  Hold for 5 seconds  When you can do this exercise without pain for 10 to 15 seconds, you may extend one arm and hold  Repeat on the other side  Side bridge exercises:      Standing side bridge:  Stand next to a wall and extend one arm toward the wall  Place your palm flat on the wall with your fingers pointing upward  Begin with abdominal bracing  Next, without moving your feet, slowly bend your arm to 90 degrees  Hold for 5 seconds  Repeat on the other side   When you can do this exercise without pain for 10 to 15 seconds, you may do the bent leg side bridge on the floor  Bent leg side bridge:  Lie on one side with your legs, hips, and shoulders in a straight line  Prop yourself up onto your forearm so your elbow is directly below your shoulder  Bend your knees back to 90 degrees  Begin with abdominal bracing  Next, lift your hips and balance yourself on your forearm and knees  Hold for 5 seconds  Repeat on the other side  When you can do this exercise without pain for 10 to 15 seconds, you may do the straight leg side bridge on the floor  Straight leg side bridge:  Lie on one side with your legs, hips, and shoulders in a straight line  Prop yourself up onto your forearm so your elbow is directly below your shoulder  Begin with abdominal bracing  Lift your hips off the floor and balance yourself on your forearm and the outside of your flexed foot  Do not let your ankle bend sideways  Hold for 5 seconds  Repeat on the other side  When you can do this exercise without pain for 10 to 15 seconds, ask your healthcare provider for more advanced exercises  Superman:  Lie on your stomach  Extend your arms forward on the floor  Tighten your abdominal muscles and lift your right hand and left leg off the floor  Hold this position  Slowly return to the starting position  Tighten your abdominal muscles and lift your left hand and right leg off the floor  Hold this position  Slowly return to the starting position  Clam:  Lie on your side with your knees bent  Put your bottom arm under your head to keep your neck in line  Put your top hand on your hip to keep your pelvis from moving  Put your heels together, and keep them together during this exercise  Slowly raise your top knee toward the ceiling  Then lower your leg so your knees are together  Repeat this exercise 10 times  Then switch sides and do the exercise 10 times with the other leg  Curl up:  Lie on your back with your knees bent and feet flat on the floor   Place your hands, palms down, underneath your lower back  Next, with your elbows on the floor, lift your shoulders and chest 2 to 3 inches off the floor  Keep your head in line with your shoulders  Hold this position  Slowly return to the starting position  Straight leg raises:  Lie on your back with one leg straight  Bend the other knee and place your foot flat on the floor  Tighten your abdominal muscles  Keep your leg straight and slowly lift it straight up 6 to 12 inches off the floor  Hold this position  Lower your leg slowly  Do as many repetitions as directed on this side  Repeat with the other leg  Plank:  Lie on your stomach  Bend your elbows and place your forearms flat on the floor  Lift your chest, stomach, and knees off the floor  Make sure your elbows are below your shoulders  Your body should be in a straight line  Do not let your hips or lower back sink to the ground  Squeeze your abdominal muscles together and hold for 15 seconds  To make this exercise harder, hold for 30 seconds or lift 1 leg at a time  Bicycles:  Lie on your back  Bend both knees and bring them toward your chest  Your calves should be parallel to the floor  Place the palms of your hands on the back of your head  Straighten your right leg and keep it lifted 2 inches off the floor  Raise your head and shoulders off the floor and twist towards your left  Keep your head and shoulders lifted  Bend your right knee while you straighten your left leg  Keep your left leg 2 inches off the floor  Twist your head and chest towards the left leg  Continue to straighten 1 leg at a time and twist        Follow up with your doctor or physical therapist as directed:  Write down your questions so you remember to ask them during your visits  © Copyright [employer] 2022 Information is for End User's use only and may not be sold, redistributed or otherwise used for commercial purposes  The above information is an  only   It is not intended as medical advice for individual conditions or treatments  Talk to your doctor, nurse or pharmacist before following any medical regimen to see if it is safe and effective for you

## 2023-04-06 ENCOUNTER — TELEPHONE (OUTPATIENT)
Dept: PAIN MEDICINE | Facility: CLINIC | Age: 68
End: 2023-04-06

## 2023-05-11 ENCOUNTER — HOSPITAL ENCOUNTER (OUTPATIENT)
Facility: HOSPITAL | Age: 68
Setting detail: OUTPATIENT SURGERY
Discharge: HOME | End: 2023-05-11
Attending: ANESTHESIOLOGY | Admitting: ANESTHESIOLOGY

## 2023-05-11 ENCOUNTER — APPOINTMENT (OUTPATIENT)
Dept: RADIOLOGY | Facility: HOSPITAL | Age: 68
End: 2023-05-11

## 2023-05-11 VITALS
TEMPERATURE: 97.8 F | HEART RATE: 66 BPM | HEIGHT: 67 IN | OXYGEN SATURATION: 95 % | BODY MASS INDEX: 32.33 KG/M2 | WEIGHT: 206 LBS | SYSTOLIC BLOOD PRESSURE: 128 MMHG | RESPIRATION RATE: 20 BRPM | DIASTOLIC BLOOD PRESSURE: 67 MMHG

## 2023-05-11 RX ORDER — LIDOCAINE HYDROCHLORIDE 10 MG/ML
10 INJECTION, SOLUTION EPIDURAL; INFILTRATION; INTRACAUDAL; PERINEURAL ONCE
Status: DISCONTINUED | OUTPATIENT
Start: 2023-05-11 | End: 2023-05-11 | Stop reason: HOSPADM

## 2023-05-11 RX ORDER — LIDOCAINE HYDROCHLORIDE 10 MG/ML
INJECTION, SOLUTION EPIDURAL; INFILTRATION; INTRACAUDAL; PERINEURAL AS NEEDED
Status: DISCONTINUED | OUTPATIENT
Start: 2023-05-11 | End: 2023-05-11 | Stop reason: HOSPADM

## 2023-05-11 RX ORDER — BUPIVACAINE HYDROCHLORIDE 2.5 MG/ML
INJECTION, SOLUTION EPIDURAL; INFILTRATION; INTRACAUDAL AS NEEDED
Status: DISCONTINUED | OUTPATIENT
Start: 2023-05-11 | End: 2023-05-11 | Stop reason: HOSPADM

## 2023-05-11 RX ORDER — BUPIVACAINE HCL/PF 2.5 MG/ML
10 VIAL (ML) INJECTION ONCE
Status: DISCONTINUED | OUTPATIENT
Start: 2023-05-11 | End: 2023-05-11 | Stop reason: HOSPADM

## 2023-05-11 RX ORDER — METHYLPREDNISOLONE ACETATE 80 MG/ML
80 INJECTION, SUSPENSION INTRA-ARTICULAR; INTRALESIONAL; INTRAMUSCULAR; PARENTERAL; SOFT TISSUE ONCE
Status: COMPLETED | OUTPATIENT
Start: 2023-05-11 | End: 2023-05-11

## 2023-05-11 NOTE — H&P
Assessment  1  Lumbar spondylosis - Bilateral  -     Case request operating room: BLOCK MEDIAL BRANCH L3, L4, L5 #1; Standing  -     Case request operating room: BLOCK MEDIAL BRANCH L3, L4, L5 #1  -     Ambulatory referral to Physical Therapy; Future    Axial low back pain described primarily by arthritic features  Aching, nagging, indolent, stabbing, throbbing features in axial low back without radicular components  5/5 strength bilaterally, negative SLR  Positive facet loading maneuvers in lumbar spine elicited pain, positive tenderness to palpation over lumbar paraspinal muscles  Findings correlate with prominent lumbar facet arthropathy seen throughout axial low back on x-ray  Currently she is neurologically intact without gait instability, saddle anesthesia or bowel/bladder abnormality  Risks, benefits alternative to medial branch blocks and subsequent radiofrequency ablation if successful thoroughly discussed with patient  Handouts provided questions answered to patient satisfaction  Plan  -Bilateral L3, L4, L5 medial branch blocks #1; f/u 2 weeks post procedure  -script provided for formal physical therapy for lumbar facet arthropathy; Physician directed home exercise plan as per [facility] demonstrated and handouts provided that patient plans to participate with for 1 hour, twice a week for the next 6 weeks  There are risks associated with opioid medications, including dependence, addiction and tolerance  The patient understands and agrees to use these medications only as prescribed  Potential side effects of the medications include, but are not limited to, constipation, drowsiness, addiction, impaired judgment and risk of fatal overdose if not taken as prescribed  The patient was warned against driving while taking sedation medications or operating heavy machinery  The patient voiced understanding  Sharing medications is a felony   At this point in time, the patient is showing no signs of addiction, abuse, diversion or suicidal ideation  [place] Prescription Drug Monitoring Program report was reviewed and was appropriate      Complete risks and benefits including bleeding, infection, tissue reaction, nerve injury and allergic reaction were discussed  The approach was demonstrated using models and literature was provided  Verbal and written consent was obtained  My impressions and treatment recommendations were discussed in detail with the patient who verbalized understanding and had no further questions  Discharge instructions were provided  I personally saw and examined the patient and I agree with the above discussed plan of care  New Medications Ordered This Visit   Medications   • bupivacaine (PF) (MARCAINE) 0 25 % injection 10 mL   • lidocaine (PF) (XYLOCAINE-MPF) 1 % injection 10 mL   • methylPREDNISolone acetate (DEPO-MEDROL) injection 80 mg       History of Present Illness    Janak Montero is a 74 y o  female with pmhx o fprior cerebral aneurysm, XOL, obesity, presenting with chronic low back pain described primarily as arthritic in nature  She describes 8/10 low back pain that is worse in the mornings and worse at the end of the day  The pain is characterized by achy, nagging, indolent, crampy, stabbing pain in her axial low back  The patient describes that the pain is worse with standing for long periods of time on hard surfaces as well as with walking  The patient is a very active individual and feels as though this pain compromises his participation with independent activities of daily living  The pain can be debilitating at times and contribute to significant disability, compromising overall activity and independent activities of daily living  She has not yet tried PT for her pain  Medications the patient has tried in the past include nsaids, tylenol  She describes no radicular symptoms and has good strength  She denies any weakness numbness or paresthesias  The patient denies any bowel or bladder dysfunction, saddle anesthesia or gait instability as well  I have personally reviewed and/or updated the patient's past medical history, past surgical history, family history, social history, current medications, allergies, and vital signs today  Review of Systems   Constitutional: Positive for activity change  HENT: Negative  Eyes: Negative  Respiratory: Negative  Cardiovascular: Negative  Gastrointestinal: Negative  Endocrine: Negative  Genitourinary: Negative  Musculoskeletal: Positive for arthralgias, back pain and myalgias  Negative for gait problem  Skin: Negative  Allergic/Immunologic: Negative  Neurological: Negative for weakness and numbness  Hematological: Negative  Psychiatric/Behavioral: Negative  All other systems reviewed and are negative        Patient Active Problem List   Diagnosis   • Chest pain   • Hyperlipidemia   • History of cerebral aneurysm repair   • Tobacco abuse   • Lumbar spondylosis       Past Medical History:   Diagnosis Date   • Cerebral aneurysm    • Hyperlipidemia        Past Surgical History:   Procedure Laterality Date   • BREAST CYST ASPIRATION Right 09/02/2022    cyst that abscessed   • CARDIAC CATHETERIZATION      2005   • CEREBRAL ANEURYSM REPAIR  07/1997    at [place]   • CHOLECYSTECTOMY         Family History   Problem Relation Age of Onset   • No Known Problems Mother    • Prostate cancer Father    • No Known Problems Daughter    • No Known Problems Daughter    • No Known Problems Maternal Grandmother    • No Known Problems Maternal Grandfather    • No Known Problems Paternal Grandmother    • No Known Problems Paternal Grandfather    • No Known Problems Paternal Aunt        Social History     Occupational History   • Not on file   Tobacco Use   • Smoking status: Every Day     Packs/day: 0 25     Years: 25 00     Pack years: 6 25     Types: Cigarettes   • Smokeless tobacco: Never   Substance and Sexual Activity   • Alcohol use: Never   • Drug use: Never   • Sexual activity: Not on file       No current facility-administered medications on file prior to encounter       Current Outpatient Medications on File Prior to Encounter   Medication Sig   • ALPRAZolam (XANAX) 1 mg tablet Pt states, takes for anxiety prn   • B Complex Vitamins (VITAMIN B COMPLEX) TABS Take 2 tablets by mouth daily    • celecoxib (CeleBREX) 200 mg capsule    • Cholecalciferol 25 MCG (1000 UT) capsule Take 1,000 Units by mouth daily    • FLUoxetine (PROzac) 40 MG capsule Take by mouth daily    • lansoprazole (PREVACID) 30 mg capsule Take by mouth daily    • Multiple Vitamins-Minerals (CENTRUM SILVER ULTRA WOMENS) TABS Take 1 tablet by mouth daily    • progesterone (PROMETRIUM) 200 MG capsule    • ramelteon (ROZEREM) 8 mg tablet 8 mg daily at bedtime    • simvastatin (ZOCOR) 40 mg tablet Take 40 mg by mouth daily at bedtime    • traZODone (DESYREL) 50 mg tablet Take 50 mg by mouth daily at bedtime    • vitamin A 10,000 units capsule Take 10,000 Units by mouth daily    • vitamin E, tocopherol, 400 units capsule Take 400 Units by mouth daily    • aspirin 81 mg chewable tablet Chew 1 tablet (81 mg total) daily (Patient not taking: Reported on 4/5/2023)   • calcium carbonate (OS-NATHANAEL) 600 MG tablet Take 1,200 mg by mouth daily at bedtime  (Patient not taking: Reported on 4/5/2023)   • halobetasol (ULTRAVATE) 0 05 % cream Apply 1 application topically daily at bedtime (Patient not taking: Reported on 4/5/2023)   • halobetasol (ULTRAVATE) 0 05 % cream Apply 1 application topically as needed (Patient not taking: Reported on 4/5/2023)   • HYDROcodone-acetaminophen (NORCO) 7 5-325 mg per tablet Take 1 tablet by mouth every 4-6 hours as needed (Patient not taking: Reported on 4/5/2023)   • Magnesium 100 MG CAPS Take 1 capsule by mouth 2 (two) times a day  (Patient not taking: Reported on 4/5/2023)   • Mirabegron ER 50 MG TB24 Take 1 tablet by "mouth daily  (Patient not taking: Reported on 4/5/2023)   • Omega-3 Fatty Acids (OMEGA-3 FISH OIL) 1000 MG CAPS Take 1 capsule by mouth daily  (Patient not taking: Reported on 4/5/2023)   • polyethylene glycol-propylene glycol (SYSTANE) 0 4-0 3 % Apply to eye   • Progesterone 200 MG CAPS Take 1 capsule by mouth every evening (Patient not taking: Reported on 4/5/2023)   • solifenacin (VESICARE) 10 MG tablet Take 10 mg by mouth daily (Patient not taking: Reported on 4/5/2023)   • tiZANidine (ZANAFLEX) 4 mg tablet Take 1 tablet by mouth every 6 (six) hours as needed   • triamterene-hydrochlorothiazide (MAXZIDE-25) 37 5-25 mg per tablet    • valACYclovir (VALTREX) 1,000 mg tablet TAKE 2 TABLETS BY MOUTH EVERY 12 HOURS AS DIRECTED       Allergies   Allergen Reactions   • Tramadol Other (See Comments)     Does not ever want  Anxious, HA, Pain    • Zolpidem Other (See Comments)     Felt like speeding  • Guaifenesin Other (See Comments)     \"speeding\", palpitations  Heart races     • Phenylephrine-Acetaminophen Other (See Comments)     Heart races     Physical Exam    /75   Pulse 75   Temp 98 1 °F (36 7 °C) (Oral)   Resp 18   Ht 5' 7\" (1 702 m)   Wt 93 4 kg (206 lb)   SpO2 97%   BMI 32 26 kg/m²     Constitutional: normal, well developed, well nourished, alert, in no distress and non-toxic and no overt pain behavior  and obese  Eyes: anicteric  HEENT: grossly intact  Neck: supple, symmetric, trachea midline and no masses   Pulmonary:even and unlabored  Cardiovascular:No edema or pitting edema present  Skin:Normal without rashes or lesions and well hydrated  Psychiatric:Mood and affect appropriate  Neurologic:Cranial Nerves II-XII grossly intact Sensation grossly intact; no clonus negative morales's  Reflexes 2+ and brisk  SLR negative bilaterally  Musculoskeletal:normal gait  5/5 strength bilaterally with AROM in lower extremities  Normal heel toe and tip toe walking   Significant pain with lumbar facet " "loading bilaterally and with lateral spine rotation  ttp over lumbar paraspinal muscles  Negative alyson's test, negative gaenslen's negative SIJ loading bilaterally  Imaging    Remberto Osman MD - 02/06/2023   Formatting of this note might be different from the original    EXAM   XR L SPINE AP AND LATERAL-   2/3/2023 3:18 pm     HISTORY   Provided clinical history: \"worsening low back pain Lumbar DDD\"     TECHNIQUE   Three views of the lumbar spine were obtained  COMPARISON   Radiographs dated March 11, 2014  FINDINGS   Five lumbar-type vertebral bodies   Multilevel intervertebral disc degeneration, moderate-to-severe at L1-2, L2-3, L4-5, and L5-S1   Multilevel facet osteoarthritis, greatest near the lumbosacral junction  No fracture or aggressive osseous lesion identified   Mild-to-moderate osteoarthritis of both hips   Surgical clips in the right upper abdomen  IMPRESSION   IMPRESSION   Degenerative findings as above          "

## 2023-05-11 NOTE — DISCHARGE INSTR - AVS FIRST PAGE
YOUR 2 WEEK FOLLOW UP HAS BEEN SCHEDULED; IF YOU WISH TO CHANGE THE FOLLOW UP, PLEASE CALL THE [facility] [place]: [phone]    MEDIAL BRANCH BLOCK DISCHARGE INSTRUCTIONS  ACTIVITY  Please do activities that will bring the normal pain that we are rating  For example, if vacuuming or walking increases the painm do that  Mykel twill give the most accurate response to the diary  You may shower, but no tub baths today, or applied heat  CARE OF THE INJECTION SITE  This area may be numb for several hours after the injection  Notify the [facility] if you have any of the following: redness, drainage, swelling or fever above 100°F     SPECIAL INSTRUCTIONS  Please return the MBB diary to our office by mail, fax, or drop it off  MEDICATIONS  Please do not take any break through or short acting pain medications for 8 hours after the block  Continue to take all routine medications  Our office may have instructed you to hold some medications  You may resume _______________________________________________  If you have any problems specifically related to your procedure, please call our office at [phone]  Problems not related to your procedure should be directed at your primary care physician  Lumbar Radiofrequency Ablation   WHAT YOU NEED TO KNOW:   What do I need to know about lumbar radiofrequency ablation? Lumbar radiofrequency ablation (RFA) is a procedure used to treat facet joint pain in your lower back  Facet joints are found at the back of each vertebra  A needle electrode is used to send electrical currents to the nerves in your facet joint  The electrical currents create heat that damages the nerve so it cannot send pain signals  How do I prepare for lumbar RFA? Your healthcare provider will talk to you about how to prepare for this procedure  You may be told to not to eat or drink anything after midnight on the day of your procedure   Your provider will tell you what medicines to take or not take on the day of your procedure  What will happen during lumbar RFA? You will lie on your stomach  You will be given local anesthesia to numb the area of your back where the needle electrode will be inserted  You may be given a sedative to help keep you relaxed  You may still feel pressure or pushing during the procedure, but you should not feel any pain  Your healthcare provider will use fluoroscopy (a type of x-ray) to guide the needle electrode to the nerves near your facet joint  Your healthcare provider may touch the affected nerve to make sure the needle electrode is in the right place  You will feel tingling or pressure when your provider does this  Your provider will then apply local anesthesia to the nerve to numb it  This will prevent you from feeling pain when your provider applies heat to the nerve  Your provider will then apply heat to the nerve using the needle electrode  Your provider may need to apply heat to more than one nerve  Your provider will remove the needle electrode and apply a bandage over the area  What are the risks of lumbar RFA? You may have pain, numbness, tingling, or burning in the area where the lumbar RFA was done  These normally go away within 6 weeks  The needle electrode may injure your spinal nerves  This may cause permanent leg weakness or nerve pain  CARE AGREEMENT:   You have the right to help plan your care  Learn about your health condition and how it may be treated  Discuss treatment options with your healthcare providers to decide what care you want to receive  You always have the right to refuse treatment  The above information is an  only  It is not intended as medical advice for individual conditions or treatments  Talk to your doctor, nurse or pharmacist before following any medical regimen to see if it is safe and effective for you    © Copyright Alber[employer]oy 2022 Information is for End User's use only and may not be sold, redistributed or otherwise used for commercial purposes

## 2023-05-11 NOTE — PROCEDURES
Pre-procedure Diagnosis: Lumbar Facet Arthropathy  Post-procedure Diagnosis: Lumbar Facet Arthropathy  Procedure Title(s):  [BILATERAL L3, L4, L5] medial branch nerve blocks [(DIAGNOSTIC)]  Attending Surgeon:   Rohith Proctor MD  Anesthesia:   Local     Indications: The patient is a 74y o  year-old female with a diagnosis of lumbar facet arthropathy  The patient's history and physical exam were reviewed  The risks, benefits and alternatives to the procedure were discussed, and all questions were answered to the patient's satisfaction  The patient agreed to proceed, and written informed consent was obtained  Procedure in Detail: The patient was brought into the procedure room and placed in the prone position on the fluoroscopy table  The area of the lumbar spine was prepped with chloraprep and then draped in a sterile manner  AP fluoroscopy was used to identify the [L3-L5] levels on the [LEFT] side  The C-arm was obliqued to visualize the junction of the superior articulate process and transverse process  The sacral ala was identified and marked  The skin in these identified areas was anesthetized with 1% lidocaine  A 22-gauge, 3½-inch spinal needle was advanced toward each of these points under fluoroscopic guidance  Once bone was contacted, negative aspiration was confirmed and [1-mL] of a [6mL]mixture of [5mL] [0 25% bupivicaine] and 1mL of 80mg/mL Depomedrol was injected at each level  (The same procedure was performed on the RIGHT side )    After the procedure was completed, the patient's back was cleaned and bandages were placed at the needle insertion sites  Disposition: The patient tolerated the procedure well, and there were no apparent complications  The patient was taken to the recovery area where written discharge instructions for the procedure were given  The patient was given a pain diary to determine if the patient's pain improves following the injection   Once the diary is returned we will consider next appropriate course of treatment      Postoperative pain relief [WAS] significant    Estimated Blood Loss: None  Specimens Obtained: N/A

## 2023-05-11 NOTE — OP NOTE
OPERATIVE REPORT  PATIENT NAME: Dipti Camacho    :  1955  MRN: [id number]  Pt Location:  GI ROOM 01    SURGERY DATE: 2023    Surgeon(s) and Role: Analilia Peters MD - Primary    Preop Diagnosis:  Lumbar spondylosis [M47 816]    Post-Op Diagnosis Codes:     * Lumbar spondylosis [M47 816]    Procedure(s):  Bilateral - BLOCK MEDIAL BRANCH L3  L4  L5 #1    Specimen(s):  * No specimens in log *    Estimated Blood Loss:   Minimal    Drains:  * No LDAs found *    Anesthesia Type:   Local    Operative Indications:  Lumbar spondylosis [M47 816]    Operative Findings:  Bilateral L3, L4, L5 medial branch nerve regions identified under fluoroscopic guidance  Complications:   None    Procedure and Technique:  Please see detailed procedure note    I was present for the entire procedure      Patient Disposition:  PACU     SIGNATURE: Christopher Reagan MD  DATE: May 11, 2023  TIME: 7:50 AM

## 2023-05-12 ENCOUNTER — TELEPHONE (OUTPATIENT)
Dept: PAIN MEDICINE | Facility: MEDICAL CENTER | Age: 68
End: 2023-05-12

## 2023-05-12 NOTE — TELEPHONE ENCOUNTER
Caller: patient    Doctor: Gilberto Nelson    Reason for call: needs to put in request for work to come to her next procedure, please call patient asap with next procedure date.     Call back#: [phone]

## 2023-05-19 ENCOUNTER — OFFICE VISIT (OUTPATIENT)
Dept: PAIN MEDICINE | Facility: CLINIC | Age: 68
End: 2023-05-19

## 2023-05-19 VITALS
SYSTOLIC BLOOD PRESSURE: 140 MMHG | RESPIRATION RATE: 20 BRPM | HEART RATE: 74 BPM | TEMPERATURE: 97.2 F | DIASTOLIC BLOOD PRESSURE: 86 MMHG | BODY MASS INDEX: 32.83 KG/M2 | WEIGHT: 209.2 LBS | HEIGHT: 67 IN

## 2023-05-19 DIAGNOSIS — M47.816 LUMBAR SPONDYLOSIS: Primary | ICD-10-CM

## 2023-05-19 RX ORDER — BUPIVACAINE HCL/PF 2.5 MG/ML
10 VIAL (ML) INJECTION ONCE
OUTPATIENT
Start: 2023-05-19 | End: 2023-05-19

## 2023-05-19 RX ORDER — METHYLPREDNISOLONE ACETATE 80 MG/ML
80 INJECTION, SUSPENSION INTRA-ARTICULAR; INTRALESIONAL; INTRAMUSCULAR; PARENTERAL; SOFT TISSUE ONCE
OUTPATIENT
Start: 2023-05-19 | End: 2023-05-19

## 2023-05-19 RX ORDER — LIDOCAINE HYDROCHLORIDE 10 MG/ML
10 INJECTION, SOLUTION EPIDURAL; INFILTRATION; INTRACAUDAL; PERINEURAL ONCE
OUTPATIENT
Start: 2023-05-19 | End: 2023-05-19

## 2023-05-19 NOTE — H&P (VIEW-ONLY)
Assessment  1  Lumbar spondylosis - Bilateral    Greater than 90% relief of pain with improved ability to participate with IADLs after Bilateral L3, L4, L5 medial branch blocks #1 for nearly 2 days  Previously reported the following symptomatology:     Axial low back pain described primarily by arthritic features  Aching, nagging, indolent, stabbing, throbbing features in axial low back without radicular components  5/5 strength bilaterally, negative SLR  Positive facet loading maneuvers in lumbar spine elicited pain, positive tenderness to palpation over lumbar paraspinal muscles  Findings correlate with prominent lumbar facet arthropathy seen throughout axial low back on x-ray  Currently she is neurologically intact without gait instability, saddle anesthesia or bowel/bladder abnormality  Risks, benefits alternative to medial branch blocks and subsequent radiofrequency ablation if successful thoroughly discussed with patient  Handouts provided questions answered to patient satisfaction  Plan  -Bilateral L3, L4, L5 medial branch blocks #2; f/u 2 weeks post procedure  -script provided for formal physical therapy for lumbar facet arthropathy; Physician directed home exercise plan as per [facility] demonstrated and handouts provided that patient plans to participate with for 1 hour, twice a week for the next 6 weeks  There are risks associated with opioid medications, including dependence, addiction and tolerance  The patient understands and agrees to use these medications only as prescribed  Potential side effects of the medications include, but are not limited to, constipation, drowsiness, addiction, impaired judgment and risk of fatal overdose if not taken as prescribed  The patient was warned against driving while taking sedation medications or operating heavy machinery  The patient voiced understanding  Sharing medications is a felony   At this point in time, the patient is showing no signs of addiction, abuse, diversion or suicidal ideation  [place] Prescription Drug Monitoring Program report was reviewed and was appropriate      Complete risks and benefits including bleeding, infection, tissue reaction, nerve injury and allergic reaction were discussed  The approach was demonstrated using models and literature was provided  Verbal and written consent was obtained  My impressions and treatment recommendations were discussed in detail with the patient who verbalized understanding and had no further questions  Discharge instructions were provided  I personally saw and examined the patient and I agree with the above discussed plan of care  No orders of the defined types were placed in this encounter  History of Present Illness    Greater than 90% relief of pain with improved ability to participate with IADLs after Bilateral L3, L4, L5 medial branch blocks #1 for nearly 2 days  Previously reported the following symptomatology:    Jessica Molina is a 74 y o  female with pmhx o fprior cerebral aneurysm, XOL, obesity, presenting with chronic low back pain described primarily as arthritic in nature  She describes 8/10 low back pain that is worse in the mornings and worse at the end of the day  The pain is characterized by achy, nagging, indolent, crampy, stabbing pain in her axial low back  The patient describes that the pain is worse with standing for long periods of time on hard surfaces as well as with walking  The patient is a very active individual and feels as though this pain compromises his participation with independent activities of daily living  The pain can be debilitating at times and contribute to significant disability, compromising overall activity and independent activities of daily living  She has not yet tried PT for her pain  Medications the patient has tried in the past include nsaids, tylenol  She describes no radicular symptoms and has good strength    She denies any weakness numbness or paresthesias  The patient denies any bowel or bladder dysfunction, saddle anesthesia or gait instability as well  I have personally reviewed and/or updated the patient's past medical history, past surgical history, family history, social history, current medications, allergies, and vital signs today  Review of Systems   Constitutional: Positive for activity change  HENT: Negative  Eyes: Negative  Respiratory: Negative  Cardiovascular: Negative  Gastrointestinal: Negative  Endocrine: Negative  Genitourinary: Negative  Musculoskeletal: Positive for arthralgias, back pain and myalgias  Negative for gait problem  Skin: Negative  Allergic/Immunologic: Negative  Neurological: Negative for weakness and numbness  Hematological: Negative  Psychiatric/Behavioral: Negative  All other systems reviewed and are negative        Patient Active Problem List   Diagnosis   • Chest pain   • Hyperlipidemia   • History of cerebral aneurysm repair   • Tobacco abuse   • Lumbar spondylosis       Past Medical History:   Diagnosis Date   • Cerebral aneurysm    • Hyperlipidemia        Past Surgical History:   Procedure Laterality Date   • BREAST CYST ASPIRATION Right 09/02/2022    cyst that abscessed   • CARDIAC CATHETERIZATION      2005   • CEREBRAL ANEURYSM REPAIR  07/1997    at [place]   • CHOLECYSTECTOMY     • NERVE BLOCK Bilateral 5/11/2023    Procedure: BLOCK MEDIAL BRANCH L3, L4, L5 #1;  Surgeon: Guillermina Calvert MD;  Location: [facility];  Service: Pain Management        Family History   Problem Relation Age of Onset   • No Known Problems Mother    • Prostate cancer Father    • No Known Problems Daughter    • No Known Problems Daughter    • No Known Problems Maternal Grandmother    • No Known Problems Maternal Grandfather    • No Known Problems Paternal Grandmother    • No Known Problems Paternal Grandfather    • No Known Problems Paternal Aunt        Social History     Occupational History   • Not on file   Tobacco Use   • Smoking status: Every Day     Packs/day: 0 25     Years: 25 00     Pack years: 6 25     Types: Cigarettes   • Smokeless tobacco: Never   Substance and Sexual Activity   • Alcohol use: Never   • Drug use: Never   • Sexual activity: Not on file       Current Outpatient Medications on File Prior to Visit   Medication Sig   • ALPRAZolam (XANAX) 1 mg tablet Pt states, takes for anxiety prn   • B Complex Vitamins (VITAMIN B COMPLEX) TABS Take 2 tablets by mouth daily    • Cholecalciferol 25 MCG (1000 UT) capsule Take 1,000 Units by mouth daily    • FLUoxetine (PROzac) 40 MG capsule Take by mouth daily    • lansoprazole (PREVACID) 30 mg capsule Take by mouth daily    • Multiple Vitamins-Minerals (CENTRUM SILVER ULTRA WOMENS) TABS Take 1 tablet by mouth daily    • polyethylene glycol-propylene glycol (SYSTANE) 0 4-0 3 % Apply to eye   • progesterone (PROMETRIUM) 200 MG capsule    • ramelteon (ROZEREM) 8 mg tablet 8 mg daily at bedtime    • simvastatin (ZOCOR) 40 mg tablet Take 40 mg by mouth daily at bedtime    • traZODone (DESYREL) 50 mg tablet Take 50 mg by mouth daily at bedtime    • valACYclovir (VALTREX) 1,000 mg tablet TAKE 2 TABLETS BY MOUTH EVERY 12 HOURS AS DIRECTED   • vitamin A 10,000 units capsule Take 10,000 Units by mouth daily    • vitamin E, tocopherol, 400 units capsule Take 400 Units by mouth daily    • aspirin 81 mg chewable tablet Chew 1 tablet (81 mg total) daily (Patient not taking: Reported on 4/5/2023)   • calcium carbonate (OS-NATHANAEL) 600 MG tablet Take 1,200 mg by mouth daily at bedtime  (Patient not taking: Reported on 4/5/2023)   • celecoxib (CeleBREX) 200 mg capsule  (Patient not taking: Reported on 5/19/2023)   • halobetasol (ULTRAVATE) 0 05 % cream Apply 1 application topically daily at bedtime (Patient not taking: Reported on 4/5/2023)   • halobetasol (ULTRAVATE) 0 05 % cream Apply 1 application topically as needed (Patient not "taking: Reported on 4/5/2023)   • HYDROcodone-acetaminophen (NORCO) 7 5-325 mg per tablet Take 1 tablet by mouth every 4-6 hours as needed (Patient not taking: Reported on 4/5/2023)   • Magnesium 100 MG CAPS Take 1 capsule by mouth 2 (two) times a day  (Patient not taking: Reported on 4/5/2023)   • Mirabegron ER 50 MG TB24 Take 1 tablet by mouth daily  (Patient not taking: Reported on 4/5/2023)   • Omega-3 Fatty Acids (OMEGA-3 FISH OIL) 1000 MG CAPS Take 1 capsule by mouth daily  (Patient not taking: Reported on 4/5/2023)   • Progesterone 200 MG CAPS Take 1 capsule by mouth every evening (Patient not taking: Reported on 4/5/2023)   • solifenacin (VESICARE) 10 MG tablet Take 10 mg by mouth daily (Patient not taking: Reported on 5/19/2023)   • tiZANidine (ZANAFLEX) 4 mg tablet Take 1 tablet by mouth every 6 (six) hours as needed (Patient not taking: Reported on 5/19/2023)   • triamterene-hydrochlorothiazide (MAXZIDE-25) 37 5-25 mg per tablet  (Patient not taking: Reported on 5/19/2023)     No current facility-administered medications on file prior to visit  Allergies   Allergen Reactions   • Tramadol Other (See Comments)     Does not ever want  Anxious, HA, Pain    • Zolpidem Other (See Comments)     Felt like speeding  • Guaifenesin Other (See Comments)     \"speeding\", palpitations  Heart races     • Phenylephrine-Acetaminophen Other (See Comments)     Heart races     Physical Exam    /86   Pulse 74   Temp (!) 97 2 °F (36 2 °C)   Resp 20   Ht 5' 7\" (1 702 m)   Wt 94 9 kg (209 lb 3 2 oz)   BMI 32 77 kg/m²     Constitutional: normal, well developed, well nourished, alert, in no distress and non-toxic and no overt pain behavior   and obese  Eyes: anicteric  HEENT: grossly intact  Neck: supple, symmetric, trachea midline and no masses   Pulmonary:even and unlabored  Cardiovascular:No edema or pitting edema present  Skin:Normal without rashes or lesions and well hydrated  Psychiatric:Mood and affect " "appropriate  Neurologic:Cranial Nerves II-XII grossly intact Sensation grossly intact; no clonus negative morales's  Reflexes 2+ and brisk  SLR negative bilaterally  Musculoskeletal:normal gait  5/5 strength bilaterally with AROM in lower extremities  Normal heel toe and tip toe walking  Significant pain with lumbar facet loading bilaterally and with lateral spine rotation  ttp over lumbar paraspinal muscles  Negative alyson's test, negative gaenslen's negative SIJ loading bilaterally  Imaging    Mickie Osman MD - 02/06/2023   Formatting of this note might be different from the original    EXAM   XR L SPINE AP AND LATERAL-   2/3/2023 3:18 pm     HISTORY   Provided clinical history: \"worsening low back pain Lumbar DDD\"     TECHNIQUE   Three views of the lumbar spine were obtained  COMPARISON   Radiographs dated March 11, 2014  FINDINGS   Five lumbar-type vertebral bodies   Multilevel intervertebral disc degeneration, moderate-to-severe at L1-2, L2-3, L4-5, and L5-S1   Multilevel facet osteoarthritis, greatest near the lumbosacral junction  No fracture or aggressive osseous lesion identified   Mild-to-moderate osteoarthritis of both hips   Surgical clips in the right upper abdomen  IMPRESSION   IMPRESSION   Degenerative findings as above          "

## 2023-05-19 NOTE — PROGRESS NOTES
Assessment  1  Lumbar spondylosis - Bilateral    Greater than 90% relief of pain with improved ability to participate with IADLs after Bilateral L3, L4, L5 medial branch blocks #1 for nearly 2 days  Previously reported the following symptomatology:     Axial low back pain described primarily by arthritic features  Aching, nagging, indolent, stabbing, throbbing features in axial low back without radicular components  5/5 strength bilaterally, negative SLR  Positive facet loading maneuvers in lumbar spine elicited pain, positive tenderness to palpation over lumbar paraspinal muscles  Findings correlate with prominent lumbar facet arthropathy seen throughout axial low back on x-ray  Currently she is neurologically intact without gait instability, saddle anesthesia or bowel/bladder abnormality  Risks, benefits alternative to medial branch blocks and subsequent radiofrequency ablation if successful thoroughly discussed with patient  Handouts provided questions answered to patient satisfaction  Plan  -Bilateral L3, L4, L5 medial branch blocks #2; f/u 2 weeks post procedure  -script provided for formal physical therapy for lumbar facet arthropathy; Physician directed home exercise plan as per AAOS demonstrated and handouts provided that patient plans to participate with for 1 hour, twice a week for the next 6 weeks  There are risks associated with opioid medications, including dependence, addiction and tolerance  The patient understands and agrees to use these medications only as prescribed  Potential side effects of the medications include, but are not limited to, constipation, drowsiness, addiction, impaired judgment and risk of fatal overdose if not taken as prescribed  The patient was warned against driving while taking sedation medications or operating heavy machinery  The patient voiced understanding  Sharing medications is a felony   At this point in time, the patient is showing no signs of addiction, abuse, diversion or suicidal ideation  [place] Prescription Drug Monitoring Program report was reviewed and was appropriate      Complete risks and benefits including bleeding, infection, tissue reaction, nerve injury and allergic reaction were discussed  The approach was demonstrated using models and literature was provided  Verbal and written consent was obtained  My impressions and treatment recommendations were discussed in detail with the patient who verbalized understanding and had no further questions  Discharge instructions were provided  I personally saw and examined the patient and I agree with the above discussed plan of care  No orders of the defined types were placed in this encounter  History of Present Illness    Greater than 90% relief of pain with improved ability to participate with IADLs after Bilateral L3, L4, L5 medial branch blocks #1 for nearly 2 days  Previously reported the following symptomatology:    Dee Dee Dempsey is a 74 y o  female with pmhx o fprior cerebral aneurysm, XOL, obesity, presenting with chronic low back pain described primarily as arthritic in nature  She describes 8/10 low back pain that is worse in the mornings and worse at the end of the day  The pain is characterized by achy, nagging, indolent, crampy, stabbing pain in her axial low back  The patient describes that the pain is worse with standing for long periods of time on hard surfaces as well as with walking  The patient is a very active individual and feels as though this pain compromises his participation with independent activities of daily living  The pain can be debilitating at times and contribute to significant disability, compromising overall activity and independent activities of daily living  She has not yet tried PT for her pain  Medications the patient has tried in the past include nsaids, tylenol  She describes no radicular symptoms and has good strength    She denies any weakness numbness or paresthesias  The patient denies any bowel or bladder dysfunction, saddle anesthesia or gait instability as well  I have personally reviewed and/or updated the patient's past medical history, past surgical history, family history, social history, current medications, allergies, and vital signs today  Review of Systems   Constitutional: Positive for activity change  HENT: Negative  Eyes: Negative  Respiratory: Negative  Cardiovascular: Negative  Gastrointestinal: Negative  Endocrine: Negative  Genitourinary: Negative  Musculoskeletal: Positive for arthralgias, back pain and myalgias  Negative for gait problem  Skin: Negative  Allergic/Immunologic: Negative  Neurological: Negative for weakness and numbness  Hematological: Negative  Psychiatric/Behavioral: Negative  All other systems reviewed and are negative        Patient Active Problem List   Diagnosis   • Chest pain   • Hyperlipidemia   • History of cerebral aneurysm repair   • Tobacco abuse   • Lumbar spondylosis       Past Medical History:   Diagnosis Date   • Cerebral aneurysm    • Hyperlipidemia        Past Surgical History:   Procedure Laterality Date   • BREAST CYST ASPIRATION Right 09/02/2022    cyst that abscessed   • CARDIAC CATHETERIZATION      2005   • CEREBRAL ANEURYSM REPAIR  07/1997    at [place]   • CHOLECYSTECTOMY     • NERVE BLOCK Bilateral 5/11/2023    Procedure: BLOCK MEDIAL BRANCH L3, L4, L5 #1;  Surgeon: Lisa Albert MD;  Location: [facility];  Service: Pain Management        Family History   Problem Relation Age of Onset   • No Known Problems Mother    • Prostate cancer Father    • No Known Problems Daughter    • No Known Problems Daughter    • No Known Problems Maternal Grandmother    • No Known Problems Maternal Grandfather    • No Known Problems Paternal Grandmother    • No Known Problems Paternal Grandfather    • No Known Problems Paternal Aunt        Social History     Occupational History   • Not on file   Tobacco Use   • Smoking status: Every Day     Packs/day: 0 25     Years: 25 00     Pack years: 6 25     Types: Cigarettes   • Smokeless tobacco: Never   Substance and Sexual Activity   • Alcohol use: Never   • Drug use: Never   • Sexual activity: Not on file       Current Outpatient Medications on File Prior to Visit   Medication Sig   • ALPRAZolam (XANAX) 1 mg tablet Pt states, takes for anxiety prn   • B Complex Vitamins (VITAMIN B COMPLEX) TABS Take 2 tablets by mouth daily    • Cholecalciferol 25 MCG (1000 UT) capsule Take 1,000 Units by mouth daily    • FLUoxetine (PROzac) 40 MG capsule Take by mouth daily    • lansoprazole (PREVACID) 30 mg capsule Take by mouth daily    • Multiple Vitamins-Minerals (CENTRUM SILVER ULTRA WOMENS) TABS Take 1 tablet by mouth daily    • polyethylene glycol-propylene glycol (SYSTANE) 0 4-0 3 % Apply to eye   • progesterone (PROMETRIUM) 200 MG capsule    • ramelteon (ROZEREM) 8 mg tablet 8 mg daily at bedtime    • simvastatin (ZOCOR) 40 mg tablet Take 40 mg by mouth daily at bedtime    • traZODone (DESYREL) 50 mg tablet Take 50 mg by mouth daily at bedtime    • valACYclovir (VALTREX) 1,000 mg tablet TAKE 2 TABLETS BY MOUTH EVERY 12 HOURS AS DIRECTED   • vitamin A 10,000 units capsule Take 10,000 Units by mouth daily    • vitamin E, tocopherol, 400 units capsule Take 400 Units by mouth daily    • aspirin 81 mg chewable tablet Chew 1 tablet (81 mg total) daily (Patient not taking: Reported on 4/5/2023)   • calcium carbonate (OS-NATHANAEL) 600 MG tablet Take 1,200 mg by mouth daily at bedtime  (Patient not taking: Reported on 4/5/2023)   • celecoxib (CeleBREX) 200 mg capsule  (Patient not taking: Reported on 5/19/2023)   • halobetasol (ULTRAVATE) 0 05 % cream Apply 1 application topically daily at bedtime (Patient not taking: Reported on 4/5/2023)   • halobetasol (ULTRAVATE) 0 05 % cream Apply 1 application topically as needed (Patient not "taking: Reported on 4/5/2023)   • HYDROcodone-acetaminophen (NORCO) 7 5-325 mg per tablet Take 1 tablet by mouth every 4-6 hours as needed (Patient not taking: Reported on 4/5/2023)   • Magnesium 100 MG CAPS Take 1 capsule by mouth 2 (two) times a day  (Patient not taking: Reported on 4/5/2023)   • Mirabegron ER 50 MG TB24 Take 1 tablet by mouth daily  (Patient not taking: Reported on 4/5/2023)   • Omega-3 Fatty Acids (OMEGA-3 FISH OIL) 1000 MG CAPS Take 1 capsule by mouth daily  (Patient not taking: Reported on 4/5/2023)   • Progesterone 200 MG CAPS Take 1 capsule by mouth every evening (Patient not taking: Reported on 4/5/2023)   • solifenacin (VESICARE) 10 MG tablet Take 10 mg by mouth daily (Patient not taking: Reported on 5/19/2023)   • tiZANidine (ZANAFLEX) 4 mg tablet Take 1 tablet by mouth every 6 (six) hours as needed (Patient not taking: Reported on 5/19/2023)   • triamterene-hydrochlorothiazide (MAXZIDE-25) 37 5-25 mg per tablet  (Patient not taking: Reported on 5/19/2023)     No current facility-administered medications on file prior to visit  Allergies   Allergen Reactions   • Tramadol Other (See Comments)     Does not ever want  Anxious, HA, Pain    • Zolpidem Other (See Comments)     Felt like speeding  • Guaifenesin Other (See Comments)     \"speeding\", palpitations  Heart races     • Phenylephrine-Acetaminophen Other (See Comments)     Heart races     Physical Exam    /86   Pulse 74   Temp (!) 97 2 °F (36 2 °C)   Resp 20   Ht 5' 7\" (1 702 m)   Wt 94 9 kg (209 lb 3 2 oz)   BMI 32 77 kg/m²     Constitutional: normal, well developed, well nourished, alert, in no distress and non-toxic and no overt pain behavior   and obese  Eyes: anicteric  HEENT: grossly intact  Neck: supple, symmetric, trachea midline and no masses   Pulmonary:even and unlabored  Cardiovascular:No edema or pitting edema present  Skin:Normal without rashes or lesions and well hydrated  Psychiatric:Mood and affect " "appropriate  Neurologic:Cranial Nerves II-XII grossly intact Sensation grossly intact; no clonus negative morales's  Reflexes 2+ and brisk  SLR negative bilaterally  Musculoskeletal:normal gait  5/5 strength bilaterally with AROM in lower extremities  Normal heel toe and tip toe walking  Significant pain with lumbar facet loading bilaterally and with lateral spine rotation  ttp over lumbar paraspinal muscles  Negative alyson's test, negative gaenslen's negative SIJ loading bilaterally  Imaging    Janet Osman MD - 02/06/2023   Formatting of this note might be different from the original    EXAM   XR L SPINE AP AND LATERAL-   2/3/2023 3:18 pm     HISTORY   Provided clinical history: \"worsening low back pain Lumbar DDD\"     TECHNIQUE   Three views of the lumbar spine were obtained  COMPARISON   Radiographs dated March 11, 2014  FINDINGS   Five lumbar-type vertebral bodies   Multilevel intervertebral disc degeneration, moderate-to-severe at L1-2, L2-3, L4-5, and L5-S1   Multilevel facet osteoarthritis, greatest near the lumbosacral junction  No fracture or aggressive osseous lesion identified   Mild-to-moderate osteoarthritis of both hips   Surgical clips in the right upper abdomen  IMPRESSION   IMPRESSION   Degenerative findings as above          "

## 2023-05-19 NOTE — PATIENT INSTRUCTIONS
Core Strengthening Exercises   WHAT YOU NEED TO KNOW:   Your core includes the muscles of your lower back, hip, pelvis, and abdomen  Core strengthening exercises help heal and strengthen these muscles  This helps prevent another injury, and keeps your pelvis, spine, and hips in the correct position  DISCHARGE INSTRUCTIONS:   Call your doctor or physical therapist if:   You have sharp or worsening pain during exercise or at rest     You have questions or concerns about your shoulder exercises  Safety tips:  Talk to your healthcare provider before you start an exercise program  A physical therapist can teach you how to do core strengthening exercises safely  Do the exercises on a mat or firm surface  A firm surface will support your spine and prevent low back pain  Do not do these exercises on a bed  Move slowly and smoothly  Avoid fast or jerky motions  Stop if you feel pain  You may feel some discomfort at first, but you should not feel pain  Tell your provider or physical therapist if you have pain while you exercise  Regular exercise will help decrease your discomfort over time  Breathe normally during core exercises  Do not hold your breath  This may cause an increase in blood pressure and prevent muscle strengthening  Your healthcare provider will tell you when to inhale and exhale during the exercise  Begin all of your exercises with abdominal bracing  Abdominal bracing helps warm up your core muscles  You can also practice abdominal bracing throughout the day  Lie on your back with your knees bent and feet flat on the floor  Place your arms in a relaxed position beside your body  Tighten your abdominal muscles  Pull your belly button in and up toward your spine  Hold for 5 seconds  Relax your muscles  Repeat 10 times  Core strengthening exercises: Your healthcare provider will tell you how often to do these exercises   The provider will also tell you how many repetitions of each exercise you should do  Hold each exercise for 5 seconds or as directed  As you get stronger, increase your hold to 10 to 15 seconds  You can do some of these exercises on a stability ball, or with a weight  Ask your healthcare provider how to use a stability ball or weight for these exercises:  Bridging:  Lie on your back with your knees bent and feet flat on the floor  Rest your arms at your side  Tighten your buttocks, and then lift your hips 1 inch off the floor  Hold for 5 seconds  When you can do this exercise without pain for 10 seconds, increase the distance you lift your hips  A good goal is to be able to lift your hips so that your shoulders, hips, and knees are in a straight line  Dead bug:  Lie on your back with your knees bent and feet flat on the floor  Place your arms in a relaxed position beside your body  Begin with abdominal bracing  Next, raise one leg, keeping your knee bent  Hold for 5 seconds  Repeat with the other leg  When you can do this exercise without pain for 10 to 15 seconds, you may raise one straight leg and hold  Repeat with the other leg  Quadruped:  Place your hands and knees on the floor  Keep your wrists directly below your shoulders and your knees directly below your hips  Pull your belly button in toward your spine  Do not flatten or arch your back  Tighten your abdominal muscles below your belly button  Hold for 5 seconds  When you can do this exercise without pain for 10 to 15 seconds, you may extend one arm and hold  Repeat on the other side  Side bridge exercises:      Standing side bridge:  Stand next to a wall and extend one arm toward the wall  Place your palm flat on the wall with your fingers pointing upward  Begin with abdominal bracing  Next, without moving your feet, slowly bend your arm to 90 degrees  Hold for 5 seconds  Repeat on the other side   When you can do this exercise without pain for 10 to 15 seconds, you may do the bent leg side bridge on the floor  Bent leg side bridge:  Lie on one side with your legs, hips, and shoulders in a straight line  Prop yourself up onto your forearm so your elbow is directly below your shoulder  Bend your knees back to 90 degrees  Begin with abdominal bracing  Next, lift your hips and balance yourself on your forearm and knees  Hold for 5 seconds  Repeat on the other side  When you can do this exercise without pain for 10 to 15 seconds, you may do the straight leg side bridge on the floor  Straight leg side bridge:  Lie on one side with your legs, hips, and shoulders in a straight line  Prop yourself up onto your forearm so your elbow is directly below your shoulder  Begin with abdominal bracing  Lift your hips off the floor and balance yourself on your forearm and the outside of your flexed foot  Do not let your ankle bend sideways  Hold for 5 seconds  Repeat on the other side  When you can do this exercise without pain for 10 to 15 seconds, ask your healthcare provider for more advanced exercises  Superman:  Lie on your stomach  Extend your arms forward on the floor  Tighten your abdominal muscles and lift your right hand and left leg off the floor  Hold this position  Slowly return to the starting position  Tighten your abdominal muscles and lift your left hand and right leg off the floor  Hold this position  Slowly return to the starting position  Clam:  Lie on your side with your knees bent  Put your bottom arm under your head to keep your neck in line  Put your top hand on your hip to keep your pelvis from moving  Put your heels together, and keep them together during this exercise  Slowly raise your top knee toward the ceiling  Then lower your leg so your knees are together  Repeat this exercise 10 times  Then switch sides and do the exercise 10 times with the other leg  Curl up:  Lie on your back with your knees bent and feet flat on the floor   Place your hands, palms down, underneath your lower back  Next, with your elbows on the floor, lift your shoulders and chest 2 to 3 inches off the floor  Keep your head in line with your shoulders  Hold this position  Slowly return to the starting position  Straight leg raises:  Lie on your back with one leg straight  Bend the other knee and place your foot flat on the floor  Tighten your abdominal muscles  Keep your leg straight and slowly lift it straight up 6 to 12 inches off the floor  Hold this position  Lower your leg slowly  Do as many repetitions as directed on this side  Repeat with the other leg  Plank:  Lie on your stomach  Bend your elbows and place your forearms flat on the floor  Lift your chest, stomach, and knees off the floor  Make sure your elbows are below your shoulders  Your body should be in a straight line  Do not let your hips or lower back sink to the ground  Squeeze your abdominal muscles together and hold for 15 seconds  To make this exercise harder, hold for 30 seconds or lift 1 leg at a time  Bicycles:  Lie on your back  Bend both knees and bring them toward your chest  Your calves should be parallel to the floor  Place the palms of your hands on the back of your head  Straighten your right leg and keep it lifted 2 inches off the floor  Raise your head and shoulders off the floor and twist towards your left  Keep your head and shoulders lifted  Bend your right knee while you straighten your left leg  Keep your left leg 2 inches off the floor  Twist your head and chest towards the left leg  Continue to straighten 1 leg at a time and twist        Follow up with your doctor or physical therapist as directed:  Write down your questions so you remember to ask them during your visits  © Copyright Mitzy Grajeda 2022 Information is for End User's use only and may not be sold, redistributed or otherwise used for commercial purposes  The above information is an  only   It is not intended as medical advice for individual conditions or treatments  Talk to your doctor, nurse or pharmacist before following any medical regimen to see if it is safe and effective for you

## 2023-05-26 NOTE — DISCHARGE INSTR - AVS FIRST PAGE
YOUR 2 WEEK FOLLOW UP HAS BEEN SCHEDULED; IF YOU WISH TO CHANGE THE FOLLOW UP, PLEASE CALL THE [facility] AT [place]: [phone]    MEDIAL BRANCH BLOCK DISCHARGE INSTRUCTIONS  ACTIVITY  Please do activities that will bring the normal pain that we are rating  For example, if vacuuming or walking increases the painm do that  Mykel twill give the most accurate response to the diary  You may shower, but no tub baths today, or applied heat  CARE OF THE INJECTION SITE  This area may be numb for several hours after the injection  Notify the [facility] if you have any of the following: redness, drainage, swelling or fever above 100°F     SPECIAL INSTRUCTIONS  Please return the MBB diary to our office by mail, fax, or drop it off  MEDICATIONS  Please do not take any break through or short acting pain medications for 8 hours after the block  Continue to take all routine medications  Our office may have instructed you to hold some medications  You may resume _______________________________________________  If you have any problems specifically related to your procedure, please call our office at [phone]  Problems not related to your procedure should be directed at your primary care physician  Lumbar Radiofrequency Ablation   WHAT YOU NEED TO KNOW:   What do I need to know about lumbar radiofrequency ablation? Lumbar radiofrequency ablation (RFA) is a procedure used to treat facet joint pain in your lower back  Facet joints are found at the back of each vertebra  A needle electrode is used to send electrical currents to the nerves in your facet joint  The electrical currents create heat that damages the nerve so it cannot send pain signals  How do I prepare for lumbar RFA? Your healthcare provider will talk to you about how to prepare for this procedure  You may be told to not to eat or drink anything after midnight on the day of your procedure   Your provider will tell you what medicines to take or not take on the day of your procedure  What will happen during lumbar RFA? You will lie on your stomach  You will be given local anesthesia to numb the area of your back where the needle electrode will be inserted  You may be given a sedative to help keep you relaxed  You may still feel pressure or pushing during the procedure, but you should not feel any pain  Your healthcare provider will use fluoroscopy (a type of x-ray) to guide the needle electrode to the nerves near your facet joint  Your healthcare provider may touch the affected nerve to make sure the needle electrode is in the right place  You will feel tingling or pressure when your provider does this  Your provider will then apply local anesthesia to the nerve to numb it  This will prevent you from feeling pain when your provider applies heat to the nerve  Your provider will then apply heat to the nerve using the needle electrode  Your provider may need to apply heat to more than one nerve  Your provider will remove the needle electrode and apply a bandage over the area  What are the risks of lumbar RFA? You may have pain, numbness, tingling, or burning in the area where the lumbar RFA was done  These normally go away within 6 weeks  The needle electrode may injure your spinal nerves  This may cause permanent leg weakness or nerve pain  CARE AGREEMENT:   You have the right to help plan your care  Learn about your health condition and how it may be treated  Discuss treatment options with your healthcare providers to decide what care you want to receive  You always have the right to refuse treatment  The above information is an  only  It is not intended as medical advice for individual conditions or treatments  Talk to your doctor, nurse or pharmacist before following any medical regimen to see if it is safe and effective for you    © Copyright Caterina Olivier 2022 Information is for End User's use only and may not be sold, redistributed or otherwise used for commercial purposes

## 2023-05-26 NOTE — DISCHARGE INSTRUCTIONS
YOUR 2 WEEK FOLLOW UP HAS BEEN SCHEDULED; IF YOU WISH TO CHANGE THE FOLLOW UP, PLEASE CALL THE [facility] AT [place]: [phone]  MEDIAL BRANCH BLOCK DISCHARGE INSTRUCTIONS  ACTIVITY  Please do activities that will bring the normal pain that we are rating  For example, if vacuuming or walking increases the painm do that  Mykel twill give the most accurate response to the diary  You may shower, but no tub baths today, or applied heat  CARE OF THE INJECTION SITE  This area may be numb for several hours after the injection  Notify the [facility] if you have any of the following: redness, drainage, swelling or fever above 100°F     SPECIAL INSTRUCTIONS  Please return the MBB diary to our office by mail, fax, or drop it off  MEDICATIONS  Please do not take any break through or short acting pain medications for 8 hours after the block  Continue to take all routine medications  Our office may have instructed you to hold some medications  You may resume _______________________________________________  If you have any problems specifically related to your procedure, please call our office at [phone]  Problems not related to your procedure should be directed at your primary care physician  Lumbar Radiofrequency Ablation   WHAT YOU NEED TO KNOW:   What do I need to know about lumbar radiofrequency ablation? Lumbar radiofrequency ablation (RFA) is a procedure used to treat facet joint pain in your lower back  Facet joints are found at the back of each vertebra  A needle electrode is used to send electrical currents to the nerves in your facet joint  The electrical currents create heat that damages the nerve so it cannot send pain signals  How do I prepare for lumbar RFA? Your healthcare provider will talk to you about how to prepare for this procedure  You may be told to not to eat or drink anything after midnight on the day of your procedure   Your provider will tell you what medicines to take or not take on the day of your procedure  What will happen during lumbar RFA? You will lie on your stomach  You will be given local anesthesia to numb the area of your back where the needle electrode will be inserted  You may be given a sedative to help keep you relaxed  You may still feel pressure or pushing during the procedure, but you should not feel any pain  Your healthcare provider will use fluoroscopy (a type of x-ray) to guide the needle electrode to the nerves near your facet joint  Your healthcare provider may touch the affected nerve to make sure the needle electrode is in the right place  You will feel tingling or pressure when your provider does this  Your provider will then apply local anesthesia to the nerve to numb it  This will prevent you from feeling pain when your provider applies heat to the nerve  Your provider will then apply heat to the nerve using the needle electrode  Your provider may need to apply heat to more than one nerve  Your provider will remove the needle electrode and apply a bandage over the area  What are the risks of lumbar RFA? You may have pain, numbness, tingling, or burning in the area where the lumbar RFA was done  These normally go away within 6 weeks  The needle electrode may injure your spinal nerves  This may cause permanent leg weakness or nerve pain  CARE AGREEMENT:   You have the right to help plan your care  Learn about your health condition and how it may be treated  Discuss treatment options with your healthcare providers to decide what care you want to receive  You always have the right to refuse treatment  The above information is an  only  It is not intended as medical advice for individual conditions or treatments  Talk to your doctor, nurse or pharmacist before following any medical regimen to see if it is safe and effective for you    © Copyright Carol Mathis 2022 Information is for End User's use only and may not be sold, redistributed or otherwise used for commercial purposes

## 2023-05-30 ENCOUNTER — HOSPITAL ENCOUNTER (OUTPATIENT)
Facility: HOSPITAL | Age: 68
Setting detail: OUTPATIENT SURGERY
Discharge: HOME | End: 2023-05-30
Attending: ANESTHESIOLOGY | Admitting: ANESTHESIOLOGY

## 2023-05-30 ENCOUNTER — APPOINTMENT (OUTPATIENT)
Dept: RADIOLOGY | Facility: HOSPITAL | Age: 68
End: 2023-05-30

## 2023-05-30 VITALS
OXYGEN SATURATION: 95 % | SYSTOLIC BLOOD PRESSURE: 140 MMHG | DIASTOLIC BLOOD PRESSURE: 81 MMHG | WEIGHT: 209 LBS | RESPIRATION RATE: 20 BRPM | TEMPERATURE: 98.6 F | HEART RATE: 75 BPM | BODY MASS INDEX: 32.8 KG/M2 | HEIGHT: 67 IN

## 2023-05-30 RX ORDER — METHYLPREDNISOLONE ACETATE 80 MG/ML
INJECTION, SUSPENSION INTRA-ARTICULAR; INTRALESIONAL; INTRAMUSCULAR; SOFT TISSUE AS NEEDED
Status: DISCONTINUED | OUTPATIENT
Start: 2023-05-30 | End: 2023-05-30 | Stop reason: HOSPADM

## 2023-05-30 RX ORDER — LIDOCAINE HYDROCHLORIDE 10 MG/ML
10 INJECTION, SOLUTION EPIDURAL; INFILTRATION; INTRACAUDAL; PERINEURAL ONCE
Status: COMPLETED | OUTPATIENT
Start: 2023-05-30 | End: 2023-05-30

## 2023-05-30 RX ORDER — METHYLPREDNISOLONE ACETATE 80 MG/ML
80 INJECTION, SUSPENSION INTRA-ARTICULAR; INTRALESIONAL; INTRAMUSCULAR; PARENTERAL; SOFT TISSUE ONCE
Status: DISCONTINUED | OUTPATIENT
Start: 2023-05-30 | End: 2023-05-30 | Stop reason: HOSPADM

## 2023-05-30 RX ORDER — BUPIVACAINE HCL/PF 2.5 MG/ML
10 VIAL (ML) INJECTION ONCE
Status: DISCONTINUED | OUTPATIENT
Start: 2023-05-30 | End: 2023-05-30 | Stop reason: HOSPADM

## 2023-05-30 RX ORDER — BUPIVACAINE HYDROCHLORIDE 2.5 MG/ML
INJECTION, SOLUTION EPIDURAL; INFILTRATION; INTRACAUDAL AS NEEDED
Status: DISCONTINUED | OUTPATIENT
Start: 2023-05-30 | End: 2023-05-30 | Stop reason: HOSPADM

## 2023-05-30 NOTE — PROCEDURES
Pre-procedure Diagnosis: Lumbar Facet Arthropathy  Post-procedure Diagnosis: Lumbar Facet Arthropathy  Procedure Title(s):  [BILATERAL L3, L4, L5] medial branch nerve blocks [(CONFIRMATORY)]  Attending Surgeon:   Evelyne Orellana MD  Anesthesia:   Local     Indications: The patient is a 74y o  year-old female with a diagnosis of lumbar facet arthropathy  The patient's history and physical exam were reviewed  The risks, benefits and alternatives to the procedure were discussed, and all questions were answered to the patient's satisfaction  The patient agreed to proceed, and written informed consent was obtained  Procedure in Detail: The patient was brought into the procedure room and placed in the prone position on the fluoroscopy table  The area of the lumbar spine was prepped with chloraprep and then draped in a sterile manner  AP fluoroscopy was used to identify the [L3-L5] levels on the [LEFT] side  The C-arm was obliqued to visualize the junction of the superior articulate process and transverse process  The sacral ala was identified and marked  The skin in these identified areas was anesthetized with 1% lidocaine  A 22-gauge, 3½-inch spinal needle was advanced toward each of these points under fluoroscopic guidance  Once bone was contacted, negative aspiration was confirmed and [1-mL] of a [6mL]mixture of [5mL] [0 25% bupivicaine] and 1mL of 80mg/mL Depomedrol was injected at each level  (The same procedure was performed on the RIGHT side )    After the procedure was completed, the patient's back was cleaned and bandages were placed at the needle insertion sites  Disposition: The patient tolerated the procedure well, and there were no apparent complications  The patient was taken to the recovery area where written discharge instructions for the procedure were given  The patient was given a pain diary to determine if the patient's pain improves following the injection   Once the diary is returned we will consider next appropriate course of treatment      Postoperative pain relief [WAS] significant    Estimated Blood Loss: None  Specimens Obtained: N/A

## 2023-05-30 NOTE — OP NOTE
OPERATIVE REPORT  PATIENT NAME: Avis Gloria    :  1955  MRN: [id number]  Pt Location:  GI ROOM 01    SURGERY DATE: 2023    Surgeon(s) and Role: Carlos Hager MD - Primary    Preop Diagnosis:  Lumbar spondylosis [M47 816]    Post-Op Diagnosis Codes:     * Lumbar spondylosis [M47 816]    Procedure(s):  Bilateral - BLOCK MEDIAL BRANCH L3  L4  L5 #2    Specimen(s):  * No specimens in log *    Estimated Blood Loss:   Minimal    Drains:  * No LDAs found *    Anesthesia Type:   Local    Operative Indications:  Lumbar spondylosis [M47 816]    Operative Findings:  Bilateral L3, L4, L5 medial branch nerve regions identified under fluoroscopic guidance  Complications:   None    Procedure and Technique:  Please see detailed procedure note    I was present for the entire procedure      Patient Disposition:  PACU     SIGNATURE: Raymond Boss MD  DATE: May 30, 2023  TIME: 8:00 AM

## 2023-05-30 NOTE — INTERVAL H&P NOTE
H&P reviewed  After examining the patient I find no changes in the patients condition since the H&P had been written      Vitals:    05/30/23 0733   BP: 131/79   Pulse: 82   Resp: 20   Temp: 98 6 °F (37 °C)   SpO2: 96%

## 2023-06-07 ENCOUNTER — OFFICE VISIT (OUTPATIENT)
Dept: PAIN MEDICINE | Facility: CLINIC | Age: 68
End: 2023-06-07
Payer: MEDICARE

## 2023-06-07 VITALS
HEART RATE: 70 BPM | DIASTOLIC BLOOD PRESSURE: 82 MMHG | TEMPERATURE: 97.4 F | RESPIRATION RATE: 20 BRPM | WEIGHT: 203.6 LBS | HEIGHT: 67 IN | BODY MASS INDEX: 31.96 KG/M2 | SYSTOLIC BLOOD PRESSURE: 118 MMHG

## 2023-06-07 DIAGNOSIS — M47.816 LUMBAR SPONDYLOSIS: Primary | ICD-10-CM

## 2023-06-07 PROCEDURE — 99214 OFFICE O/P EST MOD 30 MIN: CPT | Performed by: ANESTHESIOLOGY

## 2023-06-07 RX ORDER — LIDOCAINE HYDROCHLORIDE 10 MG/ML
10 INJECTION, SOLUTION EPIDURAL; INFILTRATION; INTRACAUDAL; PERINEURAL ONCE
OUTPATIENT
Start: 2023-06-07 | End: 2023-06-07

## 2023-06-07 RX ORDER — BUPIVACAINE HCL/PF 2.5 MG/ML
5 VIAL (ML) INJECTION ONCE
OUTPATIENT
Start: 2023-06-07 | End: 2023-06-07

## 2023-06-07 RX ORDER — METHYLPREDNISOLONE ACETATE 80 MG/ML
80 INJECTION, SUSPENSION INTRA-ARTICULAR; INTRALESIONAL; INTRAMUSCULAR; PARENTERAL; SOFT TISSUE ONCE
OUTPATIENT
Start: 2023-06-07 | End: 2023-06-07

## 2023-06-07 NOTE — PATIENT INSTRUCTIONS
Core Strengthening Exercises   WHAT YOU NEED TO KNOW:   Your core includes the muscles of your lower back, hip, pelvis, and abdomen  Core strengthening exercises help heal and strengthen these muscles  This helps prevent another injury, and keeps your pelvis, spine, and hips in the correct position  DISCHARGE INSTRUCTIONS:   Call your doctor or physical therapist if:   You have sharp or worsening pain during exercise or at rest     You have questions or concerns about your shoulder exercises  Safety tips:  Talk to your healthcare provider before you start an exercise program  A physical therapist can teach you how to do core strengthening exercises safely  Do the exercises on a mat or firm surface  A firm surface will support your spine and prevent low back pain  Do not do these exercises on a bed  Move slowly and smoothly  Avoid fast or jerky motions  Stop if you feel pain  You may feel some discomfort at first, but you should not feel pain  Tell your provider or physical therapist if you have pain while you exercise  Regular exercise will help decrease your discomfort over time  Breathe normally during core exercises  Do not hold your breath  This may cause an increase in blood pressure and prevent muscle strengthening  Your healthcare provider will tell you when to inhale and exhale during the exercise  Begin all of your exercises with abdominal bracing  Abdominal bracing helps warm up your core muscles  You can also practice abdominal bracing throughout the day  Lie on your back with your knees bent and feet flat on the floor  Place your arms in a relaxed position beside your body  Tighten your abdominal muscles  Pull your belly button in and up toward your spine  Hold for 5 seconds  Relax your muscles  Repeat 10 times  Core strengthening exercises: Your healthcare provider will tell you how often to do these exercises   The provider will also tell you how many repetitions of each exercise you should do  Hold each exercise for 5 seconds or as directed  As you get stronger, increase your hold to 10 to 15 seconds  You can do some of these exercises on a stability ball, or with a weight  Ask your healthcare provider how to use a stability ball or weight for these exercises:  Bridging:  Lie on your back with your knees bent and feet flat on the floor  Rest your arms at your side  Tighten your buttocks, and then lift your hips 1 inch off the floor  Hold for 5 seconds  When you can do this exercise without pain for 10 seconds, increase the distance you lift your hips  A good goal is to be able to lift your hips so that your shoulders, hips, and knees are in a straight line  Dead bug:  Lie on your back with your knees bent and feet flat on the floor  Place your arms in a relaxed position beside your body  Begin with abdominal bracing  Next, raise one leg, keeping your knee bent  Hold for 5 seconds  Repeat with the other leg  When you can do this exercise without pain for 10 to 15 seconds, you may raise one straight leg and hold  Repeat with the other leg  Quadruped:  Place your hands and knees on the floor  Keep your wrists directly below your shoulders and your knees directly below your hips  Pull your belly button in toward your spine  Do not flatten or arch your back  Tighten your abdominal muscles below your belly button  Hold for 5 seconds  When you can do this exercise without pain for 10 to 15 seconds, you may extend one arm and hold  Repeat on the other side  Side bridge exercises:      Standing side bridge:  Stand next to a wall and extend one arm toward the wall  Place your palm flat on the wall with your fingers pointing upward  Begin with abdominal bracing  Next, without moving your feet, slowly bend your arm to 90 degrees  Hold for 5 seconds  Repeat on the other side   When you can do this exercise without pain for 10 to 15 seconds, you may do the bent leg side bridge on the floor  Bent leg side bridge:  Lie on one side with your legs, hips, and shoulders in a straight line  Prop yourself up onto your forearm so your elbow is directly below your shoulder  Bend your knees back to 90 degrees  Begin with abdominal bracing  Next, lift your hips and balance yourself on your forearm and knees  Hold for 5 seconds  Repeat on the other side  When you can do this exercise without pain for 10 to 15 seconds, you may do the straight leg side bridge on the floor  Straight leg side bridge:  Lie on one side with your legs, hips, and shoulders in a straight line  Prop yourself up onto your forearm so your elbow is directly below your shoulder  Begin with abdominal bracing  Lift your hips off the floor and balance yourself on your forearm and the outside of your flexed foot  Do not let your ankle bend sideways  Hold for 5 seconds  Repeat on the other side  When you can do this exercise without pain for 10 to 15 seconds, ask your healthcare provider for more advanced exercises  Superman:  Lie on your stomach  Extend your arms forward on the floor  Tighten your abdominal muscles and lift your right hand and left leg off the floor  Hold this position  Slowly return to the starting position  Tighten your abdominal muscles and lift your left hand and right leg off the floor  Hold this position  Slowly return to the starting position  Clam:  Lie on your side with your knees bent  Put your bottom arm under your head to keep your neck in line  Put your top hand on your hip to keep your pelvis from moving  Put your heels together, and keep them together during this exercise  Slowly raise your top knee toward the ceiling  Then lower your leg so your knees are together  Repeat this exercise 10 times  Then switch sides and do the exercise 10 times with the other leg  Curl up:  Lie on your back with your knees bent and feet flat on the floor   Place your hands, palms down, underneath your lower back  Next, with your elbows on the floor, lift your shoulders and chest 2 to 3 inches off the floor  Keep your head in line with your shoulders  Hold this position  Slowly return to the starting position  Straight leg raises:  Lie on your back with one leg straight  Bend the other knee and place your foot flat on the floor  Tighten your abdominal muscles  Keep your leg straight and slowly lift it straight up 6 to 12 inches off the floor  Hold this position  Lower your leg slowly  Do as many repetitions as directed on this side  Repeat with the other leg  Plank:  Lie on your stomach  Bend your elbows and place your forearms flat on the floor  Lift your chest, stomach, and knees off the floor  Make sure your elbows are below your shoulders  Your body should be in a straight line  Do not let your hips or lower back sink to the ground  Squeeze your abdominal muscles together and hold for 15 seconds  To make this exercise harder, hold for 30 seconds or lift 1 leg at a time  Bicycles:  Lie on your back  Bend both knees and bring them toward your chest  Your calves should be parallel to the floor  Place the palms of your hands on the back of your head  Straighten your right leg and keep it lifted 2 inches off the floor  Raise your head and shoulders off the floor and twist towards your left  Keep your head and shoulders lifted  Bend your right knee while you straighten your left leg  Keep your left leg 2 inches off the floor  Twist your head and chest towards the left leg  Continue to straighten 1 leg at a time and twist        Follow up with your doctor or physical therapist as directed:  Write down your questions so you remember to ask them during your visits  © Copyright [employer] 2022 Information is for End User's use only and may not be sold, redistributed or otherwise used for commercial purposes  The above information is an  only   It is not intended as medical advice for individual conditions or treatments  Talk to your doctor, nurse or pharmacist before following any medical regimen to see if it is safe and effective for you

## 2023-06-07 NOTE — H&P (VIEW-ONLY)
Assessment  1  Lumbar spondylosis - Bilateral  -     Case request operating room: RHIZOTOMY LUMBAR Medial branch nerves L3, L4, L5; Standing  -     Case request operating room: RHIZOTOMY LUMBAR Medial branch nerves L3, L4, L5    Greater than 90% relief of pain with improved ability to participate with IADLs after Bilateral L3, L4, L5 medial branch blocks #1 and #2 for a number of days  Previously reported the following symptomatology:    Axial low back pain described primarily by arthritic features  Aching, nagging, indolent, stabbing, throbbing features in axial low back without radicular components  5/5 strength bilaterally, negative SLR  Positive facet loading maneuvers in lumbar spine elicited pain, positive tenderness to palpation over lumbar paraspinal muscles  Findings correlate with prominent lumbar facet arthropathy seen throughout axial low back on x-ray  Currently she is neurologically intact without gait instability, saddle anesthesia or bowel/bladder abnormality  Risks, benefits alternative to medial branch blocks and subsequent radiofrequency ablation if successful thoroughly discussed with patient  Handouts provided questions answered to patient satisfaction  Plan  -Bilateral L3, L4, L5 medial branch nerve radiofrequency ablation; f/u 2 weeks post procedure  -script provided for formal physical therapy for lumbar facet arthropathy; Physician directed home exercise plan as per [facility] demonstrated and handouts provided that patient plans to participate with for 1 hour, twice a week for the next 6 weeks  There are risks associated with opioid medications, including dependence, addiction and tolerance  The patient understands and agrees to use these medications only as prescribed  Potential side effects of the medications include, but are not limited to, constipation, drowsiness, addiction, impaired judgment and risk of fatal overdose if not taken as prescribed   The patient was warned against driving while taking sedation medications or operating heavy machinery  The patient voiced understanding  Sharing medications is a felony  At this point in time, the patient is showing no signs of addiction, abuse, diversion or suicidal ideation  [facility] Prescription Drug Monitoring Program report was reviewed and was appropriate      Complete risks and benefits including bleeding, infection, tissue reaction, nerve injury and allergic reaction were discussed  The approach was demonstrated using models and literature was provided  Verbal and written consent was obtained  My impressions and treatment recommendations were discussed in detail with the patient who verbalized understanding and had no further questions  Discharge instructions were provided  I personally saw and examined the patient and I agree with the above discussed plan of care  No orders of the defined types were placed in this encounter  History of Present Illness    Greater than 90% relief of pain with improved ability to participate with IADLs after Bilateral L3, L4, L5 medial branch blocks #1 and #2 for a number of days  Previously reported the following symptomatology:    Chidi Patterson is a 79 y o  female with pmhx o fprior cerebral aneurysm, XOL, obesity, presenting with chronic low back pain described primarily as arthritic in nature  She describes 8/10 low back pain that is worse in the mornings and worse at the end of the day  The pain is characterized by achy, nagging, indolent, crampy, stabbing pain in her axial low back  The patient describes that the pain is worse with standing for long periods of time on hard surfaces as well as with walking  The patient is a very active individual and feels as though this pain compromises his participation with independent activities of daily living   The pain can be debilitating at times and contribute to significant disability, compromising overall activity and independent activities of daily living  She has not yet tried PT for her pain  Medications the patient has tried in the past include nsaids, tylenol  She describes no radicular symptoms and has good strength  She denies any weakness numbness or paresthesias  The patient denies any bowel or bladder dysfunction, saddle anesthesia or gait instability as well  I have personally reviewed and/or updated the patient's past medical history, past surgical history, family history, social history, current medications, allergies, and vital signs today  Review of Systems   Constitutional: Positive for activity change  HENT: Negative  Eyes: Negative  Respiratory: Negative  Cardiovascular: Negative  Gastrointestinal: Negative  Endocrine: Negative  Genitourinary: Negative  Musculoskeletal: Positive for arthralgias, back pain and myalgias  Negative for gait problem  Skin: Negative  Allergic/Immunologic: Negative  Neurological: Negative for weakness and numbness  Hematological: Negative  Psychiatric/Behavioral: Negative  All other systems reviewed and are negative        Patient Active Problem List   Diagnosis   • Chest pain   • Hyperlipidemia   • History of cerebral aneurysm repair   • Tobacco abuse   • Lumbar spondylosis       Past Medical History:   Diagnosis Date   • Cerebral aneurysm    • Hyperlipidemia        Past Surgical History:   Procedure Laterality Date   • BREAST CYST ASPIRATION Right 09/02/2022    cyst that abscessed   • CARDIAC CATHETERIZATION      2005   • CEREBRAL ANEURYSM REPAIR  07/1997    at [place]   • CHOLECYSTECTOMY     • NERVE BLOCK Bilateral 5/11/2023    Procedure: BLOCK MEDIAL BRANCH L3, L4, L5 #1;  Surgeon: Aida Kumar MD;  Location: [facility];  Service: Pain Management    • NERVE BLOCK Bilateral 5/30/2023    Procedure: BLOCK MEDIAL BRANCH L3, L4, L5 #2;  Surgeon: Aida Kumar MD;  Location: [facility];  Service: Pain Management        Family History Problem Relation Age of Onset   • No Known Problems Mother    • Prostate cancer Father    • No Known Problems Daughter    • No Known Problems Daughter    • No Known Problems Maternal Grandmother    • No Known Problems Maternal Grandfather    • No Known Problems Paternal Grandmother    • No Known Problems Paternal Grandfather    • No Known Problems Paternal Aunt        Social History     Occupational History   • Not on file   Tobacco Use   • Smoking status: Every Day     Packs/day: 0 25     Years: 25 00     Total pack years: 6 25     Types: Cigarettes   • Smokeless tobacco: Never   Substance and Sexual Activity   • Alcohol use: Never   • Drug use: Never   • Sexual activity: Not on file       Current Outpatient Medications on File Prior to Visit   Medication Sig   • ALPRAZolam (XANAX) 1 mg tablet Pt states, takes for anxiety prn   • B Complex Vitamins (VITAMIN B COMPLEX) TABS Take 2 tablets by mouth daily    • celecoxib (CeleBREX) 200 mg capsule    • Cholecalciferol 25 MCG (1000 UT) capsule Take 1,000 Units by mouth daily    • FLUoxetine (PROzac) 40 MG capsule Take by mouth daily    • lansoprazole (PREVACID) 30 mg capsule Take by mouth daily    • Multiple Vitamins-Minerals (CENTRUM SILVER ULTRA WOMENS) TABS Take 1 tablet by mouth daily    • Progesterone 200 MG CAPS Take 1 capsule by mouth every evening   • ramelteon (ROZEREM) 8 mg tablet 8 mg daily at bedtime    • simvastatin (ZOCOR) 40 mg tablet Take 40 mg by mouth daily at bedtime    • traZODone (DESYREL) 50 mg tablet Take 50 mg by mouth daily at bedtime    • valACYclovir (VALTREX) 1,000 mg tablet TAKE 2 TABLETS BY MOUTH EVERY 12 HOURS AS DIRECTED   • vitamin A 10,000 units capsule Take 10,000 Units by mouth daily    • vitamin E, tocopherol, 400 units capsule Take 400 Units by mouth daily    • aspirin 81 mg chewable tablet Chew 1 tablet (81 mg total) daily (Patient not taking: Reported on 4/5/2023)   • calcium carbonate (OS-NATHANAEL) 600 MG tablet Take 1,200 mg by "mouth daily at bedtime  (Patient not taking: Reported on 4/5/2023)   • halobetasol (ULTRAVATE) 0 05 % cream Apply 1 application topically daily at bedtime (Patient not taking: Reported on 4/5/2023)   • halobetasol (ULTRAVATE) 0 05 % cream Apply 1 application topically as needed (Patient not taking: Reported on 4/5/2023)   • HYDROcodone-acetaminophen (NORCO) 7 5-325 mg per tablet Take 1 tablet by mouth every 4-6 hours as needed (Patient not taking: Reported on 4/5/2023)   • Magnesium 100 MG CAPS Take 1 capsule by mouth 2 (two) times a day  (Patient not taking: Reported on 4/5/2023)   • Mirabegron ER 50 MG TB24 Take 1 tablet by mouth daily  (Patient not taking: Reported on 4/5/2023)   • Omega-3 Fatty Acids (OMEGA-3 FISH OIL) 1000 MG CAPS Take 1 capsule by mouth daily  (Patient not taking: Reported on 4/5/2023)   • polyethylene glycol-propylene glycol (SYSTANE) 0 4-0 3 % Apply to eye   • progesterone (PROMETRIUM) 200 MG capsule    • solifenacin (VESICARE) 10 MG tablet Take 10 mg by mouth daily (Patient not taking: Reported on 5/19/2023)   • tiZANidine (ZANAFLEX) 4 mg tablet Take 1 tablet by mouth every 6 (six) hours as needed (Patient not taking: Reported on 5/19/2023)   • triamterene-hydrochlorothiazide (MAXZIDE-25) 37 5-25 mg per tablet  (Patient not taking: Reported on 5/19/2023)     No current facility-administered medications on file prior to visit  Allergies   Allergen Reactions   • Tramadol Other (See Comments)     Does not ever want  Anxious, HA, Pain    • Zolpidem Other (See Comments)     Felt like speeding      • Guaifenesin Other (See Comments)     \"speeding\", palpitations  Heart races     • Phenylephrine-Acetaminophen Other (See Comments)     Heart races     Physical Exam    /82   Pulse 70   Temp (!) 97 4 °F (36 3 °C)   Resp 20   Ht 5' 7\" (1 702 m)   Wt 92 4 kg (203 lb 9 6 oz)   BMI 31 89 kg/m²     Constitutional: normal, well developed, well nourished, alert, in no distress and non-toxic " "and no overt pain behavior  and obese  Eyes: anicteric  HEENT: grossly intact  Neck: supple, symmetric, trachea midline and no masses   Pulmonary:even and unlabored  Cardiovascular:No edema or pitting edema present  Skin:Normal without rashes or lesions and well hydrated  Psychiatric:Mood and affect appropriate  Neurologic:Cranial Nerves II-XII grossly intact Sensation grossly intact; no clonus negative morales's  Reflexes 2+ and brisk  SLR negative bilaterally  Musculoskeletal:normal gait  5/5 strength bilaterally with AROM in lower extremities  Normal heel toe and tip toe walking  Significant pain with lumbar facet loading bilaterally and with lateral spine rotation  ttp over lumbar paraspinal muscles  Negative alyson's test, negative gaenslen's negative SIJ loading bilaterally  Imaging    Radha Osman MD - 02/06/2023   Formatting of this note might be different from the original    EXAM   XR L SPINE AP AND LATERAL-   2/3/2023 3:18 pm     HISTORY   Provided clinical history: \"worsening low back pain Lumbar DDD\"     TECHNIQUE   Three views of the lumbar spine were obtained  COMPARISON   Radiographs dated March 11, 2014  FINDINGS   Five lumbar-type vertebral bodies   Multilevel intervertebral disc degeneration, moderate-to-severe at L1-2, L2-3, L4-5, and L5-S1   Multilevel facet osteoarthritis, greatest near the lumbosacral junction  No fracture or aggressive osseous lesion identified   Mild-to-moderate osteoarthritis of both hips   Surgical clips in the right upper abdomen  IMPRESSION   IMPRESSION   Degenerative findings as above          "

## 2023-06-07 NOTE — PROGRESS NOTES
Assessment  1  Lumbar spondylosis - Bilateral  -     Case request operating room: RHIZOTOMY LUMBAR Medial branch nerves L3, L4, L5; Standing  -     Case request operating room: RHIZOTOMY LUMBAR Medial branch nerves L3, L4, L5    Greater than 90% relief of pain with improved ability to participate with IADLs after Bilateral L3, L4, L5 medial branch blocks #1 and #2 for a number of days  Previously reported the following symptomatology:    Axial low back pain described primarily by arthritic features  Aching, nagging, indolent, stabbing, throbbing features in axial low back without radicular components  5/5 strength bilaterally, negative SLR  Positive facet loading maneuvers in lumbar spine elicited pain, positive tenderness to palpation over lumbar paraspinal muscles  Findings correlate with prominent lumbar facet arthropathy seen throughout axial low back on x-ray  Currently she is neurologically intact without gait instability, saddle anesthesia or bowel/bladder abnormality  Risks, benefits alternative to medial branch blocks and subsequent radiofrequency ablation if successful thoroughly discussed with patient  Handouts provided questions answered to patient satisfaction  Plan  -Bilateral L3, L4, L5 medial branch nerve radiofrequency ablation; f/u 2 weeks post procedure  -script provided for formal physical therapy for lumbar facet arthropathy; Physician directed home exercise plan as per [facility] demonstrated and handouts provided that patient plans to participate with for 1 hour, twice a week for the next 6 weeks  There are risks associated with opioid medications, including dependence, addiction and tolerance  The patient understands and agrees to use these medications only as prescribed  Potential side effects of the medications include, but are not limited to, constipation, drowsiness, addiction, impaired judgment and risk of fatal overdose if not taken as prescribed   The patient was warned against driving while taking sedation medications or operating heavy machinery  The patient voiced understanding  Sharing medications is a felony  At this point in time, the patient is showing no signs of addiction, abuse, diversion or suicidal ideation  [place] Prescription Drug Monitoring Program report was reviewed and was appropriate      Complete risks and benefits including bleeding, infection, tissue reaction, nerve injury and allergic reaction were discussed  The approach was demonstrated using models and literature was provided  Verbal and written consent was obtained  My impressions and treatment recommendations were discussed in detail with the patient who verbalized understanding and had no further questions  Discharge instructions were provided  I personally saw and examined the patient and I agree with the above discussed plan of care  No orders of the defined types were placed in this encounter  History of Present Illness    Greater than 90% relief of pain with improved ability to participate with IADLs after Bilateral L3, L4, L5 medial branch blocks #1 and #2 for a number of days  Previously reported the following symptomatology:    Matilde Kumari is a 79 y o  female with pmhx o fprior cerebral aneurysm, XOL, obesity, presenting with chronic low back pain described primarily as arthritic in nature  She describes 8/10 low back pain that is worse in the mornings and worse at the end of the day  The pain is characterized by achy, nagging, indolent, crampy, stabbing pain in her axial low back  The patient describes that the pain is worse with standing for long periods of time on hard surfaces as well as with walking  The patient is a very active individual and feels as though this pain compromises his participation with independent activities of daily living   The pain can be debilitating at times and contribute to significant disability, compromising overall activity and independent activities of daily living  She has not yet tried PT for her pain  Medications the patient has tried in the past include nsaids, tylenol  She describes no radicular symptoms and has good strength  She denies any weakness numbness or paresthesias  The patient denies any bowel or bladder dysfunction, saddle anesthesia or gait instability as well  I have personally reviewed and/or updated the patient's past medical history, past surgical history, family history, social history, current medications, allergies, and vital signs today  Review of Systems   Constitutional: Positive for activity change  HENT: Negative  Eyes: Negative  Respiratory: Negative  Cardiovascular: Negative  Gastrointestinal: Negative  Endocrine: Negative  Genitourinary: Negative  Musculoskeletal: Positive for arthralgias, back pain and myalgias  Negative for gait problem  Skin: Negative  Allergic/Immunologic: Negative  Neurological: Negative for weakness and numbness  Hematological: Negative  Psychiatric/Behavioral: Negative  All other systems reviewed and are negative        Patient Active Problem List   Diagnosis   • Chest pain   • Hyperlipidemia   • History of cerebral aneurysm repair   • Tobacco abuse   • Lumbar spondylosis       Past Medical History:   Diagnosis Date   • Cerebral aneurysm    • Hyperlipidemia        Past Surgical History:   Procedure Laterality Date   • BREAST CYST ASPIRATION Right 09/02/2022    cyst that abscessed   • CARDIAC CATHETERIZATION      2005   • CEREBRAL ANEURYSM REPAIR  07/1997    at [facility]   • CHOLECYSTECTOMY     • NERVE BLOCK Bilateral 5/11/2023    Procedure: BLOCK MEDIAL BRANCH L3, L4, L5 #1;  Surgeon: Ramesh Haque MD;  Location: [facility];  Service: Pain Management    • NERVE BLOCK Bilateral 5/30/2023    Procedure: BLOCK MEDIAL BRANCH L3, L4, L5 #2;  Surgeon: Ramesh Haque MD;  Location: [facility];  Service: Pain Management        Family History Problem Relation Age of Onset   • No Known Problems Mother    • Prostate cancer Father    • No Known Problems Daughter    • No Known Problems Daughter    • No Known Problems Maternal Grandmother    • No Known Problems Maternal Grandfather    • No Known Problems Paternal Grandmother    • No Known Problems Paternal Grandfather    • No Known Problems Paternal Aunt        Social History     Occupational History   • Not on file   Tobacco Use   • Smoking status: Every Day     Packs/day: 0 25     Years: 25 00     Total pack years: 6 25     Types: Cigarettes   • Smokeless tobacco: Never   Substance and Sexual Activity   • Alcohol use: Never   • Drug use: Never   • Sexual activity: Not on file       Current Outpatient Medications on File Prior to Visit   Medication Sig   • ALPRAZolam (XANAX) 1 mg tablet Pt states, takes for anxiety prn   • B Complex Vitamins (VITAMIN B COMPLEX) TABS Take 2 tablets by mouth daily    • celecoxib (CeleBREX) 200 mg capsule    • Cholecalciferol 25 MCG (1000 UT) capsule Take 1,000 Units by mouth daily    • FLUoxetine (PROzac) 40 MG capsule Take by mouth daily    • lansoprazole (PREVACID) 30 mg capsule Take by mouth daily    • Multiple Vitamins-Minerals (CENTRUM SILVER ULTRA WOMENS) TABS Take 1 tablet by mouth daily    • Progesterone 200 MG CAPS Take 1 capsule by mouth every evening   • ramelteon (ROZEREM) 8 mg tablet 8 mg daily at bedtime    • simvastatin (ZOCOR) 40 mg tablet Take 40 mg by mouth daily at bedtime    • traZODone (DESYREL) 50 mg tablet Take 50 mg by mouth daily at bedtime    • valACYclovir (VALTREX) 1,000 mg tablet TAKE 2 TABLETS BY MOUTH EVERY 12 HOURS AS DIRECTED   • vitamin A 10,000 units capsule Take 10,000 Units by mouth daily    • vitamin E, tocopherol, 400 units capsule Take 400 Units by mouth daily    • aspirin 81 mg chewable tablet Chew 1 tablet (81 mg total) daily (Patient not taking: Reported on 4/5/2023)   • calcium carbonate (OS-NATHANAEL) 600 MG tablet Take 1,200 mg by "mouth daily at bedtime  (Patient not taking: Reported on 4/5/2023)   • halobetasol (ULTRAVATE) 0 05 % cream Apply 1 application topically daily at bedtime (Patient not taking: Reported on 4/5/2023)   • halobetasol (ULTRAVATE) 0 05 % cream Apply 1 application topically as needed (Patient not taking: Reported on 4/5/2023)   • HYDROcodone-acetaminophen (NORCO) 7 5-325 mg per tablet Take 1 tablet by mouth every 4-6 hours as needed (Patient not taking: Reported on 4/5/2023)   • Magnesium 100 MG CAPS Take 1 capsule by mouth 2 (two) times a day  (Patient not taking: Reported on 4/5/2023)   • Mirabegron ER 50 MG TB24 Take 1 tablet by mouth daily  (Patient not taking: Reported on 4/5/2023)   • Omega-3 Fatty Acids (OMEGA-3 FISH OIL) 1000 MG CAPS Take 1 capsule by mouth daily  (Patient not taking: Reported on 4/5/2023)   • polyethylene glycol-propylene glycol (SYSTANE) 0 4-0 3 % Apply to eye   • progesterone (PROMETRIUM) 200 MG capsule    • solifenacin (VESICARE) 10 MG tablet Take 10 mg by mouth daily (Patient not taking: Reported on 5/19/2023)   • tiZANidine (ZANAFLEX) 4 mg tablet Take 1 tablet by mouth every 6 (six) hours as needed (Patient not taking: Reported on 5/19/2023)   • triamterene-hydrochlorothiazide (MAXZIDE-25) 37 5-25 mg per tablet  (Patient not taking: Reported on 5/19/2023)     No current facility-administered medications on file prior to visit  Allergies   Allergen Reactions   • Tramadol Other (See Comments)     Does not ever want  Anxious, HA, Pain    • Zolpidem Other (See Comments)     Felt like speeding      • Guaifenesin Other (See Comments)     \"speeding\", palpitations  Heart races     • Phenylephrine-Acetaminophen Other (See Comments)     Heart races     Physical Exam    /82   Pulse 70   Temp (!) 97 4 °F (36 3 °C)   Resp 20   Ht 5' 7\" (1 702 m)   Wt 92 4 kg (203 lb 9 6 oz)   BMI 31 89 kg/m²     Constitutional: normal, well developed, well nourished, alert, in no distress and non-toxic " "and no overt pain behavior  and obese  Eyes: anicteric  HEENT: grossly intact  Neck: supple, symmetric, trachea midline and no masses   Pulmonary:even and unlabored  Cardiovascular:No edema or pitting edema present  Skin:Normal without rashes or lesions and well hydrated  Psychiatric:Mood and affect appropriate  Neurologic:Cranial Nerves II-XII grossly intact Sensation grossly intact; no clonus negative morales's  Reflexes 2+ and brisk  SLR negative bilaterally  Musculoskeletal:normal gait  5/5 strength bilaterally with AROM in lower extremities  Normal heel toe and tip toe walking  Significant pain with lumbar facet loading bilaterally and with lateral spine rotation  ttp over lumbar paraspinal muscles  Negative alyson's test, negative gaenslen's negative SIJ loading bilaterally  Imaging    Kadie Osman MD - 02/06/2023   Formatting of this note might be different from the original    EXAM   XR L SPINE AP AND LATERAL-   2/3/2023 3:18 pm     HISTORY   Provided clinical history: \"worsening low back pain Lumbar DDD\"     TECHNIQUE   Three views of the lumbar spine were obtained  COMPARISON   Radiographs dated March 11, 2014  FINDINGS   Five lumbar-type vertebral bodies   Multilevel intervertebral disc degeneration, moderate-to-severe at L1-2, L2-3, L4-5, and L5-S1   Multilevel facet osteoarthritis, greatest near the lumbosacral junction  No fracture or aggressive osseous lesion identified   Mild-to-moderate osteoarthritis of both hips   Surgical clips in the right upper abdomen  IMPRESSION   IMPRESSION   Degenerative findings as above          "

## 2023-06-14 ENCOUNTER — TELEPHONE (OUTPATIENT)
Dept: PAIN MEDICINE | Facility: CLINIC | Age: 68
End: 2023-06-14

## 2023-06-14 NOTE — DISCHARGE INSTR - AVS FIRST PAGE
YOUR 2 WEEK FOLLOW UP HAS BEEN SCHEDULED; IF YOU WISH TO CHANGE THE FOLLOW UP, PLEASE CALL THE [facility] AT [facility]: [phone]    Lumbar Radiofrequency Ablation   WHAT YOU NEED TO KNOW:   Lumbar radiofrequency ablation (RFA) is a procedure used to treat facet joint pain in your lower back  Facet joints are found at the back of each vertebra  A needle electrode is used to send electrical currents to the nerves in your facet joint  The electrical currents create heat that damages the nerve so it cannot send pain signals  DISCHARGE INSTRUCTIONS:   Seek care immediately if:   You cannot move your leg  You cannot control your urine or bowel movements  You have severe pain in your lower back  Call your doctor if:   You have leg weakness  You develop new symptoms  You have questions or concerns about your condition or care  Medicines:   Pain medicine  may be given  Ask how to take this medicine safely  Take your medicine as directed  Contact your healthcare provider if you think your medicine is not helping or if you have side effects  Tell your provider if you are allergic to any medicine  Keep a list of the medicines, vitamins, and herbs you take  Include the amounts, and when and why you take them  Bring the list or the pill bottles to follow-up visits  Carry your medicine list with you in case of an emergency  Activity:  Do not drive a car or operate machinery within 24 hours after your procedure  Ask your healthcare provider about any other activities you should avoid  Follow up with your doctor as directed:  Write down your questions so you remember to ask them during your visits  © Copyright [employer]  Information is for End User's use only and may not be sold, redistributed or otherwise used for commercial purposes  The above information is an  only  It is not intended as medical advice for individual conditions or treatments   Talk to your doctor, nurse or pharmacist before following any medical regimen to see if it is safe and effective for you

## 2023-06-14 NOTE — TELEPHONE ENCOUNTER
Caller: pt    Doctor: alize    Reason for call: pt needed to know what time to be at her procedure tomorrow   I spoke with xenia and I let the pt know to be there at 7 am     Call back#: [phone]

## 2023-06-15 ENCOUNTER — APPOINTMENT (OUTPATIENT)
Dept: RADIOLOGY | Facility: HOSPITAL | Age: 68
End: 2023-06-15
Payer: MEDICARE

## 2023-06-15 ENCOUNTER — HOSPITAL ENCOUNTER (OUTPATIENT)
Facility: HOSPITAL | Age: 68
Setting detail: OUTPATIENT SURGERY
Discharge: HOME | End: 2023-06-15
Attending: ANESTHESIOLOGY | Admitting: ANESTHESIOLOGY
Payer: MEDICARE

## 2023-06-15 ENCOUNTER — TELEPHONE (OUTPATIENT)
Dept: RADIOLOGY | Facility: CLINIC | Age: 68
End: 2023-06-15

## 2023-06-15 VITALS
BODY MASS INDEX: 31.86 KG/M2 | HEIGHT: 67 IN | OXYGEN SATURATION: 95 % | SYSTOLIC BLOOD PRESSURE: 117 MMHG | TEMPERATURE: 97.3 F | RESPIRATION RATE: 20 BRPM | DIASTOLIC BLOOD PRESSURE: 64 MMHG | HEART RATE: 64 BPM | WEIGHT: 203 LBS

## 2023-06-15 PROCEDURE — NC001 PR NO CHARGE: Performed by: ANESTHESIOLOGY

## 2023-06-15 PROCEDURE — 64636 DESTROY L/S FACET JNT ADDL: CPT | Performed by: ANESTHESIOLOGY

## 2023-06-15 PROCEDURE — 64635 DESTROY LUMB/SAC FACET JNT: CPT | Performed by: ANESTHESIOLOGY

## 2023-06-15 RX ORDER — LIDOCAINE HYDROCHLORIDE 10 MG/ML
INJECTION, SOLUTION EPIDURAL; INFILTRATION; INTRACAUDAL; PERINEURAL AS NEEDED
Status: DISCONTINUED | OUTPATIENT
Start: 2023-06-15 | End: 2023-06-15 | Stop reason: HOSPADM

## 2023-06-15 RX ORDER — METHYLPREDNISOLONE ACETATE 80 MG/ML
INJECTION, SUSPENSION INTRA-ARTICULAR; INTRALESIONAL; INTRAMUSCULAR; SOFT TISSUE AS NEEDED
Status: DISCONTINUED | OUTPATIENT
Start: 2023-06-15 | End: 2023-06-15 | Stop reason: HOSPADM

## 2023-06-15 RX ORDER — LIDOCAINE HYDROCHLORIDE 20 MG/ML
INJECTION, SOLUTION EPIDURAL; INFILTRATION; INTRACAUDAL; PERINEURAL AS NEEDED
Status: DISCONTINUED | OUTPATIENT
Start: 2023-06-15 | End: 2023-06-15 | Stop reason: HOSPADM

## 2023-06-15 RX ORDER — BUPIVACAINE HYDROCHLORIDE 2.5 MG/ML
INJECTION, SOLUTION EPIDURAL; INFILTRATION; INTRACAUDAL AS NEEDED
Status: DISCONTINUED | OUTPATIENT
Start: 2023-06-15 | End: 2023-06-15 | Stop reason: HOSPADM

## 2023-06-15 NOTE — PROCEDURES
Pre-procedure Diagnosis: Lumbar facet arthropathy  Post-procedure Diagnosis: Lumbar facet arthropathy  Operation Title(s):  1  Radiofrequency ablation of [BILATERAL] L3, L4, L5 medial branch nerves      2  Intraoperative fluoroscopy  Attending Surgeon:   Reinaldo Landaverde MD  Anesthesia:   Local    Indications: The patient is a 79y o  year-old female with a diagnosis of lumbar facet arthropathy  The patient's history and physical exam were reviewed  The patient has previously undergone diagnostic and confirmatory lumbar medial branch blocks  Fluoroscopy is being used for the precise placement of the needles at the lumbar medial branch nerves  The risks, benefits and alternatives to the procedure were discussed, and all questions were answered to the patient's satisfaction  The patient agreed to proceed, and written informed consent was obtained  Procedure in Detail: The patient was brought into the procedure room and placed in the prone position on the fluoroscopy table  The low back and upper buttock were prepped with chloraprep times two and draped in a sterile manner  AP fluoroscopy was used to identify the lumbar levels on the [LEFT] side  The fluoroscope beam was then obliqued to better visualize the junction of the superior articular process and transverse process on the [LEFT] side and then tilted caudally about 25 degrees  An 18-gauge, 100mm length, 10mm curved active tip radiofrequency probe was advanced toward the targeted points until bone was contacted  Multiple fluoroscopic views were made to ensure placement of the needle tip at the appropriate location of the medial branch nerve  Motor stimulation was performed at 2 Hz and 1 2 volts generating a twitch in the paraspinal muscles with no motor activity in the lower extremities  Next, AP fluoroscopy was used to identify the L5-S1 level  The fluoroscope been was tilted cephalad to visualize the sacral ala   The fluoroscope beam was then tilted about 45 degrees from that point and the skin and subcutaneous tissues in these identified areas were anesthetized with 1% lidocaine  An 18-gauge, 100mm length, 10mm curved active tip radiofrequency probe was advanced toward the targeted points until bone was contacted  Motor stimulation was performed at 2 Hz and 1 2 volts generating a twitch in the paraspinal muscles with no motor activity in the lower extremities  0 5ml of 2% lidocaine was injected prior to lesioning, which was performed for 90 seconds at 80 degrees centigrade  The lesion was repeated once more after slight repositioning of the needles on an oblique view  Once the lesions were complete, 1ml of a solution consisting of 5mL 0 25% bupivacaine and 1 mL Depo-medrol (80mg/mL) was injected through each needle and then removed with a 1% lidocaine flush  The patient's back was cleaned, and bandages were placed at the needle insertion site  The same procedure was repeated at the lumbar levels on the [RIGHT] side    Disposition: The patient tolerated the procedure well and there were no apparent complications  Vital signs remained stable throughout the procedure  The patient was taken to the recovery area where written discharge instructions for the procedure were given      Estimated Blood Loss: None  Specimens Obtained: N/A

## 2023-06-15 NOTE — OP NOTE
OPERATIVE REPORT  PATIENT NAME: Khadijah Engle    :  1955  MRN: [id number]  Pt Location:  GI ROOM 01    SURGERY DATE: 6/15/2023    Surgeon(s) and Role: Estefania Muller MD - Primary    Preop Diagnosis:  Lumbar spondylosis [M47 816]    Post-Op Diagnosis Codes:     * Lumbar spondylosis [M47 816]    Procedure(s):  Bilateral - RHIZOTOMY LUMBAR Medial branch nerves L3  L4  L5    Specimen(s):  * No specimens in log *    Estimated Blood Loss:   Minimal    Drains:  * No LDAs found *    Anesthesia Type:   Local    Operative Indications:  Lumbar spondylosis [M47 816]    Operative Findings:  Bilateral L3, L4, L5 medial branch nerve regions identified under fluoroscopic guidance  Appropriate motor testing performed prior to radiofrequency lesioning of medial branch nerve regions    Complications:   None    Procedure and Technique:  Please see detailed procedure note    I was present for the entire procedure      Patient Disposition:  PACU     SIGNATURE: Lars Sparrow MD  DATE: Tereza 15, 2023  TIME: 7:58 AM

## 2023-06-15 NOTE — INTERVAL H&P NOTE
H&P reviewed  After examining the patient I find no changes in the patients condition since the H&P had been written      Vitals:    06/15/23 0722   BP: 126/74   Pulse: 70   Resp: 20   Temp: 97 5 °F (36 4 °C)   SpO2: 96%

## 2023-06-16 NOTE — TELEPHONE ENCOUNTER
Pt did well over night no s/s of infection or sunburn sensation  Aware it takes 2 weeks to notice pain relief and 4-6 weeks for full pain effect to be achieved  Aware to medicate as previous for discomfort and may use ice or heat  Confirmed next appt  7/14/2023 @ 11:45  Call if any questions or concerns prior to next appt  Pain down BL buttock, not severe, improving from this am  Overall does feel improved

## 2023-07-08 ENCOUNTER — HOSPITAL ENCOUNTER (EMERGENCY)
Facility: HOSPITAL | Age: 68
Discharge: HOME | End: 2023-07-08
Attending: EMERGENCY MEDICINE
Payer: COMMERCIAL

## 2023-07-08 ENCOUNTER — APPOINTMENT (EMERGENCY)
Dept: RADIOLOGY | Facility: HOSPITAL | Age: 68
End: 2023-07-08
Payer: COMMERCIAL

## 2023-07-08 VITALS
BODY MASS INDEX: 32.96 KG/M2 | OXYGEN SATURATION: 96 % | HEART RATE: 80 BPM | DIASTOLIC BLOOD PRESSURE: 79 MMHG | HEIGHT: 67 IN | TEMPERATURE: 97.2 F | WEIGHT: 210 LBS | SYSTOLIC BLOOD PRESSURE: 132 MMHG | RESPIRATION RATE: 16 BRPM

## 2023-07-08 DIAGNOSIS — S89.91XA INJURY OF RIGHT KNEE, INITIAL ENCOUNTER: Primary | ICD-10-CM

## 2023-07-08 PROCEDURE — 73564 X-RAY EXAM KNEE 4 OR MORE: CPT

## 2023-07-08 PROCEDURE — 99283 EMERGENCY DEPT VISIT LOW MDM: CPT

## 2023-07-08 RX ORDER — OXYCODONE HYDROCHLORIDE 5 MG/1
5 TABLET ORAL ONCE
Status: COMPLETED | OUTPATIENT
Start: 2023-07-08 | End: 2023-07-08

## 2023-07-08 RX ORDER — OXYCODONE HYDROCHLORIDE 5 MG/1
5 TABLET ORAL EVERY 6 HOURS PRN
Qty: 6 TABLET | Refills: 0 | Status: SHIPPED | OUTPATIENT
Start: 2023-07-08

## 2023-07-08 RX ADMIN — OXYCODONE HYDROCHLORIDE 5 MG: 5 TABLET ORAL at 11:53

## 2023-07-08 NOTE — DISCHARGE INSTRUCTIONS
Radiologist will review your X-Rays  Return immediately if worse or any new symptoms  Tylenol 1000 mg every 6 hours as needed  and/or  Advil 400 mg every 6 hours as needed  May take both together  Ice 20 minutes hourly as needed  Please contact your orthopedist in 2 days and arrange evaluation within the next 3-5 days  Maintain in immobilizer and use crutches

## 2023-07-08 NOTE — ED PROVIDER NOTES
History  Chief Complaint   Patient presents with   • Knee Pain     To the right with some swelling. Pt is worried if something happened with the replacement       80-year-old female accompanied by spouse describes acute right knee pain last evening approximately 5 PM carrying luggage, possibly twisted right knee. Not amenable to 2 Aleve. Did not sleep well secondary to discomfort. Notes has become subacutely swollen. No prodrome or fever. [place] the beach without any difficulties or injury. Bilateral knee placements in 2018 at [facility] orthopedist      History provided by:  Patient  Knee Pain  Location:  Knee  Knee location:  R knee  Pain details:     Quality:  Sharp    Radiates to:  Does not radiate    Severity:  Severe    Onset quality:  Sudden    Timing:  Constant    Progression:  Worsening  Chronicity:  New  Relieved by:  Nothing  Worsened by:  Extension, bearing weight and activity  Ineffective treatments:  NSAIDs  Associated symptoms: no fever        Prior to Admission Medications   Prescriptions Last Dose Informant Patient Reported? Taking?    ALPRAZolam (XANAX) 1 mg tablet   Yes No   Sig: Pt states, takes for anxiety prn   B Complex Vitamins (VITAMIN B COMPLEX) TABS  Self Yes No   Sig: Take 2 tablets by mouth daily    Cholecalciferol 25 MCG (1000 UT) capsule  Self Yes No   Sig: Take 1,000 Units by mouth daily    FLUoxetine (PROzac) 40 MG capsule  Self Yes No   Sig: Take by mouth daily    HYDROcodone-acetaminophen (NORCO) 7.5-325 mg per tablet   Yes No   Sig: Take 1 tablet by mouth every 4-6 hours as needed   Patient not taking: Reported on 4/5/2023   Magnesium 100 MG CAPS  Self Yes No   Sig: Take 1 capsule by mouth 2 (two) times a day    Patient not taking: Reported on 4/5/2023   Mirabegron ER 50 MG TB24  Self Yes No   Sig: Take 1 tablet by mouth daily    Patient not taking: Reported on 4/5/2023   Multiple Vitamins-Minerals (CENTRUM SILVER ULTRA WOMENS) TABS  Self Yes No   Sig: Take 1 tablet by mouth daily    Omega-3 Fatty Acids (OMEGA-3 FISH OIL) 1000 MG CAPS  Self Yes No   Sig: Take 1 capsule by mouth daily    Patient not taking: Reported on 2023   Progesterone 200 MG CAPS   Yes No   Sig: Take 1 capsule by mouth every evening   aspirin 81 mg chewable tablet   No No   Sig: Chew 1 tablet (81 mg total) daily   Patient not taking: Reported on 2023   calcium carbonate (OS-NATHANAEL) 600 MG tablet  Self Yes No   Sig: Take 1,200 mg by mouth daily at bedtime    Patient not taking: Reported on 2023   celecoxib (CeleBREX) 200 mg capsule   Yes No   halobetasol (ULTRAVATE) 0.05 % cream  Self Yes No   Sig: Apply 1 application topically daily at bedtime   Patient not taking: Reported on 2023   halobetasol (ULTRAVATE) 0.05 % cream  Self Yes No   Sig: Apply 1 application topically as needed   Patient not taking: Reported on 2023   lansoprazole (PREVACID) 30 mg capsule  Self Yes No   Sig: Take by mouth daily    polyethylene glycol-propylene glycol (SYSTANE) 0.4-0.3 %   Yes No   Sig: Apply to eye   progesterone (PROMETRIUM) 200 MG capsule   Yes No   ramelteon (ROZEREM) 8 mg tablet  Self Yes No   Si mg daily at bedtime    simvastatin (ZOCOR) 40 mg tablet  Self Yes No   Sig: Take 40 mg by mouth daily at bedtime    solifenacin (VESICARE) 10 MG tablet   Yes No   Sig: Take 10 mg by mouth daily   Patient not taking: Reported on 2023   tiZANidine (ZANAFLEX) 4 mg tablet   Yes No   Sig: Take 1 tablet by mouth every 6 (six) hours as needed   Patient not taking: Reported on 2023   traZODone (DESYREL) 50 mg tablet  Self Yes No   Sig: Take 50 mg by mouth daily at bedtime    triamterene-hydrochlorothiazide (MAXZIDE-25) 37.5-25 mg per tablet   Yes No   Patient not taking: Reported on 2023   valACYclovir (VALTREX) 1,000 mg tablet   Yes No   Sig: TAKE 2 TABLETS BY MOUTH EVERY 12 HOURS AS DIRECTED   vitamin A 10,000 units capsule  Self Yes No   Sig: Take 10,000 Units by mouth daily    vitamin E, tocopherol, 400 units capsule  Self Yes No   Sig: Take 400 Units by mouth daily       Facility-Administered Medications: None       Past Medical History:   Diagnosis Date   • Anxiety    • Cerebral aneurysm    • Hyperlipidemia        Past Surgical History:   Procedure Laterality Date   • BREAST CYST ASPIRATION Right 09/02/2022    cyst that abscessed   • CARDIAC CATHETERIZATION      2005   • CEREBRAL ANEURYSM REPAIR  07/1997    at [place]   • CHOLECYSTECTOMY     • NERVE BLOCK Bilateral 5/11/2023    Procedure: BLOCK MEDIAL BRANCH L3, L4, L5 #1;  Surgeon: Sheri Ferrari MD;  Location: [facility];  Service: Pain Management    • NERVE BLOCK Bilateral 5/30/2023    Procedure: BLOCK MEDIAL BRANCH L3, L4, L5 #2;  Surgeon: Sheri Ferrari MD;  Location: [facility];  Service: Pain Management    • RHIZOTOMY Bilateral 6/15/2023    Procedure: RHIZOTOMY LUMBAR Medial branch nerves L3, L4, L5;  Surgeon: Sheri Ferrari MD;  Location: [facility];  Service: Pain Management        Family History   Problem Relation Age of Onset   • No Known Problems Mother    • Prostate cancer Father    • No Known Problems Daughter    • No Known Problems Daughter    • No Known Problems Maternal Grandmother    • No Known Problems Maternal Grandfather    • No Known Problems Paternal Grandmother    • No Known Problems Paternal Grandfather    • No Known Problems Paternal Aunt      I have reviewed and agree with the history as documented. E-Cigarette/Vaping     E-Cigarette/Vaping Substances     Social History     Tobacco Use   • Smoking status: Every Day     Packs/day: 0.25     Years: 25.00     Total pack years: 6.25     Types: Cigarettes   • Smokeless tobacco: Never   Substance Use Topics   • Alcohol use: Never   • Drug use: Never       Review of Systems   Constitutional: Negative for fever. All other systems reviewed and are negative. Physical Exam  Physical Exam  Vitals and nursing note reviewed. Constitutional:       Appearance: Normal appearance. Comments: Pleasant, comfortable-appearing   HENT:      Head: Normocephalic and atraumatic. Right Ear: External ear normal.      Left Ear: External ear normal.      Nose: Nose normal.   Eyes:      Conjunctiva/sclera: Conjunctivae normal.   Pulmonary:      Effort: Pulmonary effort is normal.   Abdominal:      General: Abdomen is flat. Musculoskeletal:         General: No deformity. Cervical back: Neck supple. Comments: Right knee suprapatellar mild-moderate swelling, positioned in mild flexion, locally tender as well as medial joint, no laxity, no overlying skin changes or erythema, neurovascularly intact distally   Skin:     Comments: Good color   Neurological:      General: No focal deficit present. Mental Status: She is alert. Psychiatric:         Mood and Affect: Mood normal.         Vital Signs  ED Triage Vitals [07/08/23 1129]   Temperature Pulse Respirations Blood Pressure SpO2   (!) 97.2 °F (36.2 °C) 80 16 132/79 96 %      Temp Source Heart Rate Source Patient Position - Orthostatic VS BP Location FiO2 (%)   Temporal Monitor -- Left arm --      Pain Score       8           Vitals:    07/08/23 1129   BP: 132/79   Pulse: 80         Visual Acuity      ED Medications  Medications   oxyCODONE (ROXICODONE) IR tablet 5 mg (5 mg Oral Given 7/8/23 1153)       Diagnostic Studies  Results Reviewed     None                 XR knee 4+ vw right injury    (Results Pending)              Procedures  Procedures         ED Course  ED Course as of 07/08/23 1237   Sat Jul 08, 2023   1236 We discussed knee injury, x-ray findings, history of replacement with [place] area orthopedist and agreeable with close outpatient follow-up, copy of x-rays provided                               SBIRT 22yo+    Flowsheet Row Most Recent Value   Initial Alcohol Screen: US AUDIT-C     1. How often do you have a drink containing alcohol? 0 Filed at: 07/08/2023 1132   2.  How many drinks containing alcohol do you have on a typical day you are drinking? 0 Filed at: 07/08/2023 1132   3b. FEMALE Any Age, or MALE 65+: How often do you have 4 or more drinks on one occassion? 0 Filed at: 07/08/2023 1132   Audit-C Score 0 Filed at: 07/08/2023 1132   CLIFTON: How many times in the past year have you. .. Used an illegal drug or used a prescription medication for non-medical reasons? Never Filed at: 07/08/2023 1132                    Medical Decision Making  Amount and/or Complexity of Data Reviewed  Radiology: ordered and independent interpretation performed. Decision-making details documented in ED Course. ECG/medicine tests: ordered and independent interpretation performed. Decision-making details documented in ED Course. Risk  Prescription drug management. Disposition  Final diagnoses:   Injury of right knee, initial encounter     Time reflects when diagnosis was documented in both MDM as applicable and the Disposition within this note     Time User Action Codes Description Comment    7/8/2023 12:16 PM Cristiane Hung Add [Z36.15TQ] Injury of right knee, initial encounter       ED Disposition     ED Disposition   Discharge    Condition   Stable    Date/Time   Sat Jul 8, 2023 12:16 PM    Comment   Hadley discharge to home/self care. Follow-up Information    None         Patient's Medications   Discharge Prescriptions    OXYCODONE (ROXICODONE) 5 IMMEDIATE RELEASE TABLET    Take 1 tablet (5 mg total) by mouth every 6 (six) hours as needed for moderate pain for up to 6 doses Max Daily Amount: 20 mg       Start Date: 7/8/2023  End Date: --       Order Dose: 5 mg       Quantity: 6 tablet    Refills: 0       No discharge procedures on file.     PDMP Review     None          ED Provider  Electronically Signed by           Cristiane Hung DO  07/08/23 6548

## 2023-07-14 ENCOUNTER — OFFICE VISIT (OUTPATIENT)
Dept: PAIN MEDICINE | Facility: CLINIC | Age: 68
End: 2023-07-14
Payer: MEDICARE

## 2023-07-14 VITALS
TEMPERATURE: 97.7 F | BODY MASS INDEX: 32.99 KG/M2 | WEIGHT: 210.2 LBS | DIASTOLIC BLOOD PRESSURE: 86 MMHG | HEIGHT: 67 IN | RESPIRATION RATE: 20 BRPM | SYSTOLIC BLOOD PRESSURE: 130 MMHG

## 2023-07-14 DIAGNOSIS — M47.816 LUMBAR SPONDYLOSIS: Primary | ICD-10-CM

## 2023-07-14 DIAGNOSIS — M54.16 LUMBAR RADICULOPATHY: ICD-10-CM

## 2023-07-14 PROCEDURE — 99214 OFFICE O/P EST MOD 30 MIN: CPT | Performed by: ANESTHESIOLOGY

## 2023-07-14 NOTE — H&P (VIEW-ONLY)
Assessment  1. Lumbar spondylosis - Bilateral  -     MRI lumbar spine wo contrast; Future; Expected date: 07/14/2023  -     Ambulatory referral to Physical Therapy; Future    2. Lumbar radiculopathy  -     MRI lumbar spine wo contrast; Future; Expected date: 07/14/2023  -     Ambulatory referral to Physical Therapy; Future    Greater than 90% relief of pain with improved ability to participate with IADLs after Bilateral L3, L4, L5 medial branch blocks #1 and #2 and with subsequent radiofrequency lesioning of medial branch nerves performed 6/15/23; describes now proximal radicular features in left L4 and L5 dermatome. Counseled with regard to PT, obtaining MRI. Previously reported the following symptomatology:    Axial low back pain described primarily by arthritic features. Aching, nagging, indolent, stabbing, throbbing features in axial low back without radicular components. 5/5 strength bilaterally, negative SLR. Positive facet loading maneuvers in lumbar spine elicited pain, positive tenderness to palpation over lumbar paraspinal muscles. Findings correlate with prominent lumbar facet arthropathy seen throughout axial low back on x-ray. Currently she is neurologically intact without gait instability, saddle anesthesia or bowel/bladder abnormality. Risks, benefits alternative to medial branch blocks and subsequent radiofrequency ablation if successful thoroughly discussed with patient. Handouts provided questions answered to patient satisfaction. Plan  -MRI lumbar spine noncontrast; will f/u result with patient  -script provided for formal physical therapy for lumbar facet arthropathy; Physician directed home exercise plan as per [facility] demonstrated and handouts provided that patient plans to participate with for 1 hour, twice a week for the next 6 weeks. There are risks associated with opioid medications, including dependence, addiction and tolerance.  The patient understands and agrees to use these medications only as prescribed. Potential side effects of the medications include, but are not limited to, constipation, drowsiness, addiction, impaired judgment and risk of fatal overdose if not taken as prescribed. The patient was warned against driving while taking sedation medications or operating heavy machinery. The patient voiced understanding. Sharing medications is a felony. At this point in time, the patient is showing no signs of addiction, abuse, diversion or suicidal ideation. [place] Prescription Drug Monitoring Program report was reviewed and was appropriate      Complete risks and benefits including bleeding, infection, tissue reaction, nerve injury and allergic reaction were discussed. The approach was demonstrated using models and literature was provided. Verbal and written consent was obtained. My impressions and treatment recommendations were discussed in detail with the patient who verbalized understanding and had no further questions. Discharge instructions were provided. I personally saw and examined the patient and I agree with the above discussed plan of care. No orders of the defined types were placed in this encounter. History of Present Illness    Greater than 90% relief of pain with improved ability to participate with IADLs after Bilateral L3, L4, L5 medial branch blocks #1 and #2 and with subsequent radiofrequency lesioning of medial branch nerves performed 6/15/23; describes now proximal radicular features in left L4 and L5 dermatome. Previously reported the following symptomatology:    Jeannette Rendon is a 79 y.o. female with pmhx o fprior cerebral aneurysm, XOL, obesity, presenting with chronic low back pain described primarily as arthritic in nature. She describes 8/10 low back pain that is worse in the mornings and worse at the end of the day. The pain is characterized by achy, nagging, indolent, crampy, stabbing pain in her axial low back.   The patient describes that the pain is worse with standing for long periods of time on hard surfaces as well as with walking. The patient is a very active individual and feels as though this pain compromises his participation with independent activities of daily living. The pain can be debilitating at times and contribute to significant disability, compromising overall activity and independent activities of daily living. She has not yet tried PT for her pain. Medications the patient has tried in the past include nsaids, tylenol. She describes no radicular symptoms and has good strength. She denies any weakness numbness or paresthesias. The patient denies any bowel or bladder dysfunction, saddle anesthesia or gait instability as well. I have personally reviewed and/or updated the patient's past medical history, past surgical history, family history, social history, current medications, allergies, and vital signs today. Review of Systems   Constitutional: Positive for activity change. HENT: Negative. Eyes: Negative. Respiratory: Negative. Cardiovascular: Negative. Gastrointestinal: Negative. Endocrine: Negative. Genitourinary: Negative. Musculoskeletal: Positive for arthralgias, back pain and myalgias. Negative for gait problem. Skin: Negative. Allergic/Immunologic: Negative. Neurological: Negative for weakness and numbness. Hematological: Negative. Psychiatric/Behavioral: Negative. All other systems reviewed and are negative.       Patient Active Problem List   Diagnosis   • Chest pain   • Hyperlipidemia   • History of cerebral aneurysm repair   • Tobacco abuse   • Lumbar spondylosis       Past Medical History:   Diagnosis Date   • Anxiety    • Cerebral aneurysm    • Hyperlipidemia        Past Surgical History:   Procedure Laterality Date   • BREAST CYST ASPIRATION Right 09/02/2022    cyst that abscessed   • CARDIAC CATHETERIZATION      2005   • CEREBRAL ANEURYSM REPAIR  07/1997 at [place]   • CHOLECYSTECTOMY     • NERVE BLOCK Bilateral 5/11/2023    Procedure: BLOCK MEDIAL BRANCH L3, L4, L5 #1;  Surgeon: Eladia Villanueva MD;  Location: [facility];  Service: Pain Management    • NERVE BLOCK Bilateral 5/30/2023    Procedure: BLOCK MEDIAL BRANCH L3, L4, L5 #2;  Surgeon: Eladia Villanueva MD;  Location[facility];  Service: Pain Management    • RHIZOTOMY Bilateral 6/15/2023    Procedure: RHIZOTOMY LUMBAR Medial branch nerves L3, L4, L5;  Surgeon: Eladia Villanueva MD;  Location[facility];  Service: Pain Management        Family History   Problem Relation Age of Onset   • No Known Problems Mother    • Prostate cancer Father    • No Known Problems Daughter    • No Known Problems Daughter    • No Known Problems Maternal Grandmother    • No Known Problems Maternal Grandfather    • No Known Problems Paternal Grandmother    • No Known Problems Paternal Grandfather    • No Known Problems Paternal Aunt        Social History     Occupational History   • Not on file   Tobacco Use   • Smoking status: Every Day     Packs/day: 0.25     Years: 25.00     Total pack years: 6.25     Types: Cigarettes   • Smokeless tobacco: Never   Substance and Sexual Activity   • Alcohol use: Never   • Drug use: Never   • Sexual activity: Not on file       Current Outpatient Medications on File Prior to Visit   Medication Sig   • B Complex Vitamins (VITAMIN B COMPLEX) TABS Take 2 tablets by mouth daily    • celecoxib (CeleBREX) 200 mg capsule    • Cholecalciferol 25 MCG (1000 UT) capsule Take 1,000 Units by mouth daily    • FLUoxetine (PROzac) 40 MG capsule Take by mouth daily    • lansoprazole (PREVACID) 30 mg capsule Take by mouth daily    • Multiple Vitamins-Minerals (CENTRUM SILVER ULTRA WOMENS) TABS Take 1 tablet by mouth daily    • Progesterone 200 MG CAPS Take 1 capsule by mouth every evening   • ramelteon (ROZEREM) 8 mg tablet 8 mg daily at bedtime    • simvastatin (ZOCOR) 40 mg tablet Take 40 mg by mouth daily at bedtime • tiZANidine (ZANAFLEX) 4 mg tablet Take 1 tablet by mouth every 6 (six) hours as needed   • traZODone (DESYREL) 50 mg tablet Take 50 mg by mouth daily at bedtime    • valACYclovir (VALTREX) 1,000 mg tablet TAKE 2 TABLETS BY MOUTH EVERY 12 HOURS AS DIRECTED   • vitamin A 10,000 units capsule Take 10,000 Units by mouth daily    • vitamin E, tocopherol, 400 units capsule Take 400 Units by mouth daily    • ALPRAZolam (XANAX) 1 mg tablet Pt states, takes for anxiety prn   • aspirin 81 mg chewable tablet Chew 1 tablet (81 mg total) daily (Patient not taking: Reported on 4/5/2023)   • calcium carbonate (OS-NATHANAEL) 600 MG tablet Take 1,200 mg by mouth daily at bedtime  (Patient not taking: Reported on 4/5/2023)   • halobetasol (ULTRAVATE) 0.05 % cream Apply 1 application topically daily at bedtime (Patient not taking: Reported on 4/5/2023)   • halobetasol (ULTRAVATE) 0.05 % cream Apply 1 application topically as needed (Patient not taking: Reported on 4/5/2023)   • HYDROcodone-acetaminophen (NORCO) 7.5-325 mg per tablet Take 1 tablet by mouth every 4-6 hours as needed (Patient not taking: Reported on 4/5/2023)   • Magnesium 100 MG CAPS Take 1 capsule by mouth 2 (two) times a day  (Patient not taking: Reported on 4/5/2023)   • Mirabegron ER 50 MG TB24 Take 1 tablet by mouth daily  (Patient not taking: Reported on 4/5/2023)   • Omega-3 Fatty Acids (OMEGA-3 FISH OIL) 1000 MG CAPS Take 1 capsule by mouth daily  (Patient not taking: Reported on 4/5/2023)   • oxyCODONE (ROXICODONE) 5 immediate release tablet Take 1 tablet (5 mg total) by mouth every 6 (six) hours as needed for moderate pain for up to 6 doses Max Daily Amount: 20 mg   • polyethylene glycol-propylene glycol (SYSTANE) 0.4-0.3 % Apply to eye   • progesterone (PROMETRIUM) 200 MG capsule    • solifenacin (VESICARE) 10 MG tablet Take 10 mg by mouth daily (Patient not taking: Reported on 5/19/2023)   • triamterene-hydrochlorothiazide (MAXZIDE-25) 37.5-25 mg per tablet (Patient not taking: Reported on 5/19/2023)     No current facility-administered medications on file prior to visit. Allergies   Allergen Reactions   • Zolpidem Other (See Comments)     Felt like speeding. • Phenylephrine-Acetaminophen Other (See Comments)     Heart races     Physical Exam    /86   Temp 97.7 °F (36.5 °C)   Resp 20   Ht 5' 7" (1.702 m)   Wt 95.3 kg (210 lb 3.2 oz)   BMI 32.92 kg/m²     Constitutional: normal, well developed, well nourished, alert, in no distress and non-toxic and no overt pain behavior. and obese  Eyes: anicteric  HEENT: grossly intact  Neck: supple, symmetric, trachea midline and no masses   Pulmonary:even and unlabored  Cardiovascular:No edema or pitting edema present  Skin:Normal without rashes or lesions and well hydrated  Psychiatric:Mood and affect appropriate  Neurologic:Cranial Nerves II-XII grossly intact Sensation grossly intact; no clonus negative morales's. Reflexes 2+ and brisk. SLR negative bilaterally. Musculoskeletal:normal gait. 5/5 strength bilaterally with AROM in lower extremities. Normal heel toe and tip toe walking. Significant pain with lumbar facet loading bilaterally and with lateral spine rotation. ttp over lumbar paraspinal muscles. Negative alyson's test, negative gaenslen's negative SIJ loading bilaterally. Richa Moralez MD - 02/06/2023   Formatting of this note might be different from the original.   EXAM   XR L SPINE AP AND LATERAL-   2/3/2023 3:18 pm     HISTORY   Provided clinical history: "worsening low back pain Lumbar DDD"     TECHNIQUE   Three views of the lumbar spine were obtained. COMPARISON   Radiographs dated March 11, 2014. FINDINGS   Five lumbar-type vertebral bodies.  Multilevel intervertebral disc degeneration, moderate-to-severe at L1-2, L2-3, L4-5, and L5-S1.  Multilevel facet osteoarthritis, greatest near the lumbosacral junction. No fracture or aggressive osseous lesion identified.  Mild-to-moderate osteoarthritis of both hips.  Surgical clips in the right upper abdomen. IMPRESSION   IMPRESSION   Degenerative findings as above.

## 2023-07-14 NOTE — PATIENT INSTRUCTIONS
Knee Exercises   AMBULATORY CARE:   What you need to know about knee exercises:  Knee exercises help strengthen the muscles around your knee. Strong muscles can help reduce pain and decrease your risk of future injury. Knee exercises also help you heal after an injury or surgery. These are beginning exercises. Ask your healthcare provider if you need to see a physical therapist for more advanced exercises. General guidelines for knee exercises:   Start slowly. As you get stronger, you may be able to do more sets of each exercise or add weights. Stop if you feel pain. It is normal to feel some discomfort at first, but you should not feel pain. Tell your provider or physical therapist if you have pain while you exercise. Regular exercise will help decrease your discomfort over time. Do the exercises on both legs. Do this so both knees remain strong. Warm up before you do knee exercises. Walk or ride a stationary bike for 5 or 10 minutes to warm your muscles. How to perform knee stretches safely:  Always stretch before you do strengthening exercises. Do these stretching exercises again after you do the strengthening exercises. Do these stretches 4 or 5 days a week, or as directed. Standing calf stretch: Face a wall and place both palms flat on the wall, or hold the back of a chair for balance. Keep a slight bend in your knees. Take a big step backward with one leg. Keep your other leg directly under you. Keep both heels flat and press your hips forward. Hold the stretch for 30 seconds, and then relax for 30 seconds. Switch legs. Repeat 2 or 3 times on each leg. Standing quadriceps stretch:  Stand and place one hand against a wall or hold the back of a chair for balance. With your weight on one leg, bend your other leg and grab your ankle. Bring your heel toward your buttocks. Hold the stretch for 30 to 60 seconds. Switch legs. Repeat 2 or 3 times on each leg.          Sitting hamstring stretch:  Sit with both legs straight in front of you. Do not point or flex your toes. Place your palms on the floor and slide your hands forward until you feel the stretch. Do not round your back. Hold the stretch for 30 seconds. Repeat 2 or 3 times. How to perform knee strengthening exercises safely:  Do these exercises 4 or 5 days a week, or as directed. Standing half squats:  Stand with your feet shoulder-width apart. Lean your back against a wall or hold the back of a chair for balance, if needed. Slowly sit down about 10 inches, as if you are going to sit in a chair. Your body weight should be mostly over your heels. Hold the squat for 5 seconds, then rise to a standing position. Do 3 sets of 10 squats to strengthen your buttocks and thighs. Standing hamstring curls: Face a wall and place both palms flat on the wall, or hold the back of a chair for balance. With your weight on one leg, lift your other foot as close to your buttocks as you can. Hold for 5 seconds and then lower your leg. Do 2 sets of 10 curls on each leg. This exercise strengthens the muscles in the back of your thigh. Standing calf raises:  Face a wall and place both palms flat on the wall, or hold the back of a chair for balance. Stand up straight, and do not lean. Place all your weight on one leg by lifting the other foot off the floor. Raise the heel of the foot that is on the floor as high as you can and then lower it. Do 2 sets of 10 calf raises on each leg to strengthen your calf muscles. Straight leg lifts:  Lie on your stomach with straight legs. Fold your arms in front of you and rest your head in your arms. Tighten your leg muscles and raise one leg as high as you can. Hold for 5 seconds, then lower your leg. Do 2 sets of 10 lifts on each leg to strengthen your buttocks. Sitting leg lifts:  Sit in a chair. Slowly straighten and raise one leg. Squeeze your thigh muscles and hold for 5 seconds. Relax and return your foot to the floor. Do 2 sets of 10 lifts on each leg. This helps strengthen the muscles in the front of your thigh. Call your doctor or physical therapist if:   You have new pain or your pain becomes worse. You have questions or concerns about your condition or care. © Copyright Anitha El 2022 Information is for End User's use only and may not be sold, redistributed or otherwise used for commercial purposes. The above information is an  only. It is not intended as medical advice for individual conditions or treatments. Talk to your doctor, nurse or pharmacist before following any medical regimen to see if it is safe and effective for you.

## 2023-07-14 NOTE — PROGRESS NOTES
Assessment  1. Lumbar spondylosis - Bilateral  -     MRI lumbar spine wo contrast; Future; Expected date: 07/14/2023  -     Ambulatory referral to Physical Therapy; Future    2. Lumbar radiculopathy  -     MRI lumbar spine wo contrast; Future; Expected date: 07/14/2023  -     Ambulatory referral to Physical Therapy; Future    Greater than 90% relief of pain with improved ability to participate with IADLs after Bilateral L3, L4, L5 medial branch blocks #1 and #2 and with subsequent radiofrequency lesioning of medial branch nerves performed 6/15/23; describes now proximal radicular features in left L4 and L5 dermatome. Counseled with regard to PT, obtaining MRI. Previously reported the following symptomatology:    Axial low back pain described primarily by arthritic features. Aching, nagging, indolent, stabbing, throbbing features in axial low back without radicular components. 5/5 strength bilaterally, negative SLR. Positive facet loading maneuvers in lumbar spine elicited pain, positive tenderness to palpation over lumbar paraspinal muscles. Findings correlate with prominent lumbar facet arthropathy seen throughout axial low back on x-ray. Currently she is neurologically intact without gait instability, saddle anesthesia or bowel/bladder abnormality. Risks, benefits alternative to medial branch blocks and subsequent radiofrequency ablation if successful thoroughly discussed with patient. Handouts provided questions answered to patient satisfaction. Plan  -MRI lumbar spine noncontrast; will f/u result with patient  -script provided for formal physical therapy for lumbar facet arthropathy; Physician directed home exercise plan as per [employer] demonstrated and handouts provided that patient plans to participate with for 1 hour, twice a week for the next 6 weeks. There are risks associated with opioid medications, including dependence, addiction and tolerance.  The patient understands and agrees to use these medications only as prescribed. Potential side effects of the medications include, but are not limited to, constipation, drowsiness, addiction, impaired judgment and risk of fatal overdose if not taken as prescribed. The patient was warned against driving while taking sedation medications or operating heavy machinery. The patient voiced understanding. Sharing medications is a felony. At this point in time, the patient is showing no signs of addiction, abuse, diversion or suicidal ideation. [address] Prescription Drug Monitoring Program report was reviewed and was appropriate      Complete risks and benefits including bleeding, infection, tissue reaction, nerve injury and allergic reaction were discussed. The approach was demonstrated using models and literature was provided. Verbal and written consent was obtained. My impressions and treatment recommendations were discussed in detail with the patient who verbalized understanding and had no further questions. Discharge instructions were provided. I personally saw and examined the patient and I agree with the above discussed plan of care. No orders of the defined types were placed in this encounter. History of Present Illness    Greater than 90% relief of pain with improved ability to participate with IADLs after Bilateral L3, L4, L5 medial branch blocks #1 and #2 and with subsequent radiofrequency lesioning of medial branch nerves performed 6/15/23; describes now proximal radicular features in left L4 and L5 dermatome. Previously reported the following symptomatology:    Shawna Rodriguez is a 79 y.o. female with pmhx o fprior cerebral aneurysm, XOL, obesity, presenting with chronic low back pain described primarily as arthritic in nature. She describes 8/10 low back pain that is worse in the mornings and worse at the end of the day. The pain is characterized by achy, nagging, indolent, crampy, stabbing pain in her axial low back.   The patient describes that the pain is worse with standing for long periods of time on hard surfaces as well as with walking. The patient is a very active individual and feels as though this pain compromises his participation with independent activities of daily living. The pain can be debilitating at times and contribute to significant disability, compromising overall activity and independent activities of daily living. She has not yet tried PT for her pain. Medications the patient has tried in the past include nsaids, tylenol. She describes no radicular symptoms and has good strength. She denies any weakness numbness or paresthesias. The patient denies any bowel or bladder dysfunction, saddle anesthesia or gait instability as well. I have personally reviewed and/or updated the patient's past medical history, past surgical history, family history, social history, current medications, allergies, and vital signs today. Review of Systems   Constitutional: Positive for activity change. HENT: Negative. Eyes: Negative. Respiratory: Negative. Cardiovascular: Negative. Gastrointestinal: Negative. Endocrine: Negative. Genitourinary: Negative. Musculoskeletal: Positive for arthralgias, back pain and myalgias. Negative for gait problem. Skin: Negative. Allergic/Immunologic: Negative. Neurological: Negative for weakness and numbness. Hematological: Negative. Psychiatric/Behavioral: Negative. All other systems reviewed and are negative.       Patient Active Problem List   Diagnosis   • Chest pain   • Hyperlipidemia   • History of cerebral aneurysm repair   • Tobacco abuse   • Lumbar spondylosis       Past Medical History:   Diagnosis Date   • Anxiety    • Cerebral aneurysm    • Hyperlipidemia        Past Surgical History:   Procedure Laterality Date   • BREAST CYST ASPIRATION Right 09/02/2022    cyst that abscessed   • CARDIAC CATHETERIZATION      2005   • CEREBRAL ANEURYSM REPAIR  07/1997 at [facility]   • CHOLECYSTECTOMY     • NERVE BLOCK Bilateral 5/11/2023    Procedure: BLOCK MEDIAL BRANCH L3, L4, L5 #1;  Surgeon: Mitchell Keith MD;  Location: [facility];  Service: Pain Management    • NERVE BLOCK Bilateral 5/30/2023    Procedure: BLOCK MEDIAL BRANCH L3, L4, L5 #2;  Surgeon: Mitchell Keith MD;  Location: [facility];  Service: Pain Management    • RHIZOTOMY Bilateral 6/15/2023    Procedure: RHIZOTOMY LUMBAR Medial branch nerves L3, L4, L5;  Surgeon: Mitchell Keith MD;  Location: [facility];  Service: Pain Management        Family History   Problem Relation Age of Onset   • No Known Problems Mother    • Prostate cancer Father    • No Known Problems Daughter    • No Known Problems Daughter    • No Known Problems Maternal Grandmother    • No Known Problems Maternal Grandfather    • No Known Problems Paternal Grandmother    • No Known Problems Paternal Grandfather    • No Known Problems Paternal Aunt        Social History     Occupational History   • Not on file   Tobacco Use   • Smoking status: Every Day     Packs/day: 0.25     Years: 25.00     Total pack years: 6.25     Types: Cigarettes   • Smokeless tobacco: Never   Substance and Sexual Activity   • Alcohol use: Never   • Drug use: Never   • Sexual activity: Not on file       Current Outpatient Medications on File Prior to Visit   Medication Sig   • B Complex Vitamins (VITAMIN B COMPLEX) TABS Take 2 tablets by mouth daily    • celecoxib (CeleBREX) 200 mg capsule    • Cholecalciferol 25 MCG (1000 UT) capsule Take 1,000 Units by mouth daily    • FLUoxetine (PROzac) 40 MG capsule Take by mouth daily    • lansoprazole (PREVACID) 30 mg capsule Take by mouth daily    • Multiple Vitamins-Minerals (CENTRUM SILVER ULTRA WOMENS) TABS Take 1 tablet by mouth daily    • Progesterone 200 MG CAPS Take 1 capsule by mouth every evening   • ramelteon (ROZEREM) 8 mg tablet 8 mg daily at bedtime    • simvastatin (ZOCOR) 40 mg tablet Take 40 mg by mouth daily at bedtime • tiZANidine (ZANAFLEX) 4 mg tablet Take 1 tablet by mouth every 6 (six) hours as needed   • traZODone (DESYREL) 50 mg tablet Take 50 mg by mouth daily at bedtime    • valACYclovir (VALTREX) 1,000 mg tablet TAKE 2 TABLETS BY MOUTH EVERY 12 HOURS AS DIRECTED   • vitamin A 10,000 units capsule Take 10,000 Units by mouth daily    • vitamin E, tocopherol, 400 units capsule Take 400 Units by mouth daily    • ALPRAZolam (XANAX) 1 mg tablet Pt states, takes for anxiety prn   • aspirin 81 mg chewable tablet Chew 1 tablet (81 mg total) daily (Patient not taking: Reported on 4/5/2023)   • calcium carbonate (OS-NATHANAEL) 600 MG tablet Take 1,200 mg by mouth daily at bedtime  (Patient not taking: Reported on 4/5/2023)   • halobetasol (ULTRAVATE) 0.05 % cream Apply 1 application topically daily at bedtime (Patient not taking: Reported on 4/5/2023)   • halobetasol (ULTRAVATE) 0.05 % cream Apply 1 application topically as needed (Patient not taking: Reported on 4/5/2023)   • HYDROcodone-acetaminophen (NORCO) 7.5-325 mg per tablet Take 1 tablet by mouth every 4-6 hours as needed (Patient not taking: Reported on 4/5/2023)   • Magnesium 100 MG CAPS Take 1 capsule by mouth 2 (two) times a day  (Patient not taking: Reported on 4/5/2023)   • Mirabegron ER 50 MG TB24 Take 1 tablet by mouth daily  (Patient not taking: Reported on 4/5/2023)   • Omega-3 Fatty Acids (OMEGA-3 FISH OIL) 1000 MG CAPS Take 1 capsule by mouth daily  (Patient not taking: Reported on 4/5/2023)   • oxyCODONE (ROXICODONE) 5 immediate release tablet Take 1 tablet (5 mg total) by mouth every 6 (six) hours as needed for moderate pain for up to 6 doses Max Daily Amount: 20 mg   • polyethylene glycol-propylene glycol (SYSTANE) 0.4-0.3 % Apply to eye   • progesterone (PROMETRIUM) 200 MG capsule    • solifenacin (VESICARE) 10 MG tablet Take 10 mg by mouth daily (Patient not taking: Reported on 5/19/2023)   • triamterene-hydrochlorothiazide (MAXZIDE-25) 37.5-25 mg per tablet (Patient not taking: Reported on 5/19/2023)     No current facility-administered medications on file prior to visit. Allergies   Allergen Reactions   • Zolpidem Other (See Comments)     Felt like speeding. • Phenylephrine-Acetaminophen Other (See Comments)     Heart races     Physical Exam    /86   Temp 97.7 °F (36.5 °C)   Resp 20   Ht 5' 7" (1.702 m)   Wt 95.3 kg (210 lb 3.2 oz)   BMI 32.92 kg/m²     Constitutional: normal, well developed, well nourished, alert, in no distress and non-toxic and no overt pain behavior. and obese  Eyes: anicteric  HEENT: grossly intact  Neck: supple, symmetric, trachea midline and no masses   Pulmonary:even and unlabored  Cardiovascular:No edema or pitting edema present  Skin:Normal without rashes or lesions and well hydrated  Psychiatric:Mood and affect appropriate  Neurologic:Cranial Nerves II-XII grossly intact Sensation grossly intact; no clonus negative morales's. Reflexes 2+ and brisk. SLR negative bilaterally. Musculoskeletal:normal gait. 5/5 strength bilaterally with AROM in lower extremities. Normal heel toe and tip toe walking. Significant pain with lumbar facet loading bilaterally and with lateral spine rotation. ttp over lumbar paraspinal muscles. Negative alyson's test, negative gaenslen's negative SIJ loading bilaterally. Zenaida Sutton MD - 02/06/2023   Formatting of this note might be different from the original.   EXAM   XR L SPINE AP AND LATERAL-   2/3/2023 3:18 pm     HISTORY   Provided clinical history: "worsening low back pain Lumbar DDD"     TECHNIQUE   Three views of the lumbar spine were obtained. COMPARISON   Radiographs dated March 11, 2014. FINDINGS   Five lumbar-type vertebral bodies.  Multilevel intervertebral disc degeneration, moderate-to-severe at L1-2, L2-3, L4-5, and L5-S1.  Multilevel facet osteoarthritis, greatest near the lumbosacral junction. No fracture or aggressive osseous lesion identified.  Mild-to-moderate osteoarthritis of both hips.  Surgical clips in the right upper abdomen. IMPRESSION   IMPRESSION   Degenerative findings as above.

## 2023-07-24 ENCOUNTER — HOSPITAL ENCOUNTER (OUTPATIENT)
Dept: MRI IMAGING | Facility: HOSPITAL | Age: 68
Discharge: HOME | End: 2023-07-24
Attending: ANESTHESIOLOGY
Payer: MEDICARE

## 2023-07-24 DIAGNOSIS — M47.816 LUMBAR SPONDYLOSIS: ICD-10-CM

## 2023-07-24 DIAGNOSIS — M54.16 LUMBAR RADICULOPATHY: ICD-10-CM

## 2023-07-24 PROCEDURE — G1004 CDSM NDSC: HCPCS

## 2023-07-24 PROCEDURE — 72148 MRI LUMBAR SPINE W/O DYE: CPT

## 2023-07-25 ENCOUNTER — TELEPHONE (OUTPATIENT)
Dept: PAIN MEDICINE | Facility: CLINIC | Age: 68
End: 2023-07-25

## 2023-07-25 ENCOUNTER — PREP FOR PROCEDURE (OUTPATIENT)
Dept: PAIN MEDICINE | Facility: CLINIC | Age: 68
End: 2023-07-25

## 2023-07-25 DIAGNOSIS — M54.16 LUMBAR RADICULOPATHY: Primary | ICD-10-CM

## 2023-07-25 NOTE — TELEPHONE ENCOUNTER
Multilevel degenerative change without high-grade canal or foraminal stenosis as detailed, worse at level L5-S1 with bilateral lateral recess stenosis impacting S1 nerve roots. Correlates for S1 radiculopathy. Moderate bilateral lateral recess narrowing at L4-5 also contributory. Above MRI findings discussed with patient via voicemail; will plan for ILESI at L5-S1 to target radicular pain; informed consent to be obtained day of procedure.

## 2023-08-03 ENCOUNTER — APPOINTMENT (OUTPATIENT)
Dept: RADIOLOGY | Facility: HOSPITAL | Age: 68
End: 2023-08-03
Payer: MEDICARE

## 2023-08-03 ENCOUNTER — HOSPITAL ENCOUNTER (OUTPATIENT)
Facility: HOSPITAL | Age: 68
Setting detail: OUTPATIENT SURGERY
Discharge: HOME | End: 2023-08-03
Attending: ANESTHESIOLOGY | Admitting: ANESTHESIOLOGY
Payer: MEDICARE

## 2023-08-03 VITALS
HEART RATE: 65 BPM | OXYGEN SATURATION: 97 % | RESPIRATION RATE: 18 BRPM | BODY MASS INDEX: 32.96 KG/M2 | HEIGHT: 67 IN | SYSTOLIC BLOOD PRESSURE: 148 MMHG | WEIGHT: 210 LBS | TEMPERATURE: 97.4 F | DIASTOLIC BLOOD PRESSURE: 66 MMHG

## 2023-08-03 PROCEDURE — 62323 NJX INTERLAMINAR LMBR/SAC: CPT | Performed by: ANESTHESIOLOGY

## 2023-08-03 RX ORDER — SODIUM CHLORIDE 9 MG/ML
INJECTION INTRAVENOUS AS NEEDED
Status: DISCONTINUED | OUTPATIENT
Start: 2023-08-03 | End: 2023-08-03 | Stop reason: HOSPADM

## 2023-08-03 RX ORDER — METHYLPREDNISOLONE ACETATE 80 MG/ML
INJECTION, SUSPENSION INTRA-ARTICULAR; INTRALESIONAL; INTRAMUSCULAR; SOFT TISSUE AS NEEDED
Status: DISCONTINUED | OUTPATIENT
Start: 2023-08-03 | End: 2023-08-03 | Stop reason: HOSPADM

## 2023-08-03 RX ORDER — LIDOCAINE HYDROCHLORIDE 10 MG/ML
INJECTION, SOLUTION EPIDURAL; INFILTRATION; INTRACAUDAL; PERINEURAL AS NEEDED
Status: DISCONTINUED | OUTPATIENT
Start: 2023-08-03 | End: 2023-08-03 | Stop reason: HOSPADM

## 2023-08-03 NOTE — PROCEDURES
Pre-procedure Diagnosis:       Lumbar Radiculopathy  Post-procedure Diagnosis:     Lumbar Radiculopathy  Procedure Title(s):                  1. [L5-S1] interlaminar epidural steroid injection                                                  2. Intraoperative fluoroscopy  Attending Surgeon:                 Trinidad Dowell MD  Anesthesia:                             Local      Indications: The patient is a  79y.o. year-old female  with a diagnosis of Lumbar Radiculopathy. The patient's history and physical exam were reviewed. The risks, benefits and alternatives to the procedure were discussed, and all questions were answered to the patient's satisfaction. The patient agreed to proceed, and written informed consent was obtained. Procedure in Detail: The patient was brought into the procedure room and placed in the prone position on the fluoroscopy table. The area of the lumbar spine was prepped with chlorhexidine gluconate solution times one and draped in a sterile manner. The [L5-S1] interspace was identified and marked under AP fluoroscopy. The skin and subcutaneous tissues in the area were anesthetized with 1% lidocaine. A 18-gauge Tuohy epidural needle was directed toward the interspace under fluoroscopic guidance until the ligamentum flavum was engaged. From this point, a loss of resistance technique with saline was used to identify entrance of the needle into the epidural space. Once an appropriate loss was obtained, negative aspiration was confirmed, and 1 ml Omnipaque 240 contrast solution was injected. An appropriate epidurogram was noted. Then, after negative aspiration, a solution consisting of 1-mL depo-medrol (80mg/mL) and 3-mL preservative-free saline was easily injected. The needle was removed with a 1% lidocaine flush. The patient's back was cleaned and a bandage was placed over the site of needle insertion.      Disposition: The patient tolerated the procedure well, and there were no apparent complications. The patient was taken to the recovery area where written discharge instructions for the procedure were given.       Estimated Blood Loss: None  Specimens Obtained: N/A

## 2023-08-03 NOTE — OP NOTE
OPERATIVE REPORT  PATIENT NAME: Magalys Carmichael    :  1955  MRN: [id number]  Pt Location:  GI ROOM 01    SURGERY DATE: 8/3/2023    Surgeon(s) and Role: Real Zelaya MD - Primary    Preop Diagnosis:  Lumbar radiculopathy [M54.16]    Post-Op Diagnosis Codes:     * Lumbar radiculopathy [M54.16]    Procedure(s):  L5-S1 ILESI    Specimen(s):  * No specimens in log *    Estimated Blood Loss:   Minimal    Drains:  * No LDAs found *    Anesthesia Type:   Local    Operative Indications:  Lumbar radiculopathy [M54.16]    Operative Findings:  L5-S1 epidurogram    Complications:   None    Procedure and Technique:  Please see detailed procedure note    I was present for the entire procedure.     Patient Disposition:  PACU     SIGNATURE: Real Zelaya MD  DATE: August 3, 2023  TIME: 9:32 AM

## 2023-08-03 NOTE — INTERVAL H&P NOTE
H&P reviewed. After examining the patient I find no changes in the patients condition since the H&P had been written.     Vitals:    08/03/23 0821   BP: 133/65   Pulse: 77   Resp: 18   Temp: 98.3 °F (36.8 °C)   SpO2: 97%

## 2023-08-04 ENCOUNTER — HOSPITAL ENCOUNTER (OUTPATIENT)
Dept: ULTRASOUND IMAGING | Facility: HOSPITAL | Age: 68
End: 2023-08-04
Payer: MEDICARE

## 2023-08-04 DIAGNOSIS — N28.1 CYST OF KIDNEY, ACQUIRED: ICD-10-CM

## 2023-08-04 DIAGNOSIS — N28.9 DISORDER OF KIDNEY AND URETER, UNSPECIFIED: ICD-10-CM

## 2023-08-04 PROCEDURE — 76775 US EXAM ABDO BACK WALL LIM: CPT

## 2023-08-10 ENCOUNTER — TELEPHONE (OUTPATIENT)
Dept: PAIN MEDICINE | Facility: CLINIC | Age: 68
End: 2023-08-10

## 2023-08-15 ENCOUNTER — EVALUATION (OUTPATIENT)
Dept: PHYSICAL THERAPY | Facility: CLINIC | Age: 68
End: 2023-08-15
Payer: MEDICARE

## 2023-08-15 DIAGNOSIS — M47.816 LUMBAR SPONDYLOSIS: ICD-10-CM

## 2023-08-15 DIAGNOSIS — M54.16 LUMBAR RADICULOPATHY: ICD-10-CM

## 2023-08-15 PROCEDURE — 97140 MANUAL THERAPY 1/> REGIONS: CPT | Performed by: PHYSICAL THERAPIST

## 2023-08-15 PROCEDURE — 97110 THERAPEUTIC EXERCISES: CPT | Performed by: PHYSICAL THERAPIST

## 2023-08-15 PROCEDURE — 97161 PT EVAL LOW COMPLEX 20 MIN: CPT | Performed by: PHYSICAL THERAPIST

## 2023-08-15 NOTE — PROGRESS NOTES
PT Evaluation     Today's date: 8/15/2023  Patient name: Bradley Boateng  : 1955  MRN: [id number]  Referring provider: Helga Gorman MD  Dx:   Encounter Diagnosis     ICD-10-CM    1. Lumbar spondylosis - Bilateral  M47.816 Ambulatory referral to Physical Therapy      2. Lumbar radiculopathy  M54.16 Ambulatory referral to Physical Therapy                     Assessment  Assessment details: Patient is a 79 y.o. female who is responding well to pain management intervention and is now presenting to PT to improve strength and flexibility. Patient does demonstrate limited and painful ROM of the L/S into lateral flexion and extension. Patient demonstrate strength deficits of the core at 3+/5 and erector spinae at 4/5. She demonstrates mild weakness through her B hips. She has decreased segmental mobility of the L/S from L2-5. Patient is expected to respond well to PT intervention to increase AROM and improve strength while working to decrease remaining pain symptoms. Impairments: activity intolerance, lacks appropriate home exercise program and pain with function    Symptom irritability: moderateUnderstanding of Dx/Px/POC: excellent  Goals  STG - 4 weeks  Patient able to demonstrate increased L/S AROM by 25% into extension. Patient to report pain at worst 4/10 with daily activity. Patient to have increased strength of the B hips to 4+/5. Patient to be independent with HEP. LTG - 8 weeks  Patient able to demonstrate painfree AROM fo the L/S. Patient able to demonstrate good posture in sitting and standing. Patient able to demonstrate proper body mechanics for lifting and carrying items. Patient able to ambulate with normalized gait pattern. Patient to demonstrate core strength at  4/5 . Patient to be independent with final HEP. Plan  Plan details: Patient's evaluation is completed. Patient was instructed with a HEP this date. Patient has scheduled further PT sessions for treatment.    Patient would benefit from: skilled physical therapy  Planned modality interventions: TENS, thermotherapy: hydrocollator packs and cryotherapy  Planned therapy interventions: manual therapy, neuromuscular re-education, therapeutic exercise, therapeutic training and home exercise program  Frequency: 2x week  Duration in weeks: 8  Plan of Care beginning date: 8/15/2023  Plan of Care expiration date: 10/10/2023        Subjective Evaluation    History of Present Illness  Mechanism of injury: Patient has had injections in the L/S that have improved her symptoms 75-90%. She is hoping PT will help increase strength. She notes that standing typically increases her pain rapidly. She notes vacuuming/ making beds increases pain. She notes that any lifting will provoke symptoms. She notes that pain is always present. Symptoms are centrally located in the low back. Symptoms have been present since 2022. Patient works part-time at [employer]. Patient Goals  Patient goals for therapy: decreased pain, increased motion and increased strength    Pain  Current pain ratin  At best pain ratin  At worst pain rating: 10  Quality: sharp and dull ache  Relieving factors: heat, ice and medications    Social Support  Steps to enter house: yes  4  Stairs in house: yes   13  Lives in: multiple-level home    Employment status: working  Treatments  Previous treatment: injection treatment        Objective     Postural Observations  Seated posture: fair  Standing posture: fair  Correction of posture: has no consistent effect        Palpation   Left   Tenderness of the erector spinae, lumbar paraspinals and quadratus lumborum. Right   Tenderness of the erector spinae, lumbar paraspinals and quadratus lumborum.      Active Range of Motion     Lumbar   Flexion:  WFL  Extension:  Restriction level: moderate  Left lateral flexion:  Restriction level: minimal  Right lateral flexion:  Restriction level: minimal  Left rotation: WFL  Right rotation:  [facility]    Strength/Myotome Testing     Left Hip   Planes of Motion   Flexion: 4  Extension: 3+  Abduction: 3+  Adduction: 4    Right Hip   Planes of Motion   Flexion: 4  Extension: 3+  Abduction: 3+  Adduction: 4    Left Knee   Flexion: 4  Extension: 4    Right Knee   Flexion: 4  Extension: 4    Left Ankle/Foot   Dorsiflexion: 4+  Plantar flexion: 4+    Right Ankle/Foot   Dorsiflexion: 4+  Plantar flexion: 4+    Tests     Lumbar     Left   Negative passive SLR and slump test.     Right   Negative passive SLR and slump test.              Precautions: None     Daily Treatment Diary:      Initial Evaluation Date: 08/15/23  Compliance 8/15                     Visit Number 1                    Re-Eval  IE                 MC   Foto Captured Y                           8/15                     Manual                      L/S PA glides 5 min                     L/S STM/ DTM 5 min                                           Ther-Ex                      LTR 10" x3                     SKTC stretch 10" x3                     Piriformis stretch 10" x3                     Open book stretch --->            TA set x10                     TA with PPT x10                     TA with march --->                     TA with SLR --->                     TA bridge --->                     Seated p-ball roll outs --->                     Nu- step --->                     Neuro Re-Ed                      Prone hip ext -pillow under abdomen --->                                                                         Ther-Act              STS                      Step ups                           Modalities                      HP/ CP PRN

## 2023-08-16 ENCOUNTER — DOCTOR'S OFFICE (OUTPATIENT)
Dept: URBAN - NONMETROPOLITAN AREA CLINIC 1 | Facility: CLINIC | Age: 68
Setting detail: OPHTHALMOLOGY
End: 2023-08-16
Payer: COMMERCIAL

## 2023-08-16 DIAGNOSIS — H16.222: ICD-10-CM

## 2023-08-16 DIAGNOSIS — H16.221: ICD-10-CM

## 2023-08-16 DIAGNOSIS — Z96.1: ICD-10-CM

## 2023-08-16 DIAGNOSIS — H40.013: ICD-10-CM

## 2023-08-16 DIAGNOSIS — H40.033: ICD-10-CM

## 2023-08-16 PROCEDURE — 92014 COMPRE OPH EXAM EST PT 1/>: CPT | Performed by: OPHTHALMOLOGY

## 2023-08-16 PROCEDURE — 76514 ECHO EXAM OF EYE THICKNESS: CPT | Performed by: OPHTHALMOLOGY

## 2023-08-16 PROCEDURE — 92083 EXTENDED VISUAL FIELD XM: CPT | Performed by: OPHTHALMOLOGY

## 2023-08-16 ASSESSMENT — REFRACTION_MANIFEST
OS_ADD: +2.50
OS_SPHERE: -0.75
OD_ADD: +2.50
OS_AXIS: 060
OU_VA: 20/20-2
OD_AXIS: 100
OS_VA2: 20/25+2
OS_CYLINDER: -0.75
OS_VA1: 20/25+2
OD_VA2: 20/25+2
OD_SPHERE: PLANO
OD_VA1: 20/25+2
OD_CYLINDER: -1.25

## 2023-08-16 ASSESSMENT — REFRACTION_AUTOREFRACTION
OD_SPHERE: +0.50
OS_AXIS: 145
OD_CYLINDER: -1.00
OD_AXIS: 142
OS_CYLINDER: -1.25
OS_SPHERE: -0.50

## 2023-08-16 ASSESSMENT — VISUAL ACUITY
OD_BCVA: 20/70
OS_BCVA: 20/25+2

## 2023-08-16 ASSESSMENT — PACHYMETRY
OS_CT_CORRECTION: 1
OD_CT_UM: 531
OD_CT_CORRECTION: 1
OS_CT_UM: 538

## 2023-08-16 ASSESSMENT — AXIALLENGTH_DERIVED
OS_AL: 23.1657
OD_AL: 22.9019
OS_AL: 23.1657

## 2023-08-16 ASSESSMENT — KERATOMETRY
OS_K1POWER_DIOPTERS: 45.50
OS_AXISANGLE_DEGREES: 135
OD_K2POWER_DIOPTERS: 46.87
METHOD_AUTO_MANUAL: MANUAL
OS_K2POWER_DIOPTERS: 46.25
OD_K1POWER_DIOPTERS: 44.00
OD_AXISANGLE_DEGREES: 45

## 2023-08-16 ASSESSMENT — SUPERFICIAL PUNCTATE KERATITIS (SPK)
OS_SPK: T 1+
OD_SPK: T 1+

## 2023-08-16 ASSESSMENT — CONFRONTATIONAL VISUAL FIELD TEST (CVF)
OS_FINDINGS: FULL
OD_FINDINGS: FULL

## 2023-08-16 ASSESSMENT — SPHEQUIV_DERIVED
OD_SPHEQUIV: 0
OS_SPHEQUIV: -1.125
OS_SPHEQUIV: -1.125

## 2023-08-16 ASSESSMENT — TONOMETRY
OD_IOP_MMHG: 16
OS_IOP_MMHG: 15

## 2023-08-16 ASSESSMENT — REFRACTION_CURRENTRX
OS_SPHERE: +1.75
OD_OVR_VA: 20/
OS_OVR_VA: 20/
OD_SPHERE: +1.75

## 2023-08-17 ENCOUNTER — OFFICE VISIT (OUTPATIENT)
Dept: PHYSICAL THERAPY | Facility: CLINIC | Age: 68
End: 2023-08-17
Payer: MEDICARE

## 2023-08-17 DIAGNOSIS — M54.16 LUMBAR RADICULOPATHY: ICD-10-CM

## 2023-08-17 DIAGNOSIS — M47.816 LUMBAR SPONDYLOSIS: Primary | ICD-10-CM

## 2023-08-17 PROCEDURE — 97140 MANUAL THERAPY 1/> REGIONS: CPT

## 2023-08-17 PROCEDURE — 97110 THERAPEUTIC EXERCISES: CPT

## 2023-08-17 NOTE — PROGRESS NOTES
Daily Note     Today's date: 2023  Patient name: Bradley Boateng  : 1955  MRN: [id number]  Referring provider: Helga Gorman MD  Dx:   Encounter Diagnosis     ICD-10-CM    1. Lumbar spondylosis - Bilateral  M47.816       2. Lumbar radiculopathy  M54.16                      Subjective: Patient reports she had a lot of relief following the MT last session. Offers no complaints at start of session. Objective: See treatment diary below      Assessment: Tolerated treatment well without complaint. Patient required repeated verbal cues to avoid holding her breathe with core exercises. Educated patient on importance of core strengthening and she verbalized understanding. Moderate relief following MT per patient report. Patient would benefit from continued PT to increase trunk mobility and core strength for improved function in ADLs. Plan: Continue per plan of care.       Precautions: None     Daily Treatment Diary:      Initial Evaluation Date: 08/15/23  Compliance 8/15  8/17                   Visit Number 1 2                   Re-Eval  IE                 MC   Foto Captured Y                           8/15  8/17                   Manual                      L/S PA glides 5 min  5min                   L/S STM/ DTM 5 min 10 min                                         Ther-Ex                      LTR 10" x3  5"x10                   SKTC stretch 10" x3  5"x5                   Piriformis stretch 10" x3  15"x3                   Open book stretch ---> 5"x10 ea           TA set x10                     TA with PPT x10  5"x15                   TA with march --->  x15                   TA with SLR --->  2x10                   TA bridge --->  15x                   SL clamshells  15x ea           Seated p-ball roll outs --->                     Nu- step --->  L3 7 min                   Neuro Re-Ed                      Prone hip ext -pillow under abdomen ---> Ther-Act              STS                      Step ups                           Modalities                      HP/ CP PRN  10'

## 2023-08-22 ENCOUNTER — OFFICE VISIT (OUTPATIENT)
Dept: PHYSICAL THERAPY | Facility: CLINIC | Age: 68
End: 2023-08-22
Payer: MEDICARE

## 2023-08-22 DIAGNOSIS — M47.816 LUMBAR SPONDYLOSIS: Primary | ICD-10-CM

## 2023-08-22 DIAGNOSIS — M54.16 LUMBAR RADICULOPATHY: ICD-10-CM

## 2023-08-22 PROCEDURE — 97110 THERAPEUTIC EXERCISES: CPT | Performed by: PHYSICAL THERAPIST

## 2023-08-22 PROCEDURE — 97140 MANUAL THERAPY 1/> REGIONS: CPT | Performed by: PHYSICAL THERAPIST

## 2023-08-22 NOTE — PROGRESS NOTES
Daily Note     Today's date: 2023  Patient name: Ebonie   : 1955  MRN: [id number]  Referring provider: Tracy Myers MD  Dx:   Encounter Diagnosis     ICD-10-CM    1. Lumbar spondylosis - Bilateral  M47.816       2. Lumbar radiculopathy  M54.16                      Subjective: Patient notes increased pain symptoms since her last PT session however is not sure that an exercise is causing the pain. She notes pain increases with transitions from supine to stand and sit to stand. Objective: See treatment diary below      Assessment: Tolerated treatment well. Patient would benefit from continued PT. Patient responded well to use of manual treatment and gentle stretching. TENS was trialed this date with improved pain complaints at the end of the session. Plan: Continue per plan of care.       Precautions: None     Daily Treatment Diary:      Initial Evaluation Date: 08/15/23  Compliance 8/15  8/17  8/22                 Visit Number 1 2  3                 Re-Eval  IE                 [facility]   Foto Captured Y                           8/15  8/17  8/22                 Manual                      L/S PA glides 5 min  5min  5 min                 L/S STM/ DTM 5 min 10 min  10 min                                       Ther-Ex                      LTR 10" x3  5"x10  5" x10                 SKTC stretch 10" x3  5"x5  10" x5                 Piriformis stretch 10" x3  15"x3  10"                 Open book stretch ---> 5"x10 ea 5" x10          TA set x10    -                 TA with PPT x10  5"x15  -                 TA with march --->  x15  -                 TA with SLR --->  2x10  -                 TA bridge --->  15x  -                 SL clamshells  15x ea -          Seated p-ball roll outs --->    -                 Nu- step --->  L3 7 min  L3 7 min (left knee pain)                 Neuro Re-Ed                      Prone hip ext -pillow under abdomen ---> Ther-Act              STS                      Step ups                           Modalities                      HP/ CP PRN  10' CP 10'

## 2023-08-24 ENCOUNTER — OFFICE VISIT (OUTPATIENT)
Dept: PHYSICAL THERAPY | Facility: CLINIC | Age: 68
End: 2023-08-24
Payer: MEDICARE

## 2023-08-24 DIAGNOSIS — M54.16 LUMBAR RADICULOPATHY: ICD-10-CM

## 2023-08-24 DIAGNOSIS — M47.816 LUMBAR SPONDYLOSIS: Primary | ICD-10-CM

## 2023-08-24 PROCEDURE — 97110 THERAPEUTIC EXERCISES: CPT | Performed by: PHYSICAL THERAPIST

## 2023-08-24 PROCEDURE — 97140 MANUAL THERAPY 1/> REGIONS: CPT | Performed by: PHYSICAL THERAPIST

## 2023-08-24 NOTE — PROGRESS NOTES
Daily Note     Today's date: 2023  Patient name: Douglas Olmstead  : 1955  MRN: [id number]  Referring provider: Barbara Patricia MD  Dx:   Encounter Diagnosis     ICD-10-CM    1. Lumbar spondylosis - Bilateral  M47.816       2. Lumbar radiculopathy  M54.16                      Subjective: Patient notes that LBP is present this date. She notes she will be limited with her ability to complete exercise this date due to having hormone pellets placed yesterday and she is nervous they may dislodge with exercise. Objective: See treatment diary below      Assessment: Tolerated treatment well. Patient would benefit from continued PT. Patient able to work through some exercise this date without irritation to the pellet area. Will resume full exercise program next session if tolerated well. Patient again responded well to manual treatment technique. Plan: Continue per plan of care.       Precautions: None     Daily Treatment Diary:      Initial Evaluation Date: 08/15/23  Compliance 8/15  8/17  8/22  8/24               Visit Number 1 2  3  4               Re-Eval  IE                 [facility] Captured Y                           8/15  8/17  8/22  8/24               Manual                      L/S PA glides 5 min  5min  5 min  5 min               L/S STM/ DTM 5 min 10 min  10 min  10 min                                     Ther-Ex                      LTR 10" x3  5"x10  5" x10  5" x10               SKTC stretch 10" x3  5"x5  10" x5  held               Piriformis stretch 10" x3  15"x3  10"  held               Open book stretch ---> 5"x10 ea 5" x10 5" x10         TA set x10    -  x10               TA with PPT x10  5"x15  -  -               TA with march --->  x15  -  -               TA with SLR --->  2x10  -  1x10               TA bridge --->  15x  -  -               SL clamshells  15x ea - 15x ea         Seated p-ball roll outs --->    -  -               Nu- step --->  L3 7 min  L3 7 min (left knee pain) -               Neuro Re-Ed                      Prone hip ext -pillow under abdomen --->                                                                         Ther-Act              STS                      Step ups                           Modalities                      HP/ CP PRN  10' CP 10'

## 2023-08-29 ENCOUNTER — OFFICE VISIT (OUTPATIENT)
Dept: PHYSICAL THERAPY | Facility: CLINIC | Age: 68
End: 2023-08-29
Payer: MEDICARE

## 2023-08-29 DIAGNOSIS — M54.16 LUMBAR RADICULOPATHY: ICD-10-CM

## 2023-08-29 DIAGNOSIS — M47.816 LUMBAR SPONDYLOSIS: Primary | ICD-10-CM

## 2023-08-29 PROCEDURE — 97140 MANUAL THERAPY 1/> REGIONS: CPT

## 2023-08-29 PROCEDURE — 97110 THERAPEUTIC EXERCISES: CPT

## 2023-08-29 NOTE — PROGRESS NOTES
Daily Note     Today's date: 2023  Patient name: Jaxon Schultz  : 1955  MRN: [id number]  Referring provider: Harleen Carballo MD  Dx:   Encounter Diagnosis     ICD-10-CM    1. Lumbar spondylosis - Bilateral  M47.816       2. Lumbar radiculopathy  M54.16                      Subjective: Patient reports she has had lots of pain in low back and L buttock with going back to school. She says she was covering areas at work and standing a lot. She notes yesterday she took tylenol with codeine and 3 Motrin. Her pain today is rated as 7/10. She says "yesterday it was above a 10/10."       Objective: See treatment diary below      Assessment: Tolerated treatment well with no significant increase in low back or L knee pain. Addition of piriformis release with increased pressure tolerated well. Significant relief following MT today per patient report. Patient would benefit from continued PT to increase trunk mobility and core strength for improved function in ADLs. Plan: Continue per plan of care.       Precautions: None     Daily Treatment Diary:      Initial Evaluation Date: 08/15/23  Compliance 8/15  8/17  8/22  8/24  8/29             Visit Number 1 2  3  4  5             Re-Eval  IE                 [facility]   Foto Captured Y                           8/15  8/17  8/22  8/24  8/29             Manual                      L/S PA glides 5 min  5min  5 min  5 min  5m CD             L/S STM/ DTM 5 min 10 min  10 min  10 min  and L piri release 12m                                   Ther-Ex                      LTR 10" x3  5"x10  5" x10  5" x10  5"x10             SKTC stretch 10" x3  5"x5  10" x5  held  5"x5             Piriformis stretch 10" x3  15"x3  10"  held 10"x3             Open book stretch ---> 5"x10 ea 5" x10 5" x10 5" x10        TA set x10    -  x10               TA with PPT x10  5"x15  -  -  5"x10             TA with march --->  x15  -  -  x15             TA with SLR --->  2x10  -  1x10  10x ea TA bridge --->  15x  -  -               SL clamshells  15x ea - 15x ea 2x10 ea        Seated p-ball roll outs --->    -  -               Nu- step --->  L3 7 min  L3 7 min (left knee pain)  -               Neuro Re-Ed                      Prone hip ext -pillow under abdomen --->                                                                         Ther-Act              STS                      Step ups                           Modalities                      HP/ CP PRN  10' CP 10'  And TENS 10' post

## 2023-08-30 ENCOUNTER — OFFICE VISIT (OUTPATIENT)
Dept: PAIN MEDICINE | Facility: CLINIC | Age: 68
End: 2023-08-30
Payer: MEDICARE

## 2023-08-30 VITALS
HEIGHT: 67 IN | SYSTOLIC BLOOD PRESSURE: 160 MMHG | TEMPERATURE: 97.6 F | DIASTOLIC BLOOD PRESSURE: 98 MMHG | OXYGEN SATURATION: 96 % | HEART RATE: 72 BPM | WEIGHT: 210 LBS | BODY MASS INDEX: 32.96 KG/M2

## 2023-08-30 DIAGNOSIS — M54.16 LUMBAR RADICULOPATHY: ICD-10-CM

## 2023-08-30 DIAGNOSIS — M48.062 SPINAL STENOSIS OF LUMBAR REGION WITH NEUROGENIC CLAUDICATION: Primary | ICD-10-CM

## 2023-08-30 PROCEDURE — 99214 OFFICE O/P EST MOD 30 MIN: CPT | Performed by: ANESTHESIOLOGY

## 2023-08-30 RX ORDER — LIDOCAINE HYDROCHLORIDE 10 MG/ML
5 INJECTION, SOLUTION EPIDURAL; INFILTRATION; INTRACAUDAL; PERINEURAL ONCE
OUTPATIENT
Start: 2023-08-30 | End: 2023-08-30

## 2023-08-30 RX ORDER — GABAPENTIN 300 MG/1
300 CAPSULE ORAL 3 TIMES DAILY
Qty: 90 CAPSULE | Refills: 2 | Status: SHIPPED | OUTPATIENT
Start: 2023-08-30

## 2023-08-30 RX ORDER — METHYLPREDNISOLONE ACETATE 80 MG/ML
80 INJECTION, SUSPENSION INTRA-ARTICULAR; INTRALESIONAL; INTRAMUSCULAR; PARENTERAL; SOFT TISSUE ONCE
OUTPATIENT
Start: 2023-08-30 | End: 2023-08-30

## 2023-08-30 RX ORDER — 0.9 % SODIUM CHLORIDE 0.9 %
3 VIAL (ML) INJECTION ONCE
OUTPATIENT
Start: 2023-08-30 | End: 2023-08-30

## 2023-08-30 NOTE — H&P (VIEW-ONLY)
Assessment  1. Spinal stenosis of lumbar region with neurogenic claudication  -     gabapentin (NEURONTIN) 300 mg capsule; Take 1 capsule (300 mg total) by mouth 3 (three) times a day  -     Case request operating room: BLOCK / INJECTION CAUDAL; Standing  -     Case request operating room: BLOCK / [address]  -     Ambulatory referral to Neurosurgery; Future    2. Lumbar radiculopathy  -     gabapentin (NEURONTIN) 300 mg capsule; Take 1 capsule (300 mg total) by mouth 3 (three) times a day  -     Case request operating room: BLOCK / INJECTION CAUDAL; Standing  -     Case request operating room: BLOCK / INJECTION CAUDAL    Greater than 90% relief of pain with improved ability to participate with IADLs after Bilateral L3, L4, L5 medial branch blocks #1 and #2 and with subsequent radiofrequency lesioning of medial branch nerves performed 6/15/23; describes now proximal radicular features in left L4 and L5 dermatome and shopping cart sign. On MRI Multilevel degenerative change without high-grade canal or foraminal stenosis as detailed, worse at level L5-S1 with bilateral lateral recess stenosis impacting S1 nerve roots. Correlates for S1 radiculopathy. Moderate bilateral lateral recess narrowing at L4-5 also contributory. No significant relief with recent ILESI at L5-S1. Counseled with regard to PT, obtaining MRI. Previously reported the following symptomatology:    Axial low back pain described primarily by arthritic features. Aching, nagging, indolent, stabbing, throbbing features in axial low back without radicular components. 5/5 strength bilaterally, negative SLR. Positive facet loading maneuvers in lumbar spine elicited pain, positive tenderness to palpation over lumbar paraspinal muscles. Findings correlate with prominent lumbar facet arthropathy seen throughout axial low back on x-ray. Currently she is neurologically intact without gait instability, saddle anesthesia or bowel/bladder abnormality. Risks, benefits alternative to medial branch blocks and subsequent radiofrequency ablation if successful thoroughly discussed with patient. Handouts provided questions answered to patient satisfaction. Plan  -Caudal VENICE; f/u 2 weeks post procedure  -NSGY referral given spinal stenosis with neurogenic claudication; counseled regarding red flag sxs  -gabapentin 300 mg t.i.d. Ordered for patient; counseled regarding sedative effects of taking this medication and provided up titration calendar. Counseled not to take medication while driving or operating heavy machinery/using stairs  -Celebrex 200 mg b.i.d. prn pain previously prescribed. Patient educated regarding bleeding risk of taking this medication as well as not taking any other nonsteroidal anti-inflammatory medications while taking this medication; counseled thoroughly regarding potential risk of Cardiovascular injury, Kidney injury, Gastrointestinal ulceration/bleeding. Patient voiced understanding  -script provided for formal physical therapy for lumbar facet arthropathy, radiculopathy; Physician directed home exercise plan as per [facility] demonstrated and handouts provided that patient plans to participate with for 1 hour, twice a week for the next 6 weeks. There are risks associated with opioid medications, including dependence, addiction and tolerance. The patient understands and agrees to use these medications only as prescribed. Potential side effects of the medications include, but are not limited to, constipation, drowsiness, addiction, impaired judgment and risk of fatal overdose if not taken as prescribed. The patient was warned against driving while taking sedation medications or operating heavy machinery. The patient voiced understanding. Sharing medications is a felony. At this point in time, the patient is showing no signs of addiction, abuse, diversion or suicidal ideation.      [place] Prescription Drug Monitoring Program report was reviewed and was appropriate      Complete risks and benefits including bleeding, infection, tissue reaction, nerve injury and allergic reaction were discussed. The approach was demonstrated using models and literature was provided. Verbal and written consent was obtained. My impressions and treatment recommendations were discussed in detail with the patient who verbalized understanding and had no further questions. Discharge instructions were provided. I personally saw and examined the patient and I agree with the above discussed plan of care. New Medications Ordered This Visit   Medications   • Coenzyme Q10 (CO Q 10 PO)     Sig: Take by mouth   • gabapentin (NEURONTIN) 300 mg capsule     Sig: Take 1 capsule (300 mg total) by mouth 3 (three) times a day     Dispense:  90 capsule     Refill:  2       History of Present Illness    Greater than 90% relief of pain with improved ability to participate with IADLs after Bilateral L3, L4, L5 medial branch blocks #1 and #2 and with subsequent radiofrequency lesioning of medial branch nerves performed 6/15/23; describes now proximal radicular features in left L4 and L5 dermatome and shopping cart sign. No significant relief with recent ILESI at L5-S1. Previously reported the following symptomatology:    Douglas Olmstead is a 79 y.o. female with pmhx o fprior cerebral aneurysm, XOL, obesity, presenting with chronic low back pain described primarily as arthritic in nature. She describes 8/10 low back pain that is worse in the mornings and worse at the end of the day. The pain is characterized by achy, nagging, indolent, crampy, stabbing pain in her axial low back. The patient describes that the pain is worse with standing for long periods of time on hard surfaces as well as with walking. The patient is a very active individual and feels as though this pain compromises his participation with independent activities of daily living.  The pain can be debilitating at times and contribute to significant disability, compromising overall activity and independent activities of daily living. She has not yet tried PT for her pain. Medications the patient has tried in the past include nsaids, tylenol. She describes no radicular symptoms and has good strength. She denies any weakness numbness or paresthesias. The patient denies any bowel or bladder dysfunction, saddle anesthesia or gait instability as well. I have personally reviewed and/or updated the patient's past medical history, past surgical history, family history, social history, current medications, allergies, and vital signs today. Review of Systems   Constitutional: Positive for activity change. HENT: Negative. Eyes: Negative. Respiratory: Negative. Cardiovascular: Negative. Gastrointestinal: Negative. Endocrine: Negative. Genitourinary: Negative. Musculoskeletal: Positive for arthralgias, back pain and myalgias. Negative for gait problem. Skin: Negative. Allergic/Immunologic: Negative. Neurological: Negative for weakness and numbness. Hematological: Negative. Psychiatric/Behavioral: Negative. All other systems reviewed and are negative.       Patient Active Problem List   Diagnosis   • Chest pain   • Hyperlipidemia   • History of cerebral aneurysm repair   • Tobacco abuse   • Lumbar spondylosis   • Spinal stenosis of lumbar region with neurogenic claudication   • Lumbar radiculopathy       Past Medical History:   Diagnosis Date   • Anxiety    • Cerebral aneurysm    • Hyperlipidemia        Past Surgical History:   Procedure Laterality Date   • BREAST CYST ASPIRATION Right 09/02/2022    cyst that abscessed   • CARDIAC CATHETERIZATION      2005   • CEREBRAL ANEURYSM REPAIR  07/1997    at [place]   • CHOLECYSTECTOMY     • EPIDURAL BLOCK INJECTION N/A 8/3/2023    Procedure: L5-S1 ILESI;  Surgeon: Rima Pritchard MD;  Location: [facility];  Service: Pain Management    • NERVE BLOCK Bilateral 5/11/2023    Procedure: BLOCK MEDIAL BRANCH L3, L4, L5 #1;  Surgeon: Viktoria Wilson MD;  Location: [facility];  Service: Pain Management    • NERVE BLOCK Bilateral 5/30/2023    Procedure: BLOCK MEDIAL BRANCH L3, L4, L5 #2;  Surgeon: Viktoria Wilson MD;  Location: [facility];  Service: Pain Management    • RHIZOTOMY Bilateral 6/15/2023    Procedure: RHIZOTOMY LUMBAR Medial branch nerves L3, L4, L5;  Surgeon: Viktoria Wilson MD;  Location: [facility];  Service: Pain Management        Family History   Problem Relation Age of Onset   • No Known Problems Mother    • Prostate cancer Father    • No Known Problems Daughter    • No Known Problems Daughter    • No Known Problems Maternal Grandmother    • No Known Problems Maternal Grandfather    • No Known Problems Paternal Grandmother    • No Known Problems Paternal Grandfather    • No Known Problems Paternal Aunt        Social History     Occupational History   • Not on file   Tobacco Use   • Smoking status: Every Day     Packs/day: 0.25     Years: 25.00     Total pack years: 6.25     Types: Cigarettes   • Smokeless tobacco: Never   Substance and Sexual Activity   • Alcohol use: Never   • Drug use: Never   • Sexual activity: Not on file       Current Outpatient Medications on File Prior to Visit   Medication Sig   • ALPRAZolam (XANAX) 1 mg tablet Pt states, takes for anxiety prn   • B Complex Vitamins (VITAMIN B COMPLEX) TABS Take 2 tablets by mouth daily    • celecoxib (CeleBREX) 200 mg capsule    • Cholecalciferol 25 MCG (1000 UT) capsule Take 1,000 Units by mouth daily    • Coenzyme Q10 (CO Q 10 PO) Take by mouth   • FLUoxetine (PROzac) 40 MG capsule Take by mouth daily    • lansoprazole (PREVACID) 30 mg capsule Take by mouth daily    • Multiple Vitamins-Minerals (CENTRUM SILVER ULTRA WOMENS) TABS Take 1 tablet by mouth daily    • Omega-3 Fatty Acids (OMEGA-3 FISH OIL) 1000 MG CAPS Take 1 capsule by mouth daily   • Progesterone 200 MG CAPS Take 1 capsule by mouth every evening   • ramelteon (ROZEREM) 8 mg tablet 8 mg daily at bedtime    • simvastatin (ZOCOR) 40 mg tablet Take 40 mg by mouth daily at bedtime    • tiZANidine (ZANAFLEX) 4 mg tablet Take 1 tablet by mouth every 6 (six) hours as needed   • traZODone (DESYREL) 50 mg tablet Take 50 mg by mouth daily at bedtime    • valACYclovir (VALTREX) 1,000 mg tablet TAKE 2 TABLETS BY MOUTH EVERY 12 HOURS AS DIRECTED   • vitamin A 10,000 units capsule Take 10,000 Units by mouth daily    • vitamin E, tocopherol, 400 units capsule Take 400 Units by mouth daily      No current facility-administered medications on file prior to visit. Allergies   Allergen Reactions   • Zolpidem Other (See Comments)     Felt like speeding. • Phenylephrine-Acetaminophen Other (See Comments)     Heart races     Physical Exam    /98 (BP Location: Left arm, Patient Position: Sitting, Cuff Size: Adult)   Pulse 72   Temp 97.6 °F (36.4 °C)   Ht 5' 7" (1.702 m)   Wt 95.3 kg (210 lb)   SpO2 96%   BMI 32.89 kg/m²     Constitutional: normal, well developed, well nourished, alert, in no distress and non-toxic and no overt pain behavior. and obese  Eyes: anicteric  HEENT: grossly intact  Neck: supple, symmetric, trachea midline and no masses   Pulmonary:even and unlabored  Cardiovascular:No edema or pitting edema present  Skin:Normal without rashes or lesions and well hydrated  Psychiatric:Mood and affect appropriate  Neurologic:Cranial Nerves II-XII grossly intact Sensation grossly intact; no clonus negative morales's. Reflexes 2+ and brisk. SLR negative bilaterally. Musculoskeletal:normal gait. 5/5 strength bilaterally with AROM in lower extremities. Normal heel toe and tip toe walking. Significant pain with lumbar facet loading bilaterally and with lateral spine rotation. ttp over lumbar paraspinal muscles. Negative alyson's test, negative gaenslen's negative SIJ loading bilaterally.     Imaging    MRI LUMBAR SPINE WITHOUT CONTRAST     INDICATION: M47.816: Spondylosis without myelopathy or radiculopathy, lumbar region  M54.16: Radiculopathy, lumbar region.     COMPARISON:  None.     TECHNIQUE:  Multiplanar, multisequence imaging of the lumbar spine was performed. .        IMAGE QUALITY:  Diagnostic     FINDINGS:     VERTEBRAL BODIES:  There are 5 lumbar type vertebral bodies. There is preserved normal lumbar lordosis. Minimal retrolisthesis at L5-S1.     There is Modic type I endplate degenerative signal change at L1-2. Modic type II endplate degenerative signal change at levels L4-5 and L5-S1. Scattered endplate Schmorl's nodes.     SACRUM:  Normal signal within the sacrum. No evidence of insufficiency or stress fracture.     DISTAL CORD AND CONUS:  Normal size and signal within the distal cord and conus.     PARASPINAL SOFT TISSUES:  Paraspinal soft tissues are unremarkable.     LOWER THORACIC DISC SPACES: Mild noncompressive lower thoracic degenerative change.     LUMBAR DISC SPACES:     L1-L2: Disc bulge. There is left foraminal disc protrusion. Moderate bilateral facet arthropathy and ligamentum flavum thickening. Mild canal stenosis. Mild left foraminal narrowing.     L2-L3: Disc bulge. Moderate facet arthropathy. Mild canal stenosis. Mild bilateral foraminal narrowing.     L3-L4: Minimal disc bulge. Moderate facet arthropathy. Minor canal narrowing. No significant foraminal stenosis.     L4-L5: Disc bulge. Moderate bilateral facet arthropathy. Bilateral ligamentum flavum thickening. Moderate bilateral lateral recess narrowing and mild canal stenosis. Mild bilateral foraminal narrowing.     L5-S1: Disc bulge. There is central disc protrusion. Severe bilateral facet arthropathy. There is bilateral lateral recess stenosis impacting S1 nerve roots. Mild canal stenosis.  Mild bilateral foraminal narrowing.     OTHER FINDINGS: Indeterminate subcentimeter left renal T2 hyperintense cortical lesion, statistically favored to represent benign cyst.     IMPRESSION:     1.  Multilevel degenerative change without high-grade canal or foraminal stenosis as detailed, worse at level L5-S1 with bilateral lateral recess stenosis impacting S1 nerve roots. Correlate for S1 radiculopathy. 2.  Moderate bilateral lateral recess narrowing at L4-5. Torrey Epps MD - 02/06/2023   Formatting of this note might be different from the original.   EXAM   XR L SPINE AP AND LATERAL-   2/3/2023 3:18 pm     HISTORY   Provided clinical history: "worsening low back pain Lumbar DDD"     TECHNIQUE   Three views of the lumbar spine were obtained. COMPARISON   Radiographs dated March 11, 2014. FINDINGS   Five lumbar-type vertebral bodies.  Multilevel intervertebral disc degeneration, moderate-to-severe at L1-2, L2-3, L4-5, and L5-S1.  Multilevel facet osteoarthritis, greatest near the lumbosacral junction. No fracture or aggressive osseous lesion identified.  Mild-to-moderate osteoarthritis of both hips.  Surgical clips in the right upper abdomen. IMPRESSION   IMPRESSION   Degenerative findings as above.

## 2023-08-30 NOTE — PATIENT INSTRUCTIONS
Core Strengthening Exercises   WHAT YOU NEED TO KNOW:   Your core includes the muscles of your lower back, hip, pelvis, and abdomen. Core strengthening exercises help heal and strengthen these muscles. This helps prevent another injury, and keeps your pelvis, spine, and hips in the correct position. DISCHARGE INSTRUCTIONS:   Call your doctor or physical therapist if:   You have sharp or worsening pain during exercise or at rest.    You have questions or concerns about your shoulder exercises. Safety tips:  Talk to your healthcare provider before you start an exercise program. A physical therapist can teach you how to do core strengthening exercises safely. Do the exercises on a mat or firm surface. A firm surface will support your spine and prevent low back pain. Do not do these exercises on a bed. Move slowly and smoothly. Avoid fast or jerky motions. Stop if you feel pain. You may feel some discomfort at first, but you should not feel pain. Tell your provider or physical therapist if you have pain while you exercise. Regular exercise will help decrease your discomfort over time. Breathe normally during core exercises. Do not hold your breath. This may cause an increase in blood pressure and prevent muscle strengthening. Your healthcare provider will tell you when to inhale and exhale during the exercise. Begin all of your exercises with abdominal bracing. Abdominal bracing helps warm up your core muscles. You can also practice abdominal bracing throughout the day. Lie on your back with your knees bent and feet flat on the floor. Place your arms in a relaxed position beside your body. Tighten your abdominal muscles. Pull your belly button in and up toward your spine. Hold for 5 seconds. Relax your muscles. Repeat 10 times. Core strengthening exercises: Your healthcare provider will tell you how often to do these exercises.  The provider will also tell you how many repetitions of each exercise you should do. Hold each exercise for 5 seconds or as directed. As you get stronger, increase your hold to 10 to 15 seconds. You can do some of these exercises on a stability ball, or with a weight. Ask your healthcare provider how to use a stability ball or weight for these exercises:  Bridging:  Lie on your back with your knees bent and feet flat on the floor. Rest your arms at your side. Tighten your buttocks, and then lift your hips 1 inch off the floor. Hold for 5 seconds. When you can do this exercise without pain for 10 seconds, increase the distance you lift your hips. A good goal is to be able to lift your hips so that your shoulders, hips, and knees are in a straight line. Dead bug:  Lie on your back with your knees bent and feet flat on the floor. Place your arms in a relaxed position beside your body. Begin with abdominal bracing. Next, raise one leg, keeping your knee bent. Hold for 5 seconds. Repeat with the other leg. When you can do this exercise without pain for 10 to 15 seconds, you may raise one straight leg and hold. Repeat with the other leg. Quadruped:  Place your hands and knees on the floor. Keep your wrists directly below your shoulders and your knees directly below your hips. Pull your belly button in toward your spine. Do not flatten or arch your back. Tighten your abdominal muscles below your belly button. Hold for 5 seconds. When you can do this exercise without pain for 10 to 15 seconds, you may extend one arm and hold. Repeat on the other side. Side bridge exercises:      Standing side bridge:  Stand next to a wall and extend one arm toward the wall. Place your palm flat on the wall with your fingers pointing upward. Begin with abdominal bracing. Next, without moving your feet, slowly bend your arm to 90 degrees. Hold for 5 seconds. Repeat on the other side.  When you can do this exercise without pain for 10 to 15 seconds, you may do the bent leg side bridge on the floor. Bent leg side bridge:  Lie on one side with your legs, hips, and shoulders in a straight line. Prop yourself up onto your forearm so your elbow is directly below your shoulder. Bend your knees back to 90 degrees. Begin with abdominal bracing. Next, lift your hips and balance yourself on your forearm and knees. Hold for 5 seconds. Repeat on the other side. When you can do this exercise without pain for 10 to 15 seconds, you may do the straight leg side bridge on the floor. Straight leg side bridge:  Lie on one side with your legs, hips, and shoulders in a straight line. Prop yourself up onto your forearm so your elbow is directly below your shoulder. Begin with abdominal bracing. Lift your hips off the floor and balance yourself on your forearm and the outside of your flexed foot. Do not let your ankle bend sideways. Hold for 5 seconds. Repeat on the other side. When you can do this exercise without pain for 10 to 15 seconds, ask your healthcare provider for more advanced exercises. Superman:  Lie on your stomach. Extend your arms forward on the floor. Tighten your abdominal muscles and lift your right hand and left leg off the floor. Hold this position. Slowly return to the starting position. Tighten your abdominal muscles and lift your left hand and right leg off the floor. Hold this position. Slowly return to the starting position. Clam:  Lie on your side with your knees bent. Put your bottom arm under your head to keep your neck in line. Put your top hand on your hip to keep your pelvis from moving. Put your heels together, and keep them together during this exercise. Slowly raise your top knee toward the ceiling. Then lower your leg so your knees are together. Repeat this exercise 10 times. Then switch sides and do the exercise 10 times with the other leg. Curl up:  Lie on your back with your knees bent and feet flat on the floor.  Place your hands, palms down, underneath your lower back. Next, with your elbows on the floor, lift your shoulders and chest 2 to 3 inches off the floor. Keep your head in line with your shoulders. Hold this position. Slowly return to the starting position. Straight leg raises:  Lie on your back with one leg straight. Bend the other knee and place your foot flat on the floor. Tighten your abdominal muscles. Keep your leg straight and slowly lift it straight up 6 to 12 inches off the floor. Hold this position. Lower your leg slowly. Do as many repetitions as directed on this side. Repeat with the other leg. Plank:  Lie on your stomach. Bend your elbows and place your forearms flat on the floor. Lift your chest, stomach, and knees off the floor. Make sure your elbows are below your shoulders. Your body should be in a straight line. Do not let your hips or lower back sink to the ground. Squeeze your abdominal muscles together and hold for 15 seconds. To make this exercise harder, hold for 30 seconds or lift 1 leg at a time. Bicycles:  Lie on your back. Bend both knees and bring them toward your chest. Your calves should be parallel to the floor. Place the palms of your hands on the back of your head. Straighten your right leg and keep it lifted 2 inches off the floor. Raise your head and shoulders off the floor and twist towards your left. Keep your head and shoulders lifted. Bend your right knee while you straighten your left leg. Keep your left leg 2 inches off the floor. Twist your head and chest towards the left leg. Continue to straighten 1 leg at a time and twist.       Follow up with your doctor or physical therapist as directed:  Write down your questions so you remember to ask them during your visits. © Copyright [employer]ón 2022 Information is for End User's use only and may not be sold, redistributed or otherwise used for commercial purposes. The above information is an  only.  It is not intended as medical advice for individual conditions or treatments. Talk to your doctor, nurse or pharmacist before following any medical regimen to see if it is safe and effective for you.

## 2023-08-30 NOTE — LETTER
August 30, 2023     Patient: Douglas Olmstead  YOB: 1955  Date of Visit: 8/30/2023      To Whom it May Concern:    Bella Chan is under my professional care. Brook Lopez was seen in my office on 8/30/2023. It is recommended for Brook Lopez to wear sneakers for a medical condition at her place of work. If you have any questions or concerns, please don't hesitate to call.          Sincerely,          Barbara Patricia MD        CC:   No Recipients

## 2023-08-31 ENCOUNTER — OFFICE VISIT (OUTPATIENT)
Dept: PHYSICAL THERAPY | Facility: CLINIC | Age: 68
End: 2023-08-31
Payer: MEDICARE

## 2023-08-31 DIAGNOSIS — M54.16 LUMBAR RADICULOPATHY: ICD-10-CM

## 2023-08-31 DIAGNOSIS — M47.816 LUMBAR SPONDYLOSIS: Primary | ICD-10-CM

## 2023-08-31 PROCEDURE — 97110 THERAPEUTIC EXERCISES: CPT | Performed by: PHYSICAL THERAPIST

## 2023-08-31 PROCEDURE — 97140 MANUAL THERAPY 1/> REGIONS: CPT | Performed by: PHYSICAL THERAPIST

## 2023-08-31 NOTE — PROGRESS NOTES
Daily Note     Today's date: 2023  Patient name: Mayra Mendez  : 1955  MRN: [id number]  Referring provider: Raad Nash MD  Dx:   Encounter Diagnosis     ICD-10-CM    1. Lumbar spondylosis - Bilateral  M47.816       2. Lumbar radiculopathy  M54.16                      Subjective: Patient notes that she did begin use of gabapentin yesterday. She notes that feels "not bad" today. Will undergo another a new epidural      Objective: See treatment diary below      Assessment: Tolerated treatment well. Patient would benefit from continued PT. Patient is progressing toward goals for flexibility and core strengthening however pain symptoms do persist daily. Minimal tenderness in the left SI joint area this date. Plan: Continue per plan of care.       Precautions: None     Daily Treatment Diary:      Initial Evaluation Date: 08/15/23  Compliance 8/15  8/17  8/22  8/24  8/29  8/31           Visit Number 1 2  3  4  5  6           Re-Eval  IE                 [facility]   Foto Captured Y                           8/15  8/17  8/22  8/24  8/29  8/31           Manual                      L/S PA glides 5 min  5min  5 min  5 min  5m CD  8 min           L/S STM/ DTM 5 min 10 min  10 min  10 min  and L piri release 12m  7 min                                 Ther-Ex                      LTR 10" x3  5"x10  5" x10  5" x10  5"x10  5" x10           SKTC stretch 10" x3  5"x5  10" x5  held  5"x5  5" x5           Piriformis stretch 10" x3  15"x3  10"  held 10"x3  20" x3           Open book stretch ---> 5"x10 ea 5" x10 5" x10 5" x10 5" x10       TA set x10    -  x10    5" x10           TA with PPT x10  5"x15  -  -  5"x10  5" x10           TA with march --->  x15  -  -  x15  x15           TA with SLR --->  2x10  -  1x10  10x ea  10x ea           TA bridge --->  15x  -  -               SL clamshells  15x ea - 15x ea 2x10 ea 2x10 ea       Seated p-ball roll outs --->    -  -               Nu- step --->  L3 7 min  L3 7 min (left knee pain)  -               Neuro Re-Ed                      Prone hip ext -pillow under abdomen --->                                                                         Ther-Act              STS                      Step ups                           Modalities                      HP/ CP PRN  10' CP 10'  And TENS 10' post Declined

## 2023-09-05 ENCOUNTER — OFFICE VISIT (OUTPATIENT)
Dept: PHYSICAL THERAPY | Facility: CLINIC | Age: 68
End: 2023-09-05
Payer: MEDICARE

## 2023-09-05 ENCOUNTER — TELEPHONE (OUTPATIENT)
Age: 68
End: 2023-09-05

## 2023-09-05 DIAGNOSIS — M47.816 LUMBAR SPONDYLOSIS: Primary | ICD-10-CM

## 2023-09-05 DIAGNOSIS — M54.16 LUMBAR RADICULOPATHY: ICD-10-CM

## 2023-09-05 PROCEDURE — 97140 MANUAL THERAPY 1/> REGIONS: CPT

## 2023-09-05 PROCEDURE — 97110 THERAPEUTIC EXERCISES: CPT

## 2023-09-05 NOTE — TELEPHONE ENCOUNTER
Attempted to reach pt, LMOM with CB# and OH.      Hollis Avila MD  You 25 minutes ago (11:22 AM)       Will be ok      You  Hollis Avila MD 29 minutes ago (11:17 AM)     VO  Please review and advise, TY.  Pt scheduled 9/21 Spinal stenosis of lumbar region with neurogenic claudication.

## 2023-09-05 NOTE — PROGRESS NOTES
Daily Note     Today's date: 2023  Patient name: Wil Castellanos   : 1955  MRN: [id number]  Referring provider: Mariza Regalado MD  Dx:   Encounter Diagnosis     ICD-10-CM    1. Lumbar spondylosis - Bilateral  M47.816       2. Lumbar radiculopathy  M54.16                      Subjective: Patient reports L side buttock region and low back pain is about a 4/10 at start of session. She is unsure if she has had any improvement since beginning the gabapentin. She says she has had increased pain but this may have just been muscular. Objective: See treatment diary below      Assessment: Tolerated treatment well without significant increase in low back discomfort. Patient's activity tolerance remains limited by pre-existing knee pain. Significant relief with piriformis release. Patient would benefit from continued PT to increase trunk mobility and core strength for improved function in ADLs. Plan: Continue per plan of care.       Precautions: None     Daily Treatment Diary:      Initial Evaluation Date: 08/15/23  Compliance 8/15  8/17  8/22  8/24  8/29  8/31 9/5         Visit Number 1 2  3  4  5  6  7         Re-Eval  IE                 [facility]   Foto Captured Y                           8/15  8/17  8/22  8/24  8/29  8/31  9/5         Manual                      L/S PA glides 5 min  5min  5 min  5 min  5m CD  8 min  5 m CD         L/S STM/ DTM 5 min 10 min  10 min  10 min  and L piri release 12m  7 min  10min                               Ther-Ex                      LTR 10" x3  5"x10  5" x10  5" x10  5"x10  5" x10  5"x10         SKTC stretch 10" x3  5"x5  10" x5  held  5"x5  5" x5  5"x5         Piriformis stretch 10" x3  15"x3  10"  held 10"x3  20" x3  20"x3         Open book stretch ---> 5"x10 ea 5" x10 5" x10 5" x10 5" x10 5"x10      TA set x10    -  x10    5" x10  5"x15         TA with PPT x10  5"x15  -  -  5"x10  5" x10  5"x15         TA with march --->  x15  -  -  x15  x15  x15         TA with GREERR --->  2x10  -  1x10  10x ea  10x ea  2x10 ea         TA bridge --->  15x  -  -               SL clamshells  15x ea - 15x ea 2x10 ea 2x10 ea 2x10 ea      Seated p-ball roll outs --->    -  -               Nu- step --->  L3 7 min  L3 7 min (left knee pain)  -               Neuro Re-Ed                      Prone hip ext -pillow under abdomen --->                                                                         Ther-Act              STS                      Step ups                           Modalities                      HP/ CP PRN  10' CP 10'  And TENS 10' post Declined

## 2023-09-05 NOTE — TELEPHONE ENCOUNTER
Caller: Cecilia Tamayo    Doctor: Dr Avila    Reason for call: Patient calling stating will be getting injection for hand and ankle and would like to know if is going to be ok with procedure she is getting done with pain management please advise     Call back#: [phone]

## 2023-09-19 ENCOUNTER — APPOINTMENT (OUTPATIENT)
Dept: PHYSICAL THERAPY | Facility: CLINIC | Age: 68
End: 2023-09-19
Payer: MEDICARE

## 2023-09-20 ENCOUNTER — APPOINTMENT (OUTPATIENT)
Dept: PHYSICAL THERAPY | Facility: CLINIC | Age: 68
End: 2023-09-20
Payer: MEDICARE

## 2023-09-20 ENCOUNTER — TELEPHONE (OUTPATIENT)
Age: 68
End: 2023-09-20

## 2023-09-20 NOTE — DISCHARGE INSTR - AVS FIRST PAGE
YOUR 2 WEEK FOLLOW UP HAS BEEN SCHEDULED; IF YOU WISH TO CHANGE THE FOLLOW UP, PLEASE CALL THE [facility] [place]: [phone]            Epidural Steroid Injection   WHAT YOU NEED TO KNOW:   An epidural steroid injection (VENICE) is a procedure to inject steroid medicine into the epidural space. The epidural space is between your spinal cord and vertebrae. Steroids reduce inflammation and fluid buildup in your spine that may be causing pain. You may be given pain medicine along with the steroids. DISCHARGE INSTRUCTIONS:   Call your local emergency number (911 in the [address]) if:   You have a seizure. You have trouble moving your legs. Seek care immediately if:   Blood soaks through your bandage. You have a fever or chills, severe back pain, and the procedure area is sensitive to the touch. You cannot control when you urinate or have a bowel movement. Call your doctor if:   You have weakness or numbness in your legs. Your wound is red, swollen, or draining pus. You have nausea or are vomiting. Your face or neck is red and you feel warm. You have more pain than you had before the procedure. You have swelling in your hands or feet. You have questions or concerns about your condition or care. Care for your wound as directed: You may remove the bandage before you go to bed the day of your procedure. You may take a shower, but do not take a bath for at least 24 hours. Self-care:   Do not drive,  use machines, or do strenuous activity for 24 hours after your procedure or as directed. Continue other treatments  as directed. Steroid injections alone will not control your pain. The injections are meant to be used with other treatments, such as physical therapy. Follow up with your doctor as directed:  Write down your questions so you remember to ask them during your visits.    © Copyright [employer]  Information is for End User's use only and may not be sold, redistributed or otherwise used for commercial purposes. The above information is an  only. It is not intended as medical advice for individual conditions or treatments. Talk to your doctor, nurse or pharmacist before following any medical regimen to see if it is safe and effective for you.

## 2023-09-20 NOTE — TELEPHONE ENCOUNTER
Pt would like to know what time should she arrive before her procedure tomorrow  With Dr. Adria Bingham.       Please advise      Pt can be reached at [phone]

## 2023-09-20 NOTE — TELEPHONE ENCOUNTER
Called and spoke to patient and explained that the hospital will call her between 2-4 and let her know what time to arrive.

## 2023-09-21 ENCOUNTER — APPOINTMENT (OUTPATIENT)
Dept: RADIOLOGY | Facility: HOSPITAL | Age: 68
End: 2023-09-21
Payer: MEDICARE

## 2023-09-21 ENCOUNTER — HOSPITAL ENCOUNTER (OUTPATIENT)
Facility: HOSPITAL | Age: 68
Setting detail: OUTPATIENT SURGERY
Discharge: HOME | End: 2023-09-21
Attending: ANESTHESIOLOGY | Admitting: ANESTHESIOLOGY
Payer: MEDICARE

## 2023-09-21 VITALS
DIASTOLIC BLOOD PRESSURE: 81 MMHG | SYSTOLIC BLOOD PRESSURE: 143 MMHG | TEMPERATURE: 97.7 F | RESPIRATION RATE: 18 BRPM | OXYGEN SATURATION: 95 % | HEART RATE: 72 BPM

## 2023-09-21 PROCEDURE — 62323 NJX INTERLAMINAR LMBR/SAC: CPT | Performed by: ANESTHESIOLOGY

## 2023-09-21 RX ORDER — SODIUM CHLORIDE 9 MG/ML
INJECTION INTRAVENOUS AS NEEDED
Status: DISCONTINUED | OUTPATIENT
Start: 2023-09-21 | End: 2023-09-21 | Stop reason: HOSPADM

## 2023-09-21 RX ORDER — LIDOCAINE HYDROCHLORIDE 10 MG/ML
INJECTION, SOLUTION EPIDURAL; INFILTRATION; INTRACAUDAL; PERINEURAL AS NEEDED
Status: DISCONTINUED | OUTPATIENT
Start: 2023-09-21 | End: 2023-09-21 | Stop reason: HOSPADM

## 2023-09-21 RX ORDER — METHYLPREDNISOLONE ACETATE 80 MG/ML
INJECTION, SUSPENSION INTRA-ARTICULAR; INTRALESIONAL; INTRAMUSCULAR; SOFT TISSUE AS NEEDED
Status: DISCONTINUED | OUTPATIENT
Start: 2023-09-21 | End: 2023-09-21 | Stop reason: HOSPADM

## 2023-09-21 NOTE — OP NOTE
OPERATIVE REPORT  PATIENT NAME: Jonathan Vieyra    :  1955  MRN: [id number]  Pt Location:  GI ROOM 01    SURGERY DATE: 2023    Surgeon(s) and Role: Kiersten Erazo MD - Primary    Preop Diagnosis:  Spinal stenosis of lumbar region with neurogenic claudication [M48.062]  Lumbar radiculopathy [M54.16]    Post-Op Diagnosis Codes:     * Spinal stenosis of lumbar region with neurogenic claudication [M48.062]     * Lumbar radiculopathy [M54.16]    Procedure(s):  BLOCK / INJECTION CAUDAL    Specimen(s):  * No specimens in log *    Estimated Blood Loss:   Minimal    Drains:  * No LDAs found *    Anesthesia Type:   Local    Operative Indications:  Spinal stenosis of lumbar region with neurogenic claudication [M48.062]  Lumbar radiculopathy [M54.16]    Operative Findings:  Caudal Epidurogram    Complications:   None    Procedure and Technique:  Please see detailed procedure note    I was present for the entire procedure.     Patient Disposition:  PACU     SIGNATURE: Kiersten Erazo MD  DATE: 2023  TIME: 7:49 AM

## 2023-09-21 NOTE — PROCEDURES
Pre-procedure Diagnosis:       Lumbar Radiculopathy  Post-procedure Diagnosis:     Lumbar Radiculopathy  Procedure Title(s):                  1. Caudal epidural steroid injection                                                  2. Intraoperative fluoroscopy  Attending Surgeon:                 Pantera Otoole MD  Anesthesia:                             Local      Indications: The patient is a 79 y.o. female with a diagnosis of Lumbar Radiculopathy. The patient's history and physical exam were reviewed. The risks, benefits and alternatives to the procedure were discussed, and all questions were answered to the patient's satisfaction. The patient agreed to proceed, and written informed consent was obtained. Procedure in Detail: The patient was brought into the procedure room and placed in the prone position on the fluoroscopy table. The area of the lumbar spine was prepped with chlorhexidine gluconate solution times one and draped in a sterile manner. The sacral hiatus was identified and marked under AP fluoroscopy. The skin and subcutaneous tissues in the area were anesthetized with 1% lidocaine. A 22-gauge 3.5 inch spinal needle was directed toward the sacrococcygeal interspace under fluoroscopic guidance until it was penetrated. Needle passed to S4 level;  2 ml Omnipaque 240 contrast solution was injected. An appropriate epidurogram was noted. Then, after negative aspiration, a solution consisting of 1-mL depo-medrol (80mg/mL) and 9-mL preservative-free saline was easily injected. The needle was removed with a 1% lidocaine flush. The patient's back was cleaned and a bandage was placed over the site of needle insertion. Disposition: The patient tolerated the procedure well, and there were no apparent complications. The patient was taken to the recovery area where written discharge instructions for the procedure were given.       Estimated Blood Loss: None  Specimens Obtained: N/A

## 2023-09-21 NOTE — INTERVAL H&P NOTE
H&P reviewed. After examining the patient I find no changes in the patients condition since the H&P had been written.     Vitals:    09/21/23 0723   BP: 143/85   Pulse: 78   Resp: 20   Temp: 98.4 °F (36.9 °C)   SpO2: 96%

## 2023-09-25 NOTE — PROGRESS NOTES
Daily Note     Today's date: 2023  Patient name: Jaxon Schultz  : 1955  MRN: [id number]  Referring provider: Harleen Carballo MD  Dx:   Encounter Diagnosis     ICD-10-CM    1. Lumbar spondylosis - Bilateral  M47.816       2. Lumbar radiculopathy  M54.16                      Subjective: The patient states that she started taking Gabapentin about three weeks ago and she feels about 90% better since starting this. She got a shot last Thursday. She will be going back to see the doctor on 10/11 for her follow up appointment. She was referred to see a neurosurgeon, has an appointment for November. Objective: See treatment diary below      Assessment: Today is her first appointment since 23. Tolerated treatment well. Able to progress patient with TE as outlined below in daily treatment diary. No increase in pain noted during session. Muscle tightness is noted along lumbar PVMs and L piriformis with MT, reports feeling looser at end of session. Patient demonstrated fatigue post treatment and would benefit from continued PT      Plan: Continue per plan of care.       Precautions: None     Daily Treatment Diary:      Initial Evaluation Date: 08/15/23  Compliance 8/15  8/17  8/22  8/24  8/29  8/31 9/5  9/26       Visit Number 1 2  3  4  5  6  7  8       Re-Eval  IE                 [facility]   Foto Captured Y                           8/15  8/17  8/22  8/24  8/29  8/31  9/5  9/26       Manual                      L/S PA glides 5 min  5min  5 min  5 min  5m CD  8 min  5 m CD  5 min       L/S STM/ DTM 5 min 10 min  10 min  10 min  and L piri release 12m  7 min  10min  10 min                             Ther-Ex                      LTR 10" x3  5"x10  5" x10  5" x10  5"x10  5" x10  5"x10  5"x10       SKTC stretch 10" x3  5"x5  10" x5  held  5"x5  5" x5  5"x5  5"x5       Piriformis stretch 10" x3  15"x3  10"  held 10"x3  20" x3  20"x3  20"x3       Open book stretch ---> 5"x10 ea 5" x10 5" x10 5" x10 5" x10 5"x10 5"x10     TA set x10    -  x10    5" x10  5"x15  5"x15       TA with PPT x10  5"x15  -  -  5"x10  5" x10  5"x15  5"x15       TA with march --->  x15  -  -  x15  x15  x15 NP       TA with SLR --->  2x10  -  1x10  10x ea  10x ea  2x10 ea  2x10 ea       TA bridge --->  15x  -  -       2x10       SL clamshells  15x ea - 15x ea 2x10 ea 2x10 ea 2x10 ea 2x10 ea     Reverse Clamshells        Eddie 2x10     S/L Hip Abd        Eddie 2x10     Dead Bug        10x  UE & LE     Palloff Press         GTB 5"x10BIl     Seated p-ball roll outs --->    -  -               Nu- step --->  L3 7 min  L3 7 min (left knee pain)  -               Neuro Re-Ed                      Prone hip ext -pillow under abdomen --->                                                                         Ther-Act              STS                      Step ups             Resisted Walking at [employer]              Modalities                      HP/ CP PRN  10' CP 10'  And TENS 10' post Declined

## 2023-09-26 ENCOUNTER — OFFICE VISIT (OUTPATIENT)
Dept: PHYSICAL THERAPY | Facility: CLINIC | Age: 68
End: 2023-09-26
Payer: MEDICARE

## 2023-09-26 DIAGNOSIS — M47.816 LUMBAR SPONDYLOSIS: Primary | ICD-10-CM

## 2023-09-26 DIAGNOSIS — M54.16 LUMBAR RADICULOPATHY: ICD-10-CM

## 2023-09-26 PROCEDURE — 97112 NEUROMUSCULAR REEDUCATION: CPT | Performed by: PHYSICAL THERAPIST

## 2023-09-26 PROCEDURE — 97140 MANUAL THERAPY 1/> REGIONS: CPT | Performed by: PHYSICAL THERAPIST

## 2023-09-26 PROCEDURE — 97110 THERAPEUTIC EXERCISES: CPT | Performed by: PHYSICAL THERAPIST

## 2023-09-28 ENCOUNTER — TELEPHONE (OUTPATIENT)
Dept: PAIN MEDICINE | Facility: CLINIC | Age: 68
End: 2023-09-28

## 2023-09-28 ENCOUNTER — OFFICE VISIT (OUTPATIENT)
Dept: PHYSICAL THERAPY | Facility: CLINIC | Age: 68
End: 2023-09-28
Payer: MEDICARE

## 2023-09-28 DIAGNOSIS — M54.16 LUMBAR RADICULOPATHY: ICD-10-CM

## 2023-09-28 DIAGNOSIS — M47.816 LUMBAR SPONDYLOSIS: Primary | ICD-10-CM

## 2023-09-28 PROCEDURE — 97110 THERAPEUTIC EXERCISES: CPT | Performed by: PHYSICAL THERAPIST

## 2023-09-28 PROCEDURE — 97140 MANUAL THERAPY 1/> REGIONS: CPT | Performed by: PHYSICAL THERAPIST

## 2023-09-28 NOTE — PROGRESS NOTES
Daily Note     Today's date: 2023  Patient name: Hima Arellano  : 1955  MRN: [id number]  Referring provider: Rima Pritchard MD  Dx:   Encounter Diagnosis     ICD-10-CM    1. Lumbar spondylosis - Bilateral  M47.816       2. Lumbar radiculopathy  M54.16                      Subjective: Patient notes SPR is 2/10 this date. She feels that the gabapentin has been helpful with pain symptoms however right buttock pain does persist.       Objective: See treatment diary below      Assessment: Tolerated treatment well. Patient would benefit from continued PT. Patient is able to complete increased lumbar stabilization exercise with cues for proper completion of exercise. In standing cue to soften her knees and find a pelvic neutral position are helpful for improved ability to complete exercise. Plan: Continue per plan of care.       Precautions: None     Daily Treatment Diary:      Initial Evaluation Date: 08/15/23  Compliance 8/15  8/17  8/22  8/24  8/29  8/31 9/5  9/26  9/28     Visit Number 1 2  3  4  5  6  7  8  9     Re-Eval  IE                 [facility] Captured Y                           8/15  8/17  8/22  8/24  8/29  8/31  9     Manual                      L/S PA glides 5 min  5min  5 min  5 min  5m CD  8 min  5 m CD  5 min  10 min     L/S STM/ DTM 5 min 10 min  10 min  10 min  and L piri release 12m  7 min  10min  10 min  5 min                           Ther-Ex                      LTR 10" x3  5"x10  5" x10  5" x10  5"x10  5" x10  5"x10  5"x10  5" x10     SKTC stretch 10" x3  5"x5  10" x5  held  5"x5  5" x5  5"x5  5"x5  5" x5     Piriformis stretch 10" x3  15"x3  10"  held 10"x3  20" x3  20"x3  20"x3  20" x3     Open book stretch ---> 5"x10 ea 5" x10 5" x10 5" x10 5" x10 5"x10 5"x10 5" x10    TA set x10    -  x10    5" x10  5"x15  5"x15  HEP     TA with PPT x10  5"x15  -  -  5"x10  5" x10  5"x15  5"x15  5" x15     TA with march --->  x15  -  -  x15  x15  x15 NP  -     TA with SLR --->  2x10  -  1x10  10x ea  10x ea  2x10 ea  2x10 ea  2x10     TA bridge --->  15x  -  -       2x10  2x10     SL clamshells  15x ea - 15x ea 2x10 ea 2x10 ea 2x10 ea 2x10 ea 2x10    Reverse Clamshells        Eddie 2x10 2x10    S/L Hip Abd        Eddie 2x10 2x10    Dead Bug        10x  UE & LE 10x    Palloff Press         GTB 5"x10BIl GTB 5" x10    Seated p-ball roll outs --->    -  -               Nu- step --->  L3 7 min  L3 7 min (left knee pain)  -               Neuro Re-Ed                      Prone hip ext -pillow under abdomen --->                                                                         Ther-Act              STS                      Step ups             Resisted Walking at [employer]              Modalities                      HP/ CP PRN  10' CP 10'  And TENS 10' post Declined   HP x10 min

## 2023-10-03 ENCOUNTER — EVALUATION (OUTPATIENT)
Dept: PHYSICAL THERAPY | Facility: CLINIC | Age: 68
End: 2023-10-03
Payer: MEDICARE

## 2023-10-03 DIAGNOSIS — M54.16 LUMBAR RADICULOPATHY: ICD-10-CM

## 2023-10-03 DIAGNOSIS — M47.816 LUMBAR SPONDYLOSIS: Primary | ICD-10-CM

## 2023-10-03 PROCEDURE — 97140 MANUAL THERAPY 1/> REGIONS: CPT | Performed by: PHYSICAL THERAPIST

## 2023-10-03 PROCEDURE — 97112 NEUROMUSCULAR REEDUCATION: CPT | Performed by: PHYSICAL THERAPIST

## 2023-10-03 PROCEDURE — 97110 THERAPEUTIC EXERCISES: CPT | Performed by: PHYSICAL THERAPIST

## 2023-10-03 NOTE — PROGRESS NOTES
PT Evaluation     Today's date: 10/3/2023  Patient name: Kitty Prater  : 1955  MRN: [id number]  Referring provider: Catherine Cruz MD  Dx:   Encounter Diagnosis     ICD-10-CM    1. Lumbar spondylosis - Bilateral  M47.816       2. Lumbar radiculopathy  M54.16           Start Time: 1530  Stop Time: 1628  Total time in clinic (min): 58 minutes    Assessment  Assessment details: Patient is a 79 y.o. female who is responding well to pain management intervention and is now presenting to PT to improve strength and flexibility. Patient does demonstrate limited and painful ROM of the L/S into lateral flexion and extension. Patient demonstrate strength deficits of the core at 3+/5 and erector spinae at 4/5. She demonstrates mild weakness through her B hips. She has decreased segmental mobility of the L/S from L2-5. Patient is expected to respond well to PT intervention to increase AROM and improve strength while working to decrease remaining pain symptoms. UPDATE 10/3/23  Patient has made steady progress with improved flexibility and tolerance to core stabilization progression. She has also responded well to use of Gabapentin. Patient is able to demonstrate strength at 4-4+/5 at this time. She has recently progressed into higher level stability exercise with cues for proper completion. Will continue PT to achieve goals and progress HEP prior to d/c likely by the end of October. Impairments: activity intolerance, lacks appropriate home exercise program and pain with function    Symptom irritability: moderateUnderstanding of Dx/Px/POC: excellent  Goals  STG - 4 weeks  Patient able to demonstrate increased L/S AROM by 25% into extension. Patient to report pain at worst 4/10 with daily activity. Patient to have increased strength of the B hips to 4+/5. Patient to be independent with HEP. LTG - 8 weeks  Patient able to demonstrate painfree AROM fo the L/S.   Patient able to demonstrate good posture in sitting and standing. Patient able to demonstrate proper body mechanics for lifting and carrying items. Patient able to ambulate with normalized gait pattern. Patient to demonstrate core strength at  4/5 . Patient to be independent with final HEP. Plan  Plan details: Patient's evaluation is completed. Patient was instructed with a HEP this date. Patient has scheduled further PT sessions for treatment. Patient would benefit from: skilled physical therapy  Planned modality interventions: TENS, thermotherapy: hydrocollator packs and cryotherapy  Planned therapy interventions: manual therapy, neuromuscular re-education, therapeutic exercise, therapeutic training and home exercise program  Frequency: 2x week  Duration in weeks: 4  Plan of Care beginning date: 10/3/2023  Plan of Care expiration date: 10/31/2023        Subjective Evaluation    History of Present Illness  Mechanism of injury: Patient has had injections in the L/S that have improved her symptoms 75-90%. She is hoping PT will help increase strength. She notes that standing typically increases her pain rapidly. She notes vacuuming/ making beds increases pain. She notes that any lifting will provoke symptoms. She notes that pain is always present. Symptoms are centrally located in the low back. Symptoms have been present since 2022. Patient works part-time at [employer]. UPDATE 10/3/23  Patient notes very minimal pain symptoms over the past week. She notes that she is compliant with her HEP and finds that it helps with her stiffness. Patient is pleased with her progress. Feels she will benefit from progression of exercise prior to d/c to HEP.    Patient Goals  Patient goals for therapy: decreased pain, increased motion and increased strength    Pain  Current pain ratin  At best pain ratin  At worst pain ratin  Quality: sharp and dull ache  Relieving factors: heat, ice and medications    Social Support  Steps to enter house: yes  4  Stairs in house: yes   13  Lives in: multiple-level home    Employment status: working  Treatments  Previous treatment: injection treatment        Objective     Postural Observations  Seated posture: fair  Standing posture: fair  Correction of posture: has no consistent effect        Palpation   Left   Tenderness of the erector spinae, lumbar paraspinals and quadratus lumborum. Right   Tenderness of the erector spinae, lumbar paraspinals and quadratus lumborum.      Active Range of Motion     Lumbar   Flexion:  WFL  Extension:  Restriction level: moderate  Left lateral flexion:  Restriction level: minimal  Right lateral flexion:  Restriction level: minimal  Left rotation:  WFL  Right rotation:  [facility]    Strength/Myotome Testing     Left Hip   Planes of Motion   Flexion: 4+  Extension: 4  Abduction: 4  Adduction: 4    Right Hip   Planes of Motion   Flexion: 4+  Extension: 4  Abduction: 4  Adduction: 4    Left Knee   Flexion: 4+  Extension: 4+    Right Knee   Flexion: 4+  Extension: 4+    Left Ankle/Foot   Dorsiflexion: 4+  Plantar flexion: 4+    Right Ankle/Foot   Dorsiflexion: 4+  Plantar flexion: 4+    Tests     Lumbar     Left   Negative passive SLR and slump test.     Right   Negative passive SLR and slump test.              Precautions: None     Daily Treatment Diary:      Initial Evaluation Date: 08/15/23  Compliance 8/15  8/17  8/22  8/24  8/29  8/31 9/5  9/26  9/28  10/3   Visit Number 1 2  3  4  5  6  7  8  9  10   Re-Eval  IE                 Y   Foto Captured Mora Ganser          8/15  8/17  8/22  8/24  8/29  8/31  9/5  9/26  9/28  10/3   Manual                      L/S PA glides 5 min  5min  5 min  5 min  5m CD  8 min  5 m CD  5 min  10 min  10 min   L/S STM/ DTM 5 min 10 min  10 min  10 min  and L piri release 12m  7 min  10min  10 min  5 min  5 min                         Ther-Ex                      LTR 10" x3  5"x10  5" x10  5" x10  5"x10  5" x10  5"x10  5"x10  5" x10  5" x10   SKTC stretch 10" x3  5"x5  10" x5  held  5"x5  5" x5  5"x5  5"x5  5" x5     Piriformis stretch 10" x3  15"x3  10"  held 10"x3  20" x3  20"x3  20"x3  20" x3     Open book stretch ---> 5"x10 ea 5" x10 5" x10 5" x10 5" x10 5"x10 5"x10 5" x10 5" x10   TA set x10    -  x10    5" x10  5"x15  5"x15  HEP  prone on elbows 10" x5   TA with PPT x10  5"x15  -  -  5"x10  5" x10  5"x15  5"x15  5" x15  5" x15   TA with march --->  x15  -  -  x15  x15  x15 NP  -  -   TA with SLR --->  2x10  -  1x10  10x ea  10x ea  2x10 ea  2x10 ea  2x10  2x10   TA bridge --->  15x  -  -       2x10  2x10  2x10   SL clamshells  15x ea - 15x ea 2x10 ea 2x10 ea 2x10 ea 2x10 ea 2x10 2x10   Reverse Clamshells        Eddie 2x10 2x10 2x10   S/L Hip Abd        Eddie 2x10 2x10 2x10   Dead Bug        10x  UE & LE 10x 10x   Palloff Press         GTB 5"x10BIl GTB 5" x10 GTB 5" x10   Seated p-ball roll outs --->    -  -               Nu- step --->  L3 7 min  L3 7 min (left knee pain)  -               Neuro Re-Ed                      Prone hip ext -pillow under abdomen --->                                                                         Ther-Act              STS                      Step ups             Resisted Walking at [employer]              Modalities                      HP/ CP PRN  10' CP 10'  And TENS 10' post Declined   HP x10 min

## 2023-10-04 ENCOUNTER — OFFICE VISIT (OUTPATIENT)
Dept: PHYSICAL THERAPY | Facility: CLINIC | Age: 68
End: 2023-10-04
Payer: MEDICARE

## 2023-10-04 DIAGNOSIS — M54.16 LUMBAR RADICULOPATHY: ICD-10-CM

## 2023-10-04 DIAGNOSIS — M47.816 LUMBAR SPONDYLOSIS: Primary | ICD-10-CM

## 2023-10-04 PROCEDURE — 97112 NEUROMUSCULAR REEDUCATION: CPT

## 2023-10-04 PROCEDURE — 97140 MANUAL THERAPY 1/> REGIONS: CPT

## 2023-10-04 PROCEDURE — 97110 THERAPEUTIC EXERCISES: CPT

## 2023-10-04 NOTE — PROGRESS NOTES
Daily Note     Today's date: 10/4/2023  Patient name: David De La Torre  : 1955  MRN: [id number]  Referring provider: Keshia Mccormick MD  Dx:   Encounter Diagnosis     ICD-10-CM    1. Lumbar spondylosis - Bilateral  M47.816       2. Lumbar radiculopathy  M54.16                      Subjective: Patient reports she is a little sore this morning, but no significant pain. Objective: See treatment diary below      Assessment: Tolerated treatment well without complaint. Moderate relief with STM today. Some verbal cues required for proper core engagement with pball additions. Patient would benefit from continued PT      Plan: Continue per plan of care.       Precautions: None     Daily Treatment Diary:      Initial Evaluation Date: 08/15/23  Compliance 10/4    8/24  8/29  8/31 9/5  9/26  9/28  10/3   Visit Number 11    4  5  6  7  8  9  10   Re-Eval                  Y   Foto Captured                 Y          10/4    8/24  8/29  8/31  9/5  9/26  9/28  10/3   Manual                    L/S PA glides     5 min  5m CD  8 min  5 m CD  5 min  10 min  10 min   L/S STM/ DTM 10    10 min  and L piri release 12m  7 min  10min  10 min  5 min  5 min                       Ther-Ex                    LTR 5" 10x    5" x10  5"x10  5" x10  5"x10  5"x10  5" x10  5" x10   SKTC stretch 5"x5    held  5"x5  5" x5  5"x5  5"x5  5" x5     Piriformis stretch 20"x3    held 10"x3  20" x3  20"x3  20"x3  20" x3     Open book stretch 5"x10   5" x10 5" x10 5" x10 5"x10 5"x10 5" x10 5" x10   TA set prone on elbows 10" x5    x10    5" x10  5"x15  5"x15  HEP  prone on elbows 10" x5   TA with PPT 5"x15    -  5"x10  5" x10  5"x15  5"x15  5" x15  5" x15   TA with march -    -  x15  x15  x15 NP  -  -   TA with SLR 2x10    1x10  10x ea  10x ea  2x10 ea  2x10 ea  2x10  2x10   TA bridge 2x10 on pball    -       2x10  2x10  2x10   SL clamshells 2x10   15x ea 2x10 ea 2x10 ea 2x10 ea 2x10 ea 2x10 2x10   Reverse Clamshells 2x10       Eddie 2x10 2x10 2x10 S/L Hip Abd 2x10       Eddie 2x10 2x10 2x10   Dead Bug 10x       10x  UE & LE 10x 10x   Palloff Press         GTB 5"x10BIl GTB 5" x10 GTB 5" x10   Seated p-ball TA c LAQ, march 10x ea    -               Nu- step     -               Neuro Re-Ed                    Prone hip ext -pillow under abdomen                                                                        Ther-Act              STS                    Step ups             Resisted Walking at [employer]              Modalities                    HP/ CP PRN     And TENS 10' post Declined   HP x10 min

## 2023-10-10 ENCOUNTER — APPOINTMENT (OUTPATIENT)
Dept: PHYSICAL THERAPY | Facility: CLINIC | Age: 68
End: 2023-10-10
Payer: MEDICARE

## 2023-10-12 ENCOUNTER — APPOINTMENT (OUTPATIENT)
Dept: PHYSICAL THERAPY | Facility: CLINIC | Age: 68
End: 2023-10-12
Payer: MEDICARE

## 2023-11-19 DIAGNOSIS — M48.062 SPINAL STENOSIS OF LUMBAR REGION WITH NEUROGENIC CLAUDICATION: ICD-10-CM

## 2023-11-19 DIAGNOSIS — M54.16 LUMBAR RADICULOPATHY: ICD-10-CM

## 2023-11-20 RX ORDER — GABAPENTIN 300 MG/1
300 CAPSULE ORAL 3 TIMES DAILY
Qty: 90 CAPSULE | Refills: 2 | Status: SHIPPED | OUTPATIENT
Start: 2023-11-20

## 2023-12-30 ENCOUNTER — APPOINTMENT (EMERGENCY)
Dept: CT IMAGING | Facility: HOSPITAL | Age: 68
End: 2023-12-30
Payer: MEDICARE

## 2023-12-30 ENCOUNTER — APPOINTMENT (OUTPATIENT)
Dept: RADIOLOGY | Facility: HOSPITAL | Age: 68
End: 2023-12-30
Payer: MEDICARE

## 2023-12-30 ENCOUNTER — HOSPITAL ENCOUNTER (EMERGENCY)
Facility: HOSPITAL | Age: 68
Discharge: HOME | End: 2023-12-30
Attending: EMERGENCY MEDICINE
Payer: MEDICARE

## 2023-12-30 VITALS
TEMPERATURE: 98 F | OXYGEN SATURATION: 98 % | BODY MASS INDEX: 32.96 KG/M2 | RESPIRATION RATE: 18 BRPM | HEART RATE: 79 BPM | SYSTOLIC BLOOD PRESSURE: 198 MMHG | HEIGHT: 67 IN | WEIGHT: 210 LBS | DIASTOLIC BLOOD PRESSURE: 83 MMHG

## 2023-12-30 DIAGNOSIS — R07.89 ATYPICAL CHEST PAIN: ICD-10-CM

## 2023-12-30 DIAGNOSIS — I16.0 HYPERTENSIVE URGENCY: Primary | ICD-10-CM

## 2023-12-30 DIAGNOSIS — E04.1 THYROID NODULE: ICD-10-CM

## 2023-12-30 LAB
2HR DELTA HS TROPONIN: <-1 NG/L
ALBUMIN SERPL BCP-MCNC: 4.4 G/DL (ref 3.5–5)
ALP SERPL-CCNC: 69 U/L (ref 34–104)
ALT SERPL W P-5'-P-CCNC: 19 U/L (ref 7–52)
ANION GAP SERPL CALCULATED.3IONS-SCNC: 7 MMOL/L
AST SERPL W P-5'-P-CCNC: 19 U/L (ref 13–39)
BASOPHILS # BLD AUTO: 0.05 THOUSANDS/ÂΜL (ref 0–0.1)
BASOPHILS NFR BLD AUTO: 1 % (ref 0–1)
BILIRUB SERPL-MCNC: 0.4 MG/DL (ref 0.2–1)
BUN SERPL-MCNC: 15 MG/DL (ref 5–25)
CALCIUM SERPL-MCNC: 9.7 MG/DL (ref 8.4–10.2)
CARDIAC TROPONIN I PNL SERPL HS: 3 NG/L
CARDIAC TROPONIN I PNL SERPL HS: <2 NG/L
CHLORIDE SERPL-SCNC: 105 MMOL/L (ref 96–108)
CO2 SERPL-SCNC: 24 MMOL/L (ref 21–32)
CREAT SERPL-MCNC: 0.66 MG/DL (ref 0.6–1.3)
D DIMER PPP FEU-MCNC: 0.91 UG/ML FEU
EOSINOPHIL # BLD AUTO: 0.09 THOUSAND/ÂΜL (ref 0–0.61)
EOSINOPHIL NFR BLD AUTO: 1 % (ref 0–6)
ERYTHROCYTE [DISTWIDTH] IN BLOOD BY AUTOMATED COUNT: 12.8 % (ref 11.6–15.1)
FLUAV RNA RESP QL NAA+PROBE: NEGATIVE
FLUBV RNA RESP QL NAA+PROBE: NEGATIVE
GFR SERPL CREATININE-BSD FRML MDRD: 92 ML/MIN/1.73SQ M
GLUCOSE SERPL-MCNC: 95 MG/DL (ref 65–140)
HCT VFR BLD AUTO: 45.2 % (ref 34.8–46.1)
HGB BLD-MCNC: 14.8 G/DL (ref 11.5–15.4)
IMM GRANULOCYTES # BLD AUTO: 0.08 THOUSAND/UL (ref 0–0.2)
IMM GRANULOCYTES NFR BLD AUTO: 1 % (ref 0–2)
LYMPHOCYTES # BLD AUTO: 2.97 THOUSANDS/ÂΜL (ref 0.6–4.47)
LYMPHOCYTES NFR BLD AUTO: 34 % (ref 14–44)
MCH RBC QN AUTO: 30.6 PG (ref 26.8–34.3)
MCHC RBC AUTO-ENTMCNC: 32.7 G/DL (ref 31.4–37.4)
MCV RBC AUTO: 93 FL (ref 82–98)
MONOCYTES # BLD AUTO: 0.75 THOUSAND/ÂΜL (ref 0.17–1.22)
MONOCYTES NFR BLD AUTO: 9 % (ref 4–12)
NEUTROPHILS # BLD AUTO: 4.76 THOUSANDS/ÂΜL (ref 1.85–7.62)
NEUTS SEG NFR BLD AUTO: 54 % (ref 43–75)
NRBC BLD AUTO-RTO: 0 /100 WBCS
PLATELET # BLD AUTO: 328 THOUSANDS/UL (ref 149–390)
PMV BLD AUTO: 9 FL (ref 8.9–12.7)
POTASSIUM SERPL-SCNC: 4.6 MMOL/L (ref 3.5–5.3)
PROT SERPL-MCNC: 6.9 G/DL (ref 6.4–8.4)
RBC # BLD AUTO: 4.84 MILLION/UL (ref 3.81–5.12)
RSV RNA RESP QL NAA+PROBE: NEGATIVE
SARS-COV-2 RNA RESP QL NAA+PROBE: NEGATIVE
SODIUM SERPL-SCNC: 136 MMOL/L (ref 135–147)
WBC # BLD AUTO: 8.7 THOUSAND/UL (ref 4.31–10.16)

## 2023-12-30 PROCEDURE — 96374 THER/PROPH/DIAG INJ IV PUSH: CPT

## 2023-12-30 PROCEDURE — 85025 COMPLETE CBC W/AUTO DIFF WBC: CPT | Performed by: EMERGENCY MEDICINE

## 2023-12-30 PROCEDURE — 85379 FIBRIN DEGRADATION QUANT: CPT | Performed by: EMERGENCY MEDICINE

## 2023-12-30 PROCEDURE — G1004 CDSM NDSC: HCPCS

## 2023-12-30 PROCEDURE — 36415 COLL VENOUS BLD VENIPUNCTURE: CPT

## 2023-12-30 PROCEDURE — 71046 X-RAY EXAM CHEST 2 VIEWS: CPT

## 2023-12-30 PROCEDURE — 84484 ASSAY OF TROPONIN QUANT: CPT | Performed by: EMERGENCY MEDICINE

## 2023-12-30 PROCEDURE — 99285 EMERGENCY DEPT VISIT HI MDM: CPT | Performed by: EMERGENCY MEDICINE

## 2023-12-30 PROCEDURE — 80053 COMPREHEN METABOLIC PANEL: CPT | Performed by: EMERGENCY MEDICINE

## 2023-12-30 PROCEDURE — 71275 CT ANGIOGRAPHY CHEST: CPT

## 2023-12-30 PROCEDURE — 99285 EMERGENCY DEPT VISIT HI MDM: CPT

## 2023-12-30 PROCEDURE — 0241U HB NFCT DS VIR RESP RNA 4 TRGT: CPT | Performed by: EMERGENCY MEDICINE

## 2023-12-30 PROCEDURE — 93005 ELECTROCARDIOGRAM TRACING: CPT

## 2023-12-30 RX ORDER — HYDRALAZINE HYDROCHLORIDE 20 MG/ML
5 INJECTION INTRAMUSCULAR; INTRAVENOUS ONCE
Status: COMPLETED | OUTPATIENT
Start: 2023-12-30 | End: 2023-12-30

## 2023-12-30 RX ORDER — CLONIDINE HYDROCHLORIDE 0.1 MG/1
0.2 TABLET ORAL ONCE
Status: COMPLETED | OUTPATIENT
Start: 2023-12-30 | End: 2023-12-30

## 2023-12-30 RX ORDER — HYDRALAZINE HYDROCHLORIDE 20 MG/ML
10 INJECTION INTRAMUSCULAR; INTRAVENOUS ONCE
Status: DISCONTINUED | OUTPATIENT
Start: 2023-12-30 | End: 2023-12-30

## 2023-12-30 RX ADMIN — HYDRALAZINE HYDROCHLORIDE 5 MG: 20 INJECTION INTRAMUSCULAR; INTRAVENOUS at 15:34

## 2023-12-30 RX ADMIN — HYDRALAZINE HYDROCHLORIDE 5 MG: 20 INJECTION INTRAMUSCULAR; INTRAVENOUS at 15:52

## 2023-12-30 RX ADMIN — CLONIDINE HYDROCHLORIDE 0.2 MG: 0.1 TABLET ORAL at 15:51

## 2023-12-30 RX ADMIN — IOHEXOL 62 ML: 350 INJECTION, SOLUTION INTRAVENOUS at 16:09

## 2023-12-30 NOTE — ED PROVIDER NOTES
History  Chief Complaint   Patient presents with    Chest Pain     Pt endorses chest pain every morning x 1-2 weeks    High Blood Pressure     Patient complains of pain under her left breast every morning for the past 1 to 2 weeks.  However, last night she was awakened with left upper chest pain that feels like she cannot take a deep breath since 1 AM today.  Had a recent history of right ankle surgery 2 and half weeks ago.  No history of PE or DVT.  Does not take an aspirin daily.  No blood thinners.      History provided by:  Patient   used: No    Chest Pain  Pain location:  L chest  Pain quality: aching and pressure    Pain radiates to:  Does not radiate  Pain radiates to the back: no    Pain severity:  Mild  Onset quality:  Gradual  Duration:  14 hours  Timing:  Constant  Progression:  Unchanged  Chronicity:  New  Context: at rest    Context: no drug use and no intercourse    Relieved by:  Nothing  Worsened by:  Nothing tried  Ineffective treatments:  None tried  Associated symptoms: shortness of breath    Associated symptoms: no abdominal pain, no altered mental status, no anorexia, no back pain, no cough, no dizziness, no dysphagia, no fever, no headache, no nausea, no orthopnea, no palpitations and not vomiting        Prior to Admission Medications   Prescriptions Last Dose Informant Patient Reported? Taking?   ALPRAZolam (XANAX) 1 mg tablet   Yes No   Sig: Pt states, takes for anxiety prn   B Complex Vitamins (VITAMIN B COMPLEX) TABS  Self Yes No   Sig: Take 2 tablets by mouth daily    Cholecalciferol 25 MCG (1000 UT) capsule  Self Yes No   Sig: Take 1,000 Units by mouth daily    Coenzyme Q10 (CO Q 10 PO)   Yes No   Sig: Take by mouth   FLUoxetine (PROzac) 40 MG capsule  Self Yes No   Sig: Take by mouth daily    Multiple Vitamins-Minerals (CENTRUM SILVER ULTRA WOMENS) TABS  Self Yes No   Sig: Take 1 tablet by mouth daily    Omega-3 Fatty Acids (OMEGA-3 FISH OIL) 1000 MG CAPS  Self Yes No    Sig: Take 1 capsule by mouth daily   Progesterone 200 MG CAPS   Yes No   Sig: Take 1 capsule by mouth every evening   celecoxib (CeleBREX) 200 mg capsule   Yes No   gabapentin (NEURONTIN) 300 mg capsule   No No   Sig: TAKE 1 CAPSULE BY MOUTH THREE TIMES A DAY   lansoprazole (PREVACID) 30 mg capsule  Self Yes No   Sig: Take by mouth daily    ramelteon (ROZEREM) 8 mg tablet  Self Yes No   Si mg daily at bedtime    simvastatin (ZOCOR) 40 mg tablet  Self Yes No   Sig: Take 40 mg by mouth daily at bedtime    tiZANidine (ZANAFLEX) 4 mg tablet   Yes No   Sig: Take 1 tablet by mouth every 6 (six) hours as needed   traZODone (DESYREL) 50 mg tablet  Self Yes No   Sig: Take 50 mg by mouth daily at bedtime    valACYclovir (VALTREX) 1,000 mg tablet   Yes No   Sig: TAKE 2 TABLETS BY MOUTH EVERY 12 HOURS AS DIRECTED   vitamin A 10,000 units capsule  Self Yes No   Sig: Take 10,000 Units by mouth daily    vitamin E, tocopherol, 400 units capsule  Self Yes No   Sig: Take 400 Units by mouth daily       Facility-Administered Medications: None       Past Medical History:   Diagnosis Date    Anxiety     Cerebral aneurysm     Hyperlipidemia        Past Surgical History:   Procedure Laterality Date    BREAST CYST ASPIRATION Right 2022    cyst that abscessed    CARDIAC CATHETERIZATION          CAUDAL BLOCK N/A 2023    Procedure: BLOCK / INJECTION CAUDAL;  Surgeon: Hollis Avila MD;  Location: [facility];  Service: Pain Management     CEREBRAL ANEURYSM REPAIR  1997    at [facility]      EPIDURAL BLOCK INJECTION N/A 8/3/2023    Procedure: L5-S1 ILESI;  Surgeon: Hollis Avila MD;  Location: [facility];  Service: Pain Management     NERVE BLOCK Bilateral 2023    Procedure: BLOCK MEDIAL BRANCH L3, L4, L5 #1;  Surgeon: Hollis Avila MD;  Location: [facility];  Service: Pain Management     NERVE BLOCK Bilateral 2023    Procedure: BLOCK MEDIAL BRANCH L3, L4, L5 #2;  Surgeon: Hollis Avila MD;   Location: [facility];  Service: Pain Management     RHIZOTOMY Bilateral 6/15/2023    Procedure: RHIZOTOMY LUMBAR Medial branch nerves L3, L4, L5;  Surgeon: Hollis Avila MD;  Location: [facility];  Service: Pain Management        Family History   Problem Relation Age of Onset    No Known Problems Mother     Prostate cancer Father     No Known Problems Daughter     No Known Problems Daughter     No Known Problems Maternal Grandmother     No Known Problems Maternal Grandfather     No Known Problems Paternal Grandmother     No Known Problems Paternal Grandfather     No Known Problems Paternal Aunt      I have reviewed and agree with the history as documented.    E-Cigarette/Vaping     E-Cigarette/Vaping Substances     Social History     Tobacco Use    Smoking status: Every Day     Current packs/day: 0.25     Average packs/day: 0.3 packs/day for 25.0 years (6.3 ttl pk-yrs)     Types: Cigarettes    Smokeless tobacco: Never   Substance Use Topics    Alcohol use: Never    Drug use: Never       Review of Systems   Constitutional:  Negative for chills and fever.   HENT:  Negative for ear pain, hearing loss, sore throat, trouble swallowing and voice change.    Eyes:  Negative for pain and discharge.   Respiratory:  Positive for shortness of breath. Negative for cough and wheezing.    Cardiovascular:  Positive for chest pain. Negative for palpitations and orthopnea.   Gastrointestinal:  Negative for abdominal pain, anorexia, blood in stool, constipation, diarrhea, nausea and vomiting.   Genitourinary:  Negative for dysuria, flank pain, frequency and hematuria.   Musculoskeletal:  Negative for back pain, joint swelling, neck pain and neck stiffness.   Skin:  Negative for rash and wound.   Neurological:  Negative for dizziness, seizures, syncope, facial asymmetry and headaches.   Psychiatric/Behavioral:  Negative for hallucinations, self-injury and suicidal ideas.    All other systems reviewed and are negative.      Physical  Exam  Physical Exam  Vitals and nursing note reviewed.   Constitutional:       General: She is not in acute distress.     Appearance: She is well-developed.   HENT:      Head: Normocephalic and atraumatic.      Right Ear: External ear normal.      Left Ear: External ear normal.   Eyes:      General: No scleral icterus.        Right eye: No discharge.         Left eye: No discharge.      Extraocular Movements: Extraocular movements intact.      Conjunctiva/sclera: Conjunctivae normal.   Cardiovascular:      Rate and Rhythm: Normal rate and regular rhythm.      Heart sounds: Normal heart sounds. No murmur heard.  Pulmonary:      Effort: Pulmonary effort is normal.      Breath sounds: Normal breath sounds. No wheezing or rales.   Abdominal:      General: Bowel sounds are normal. There is no distension.      Palpations: Abdomen is soft.      Tenderness: There is no abdominal tenderness. There is no guarding or rebound.   Musculoskeletal:         General: No deformity. Normal range of motion.      Cervical back: Normal range of motion and neck supple.   Skin:     General: Skin is warm and dry.      Findings: No rash.   Neurological:      General: No focal deficit present.      Mental Status: She is alert and oriented to person, place, and time.      Cranial Nerves: No cranial nerve deficit.   Psychiatric:         Mood and Affect: Mood normal.         Behavior: Behavior normal.         Thought Content: Thought content normal.         Judgment: Judgment normal.         Vital Signs  ED Triage Vitals   Temperature Pulse Respirations Blood Pressure SpO2   12/30/23 1158 12/30/23 1153 12/30/23 1153 12/30/23 1153 12/30/23 1153   98 °F (36.7 °C) 81 18 (!) 171/80 98 %      Temp src Heart Rate Source Patient Position - Orthostatic VS BP Location FiO2 (%)   -- 12/30/23 1153 12/30/23 1153 12/30/23 1153 --    Monitor Lying Right arm       Pain Score       12/30/23 1153       6           Vitals:    12/30/23 1153 12/30/23 1501 12/30/23  1509 12/30/23 1534   BP: (!) 171/80 (!) 185/88 (!) 173/85 (!) 198/83   Pulse: 81 79 79    Patient Position - Orthostatic VS: Lying Lying Lying          Visual Acuity      ED Medications  Medications   hydrALAZINE (APRESOLINE) injection 5 mg (5 mg Intravenous Given 12/30/23 1534)   hydrALAZINE (APRESOLINE) injection 5 mg (5 mg Intravenous Given 12/30/23 1552)   cloNIDine (CATAPRES) tablet 0.2 mg (0.2 mg Oral Given 12/30/23 1551)   iohexol (OMNIPAQUE) 350 MG/ML injection (MULTI-DOSE) 62 mL (62 mL Intravenous Given 12/30/23 1609)       Diagnostic Studies  Results Reviewed       Procedure Component Value Units Date/Time    HS Troponin I 2hr [[id number]]  (Normal) Collected: 12/30/23 1501    Lab Status: Final result Specimen: Blood from Arm, Left Updated: 12/30/23 1531     hs TnI 2hr <2 ng/L      Delta 2hr hsTnI <-1 ng/L     D-dimer, quantitative [[id number]]  (Abnormal) Collected: 12/30/23 1516    Lab Status: Final result Specimen: Blood Updated: 12/30/23 1531     D-Dimer, Quant 0.91 ug/ml FEU     Narrative:      In the evaluation for possible pulmonary embolism, in the appropriate (Well's Score of 4 or less) patient, the age adjusted d-dimer cutoff for this patient can be calculated as:    Age x 0.01 (in ug/mL) for Age-adjusted D-dimer exclusion threshold for a patient over 50 years.    FLU/RSV/COVID - if FLU/RSV clinically relevant [[id number]]  (Normal) Collected: 12/30/23 1158    Lab Status: Final result Specimen: Nares from Nose Updated: 12/30/23 1244     SARS-CoV-2 Negative     INFLUENZA A PCR Negative     INFLUENZA B PCR Negative     RSV PCR Negative    Narrative:      FOR PEDIATRIC PATIENTS - copy/paste COVID Guidelines URL to browser: [email or web address]    SARS-CoV-2 assay is a Nucleic Acid Amplification assay intended for the  qualitative detection of nucleic acid from SARS-CoV-2 in nasopharyngeal  swabs. Results are for the presumptive identification of  SARS-CoV-2 RNA.    Positive results are indicative of infection with SARS-CoV-2, the virus  causing COVID-19, but do not rule out bacterial infection or co-infection  with other viruses. Laboratories within the [place] and its  territories are required to report all positive results to the appropriate  public health authorities. Negative results do not preclude SARS-CoV-2  infection and should not be used as the sole basis for treatment or other  patient management decisions. Negative results must be combined with  clinical observations, patient history, and epidemiological information.  This test has not been FDA cleared or approved.    This test has been authorized by FDA under an Emergency Use Authorization  (EUA). This test is only authorized for the duration of time the  declaration that circumstances exist justifying the authorization of the  emergency use of an in vitro diagnostic tests for detection of SARS-CoV-2  virus and/or diagnosis of COVID-19 infection under section 564(b)(1) of  the Act, 21 U.S.C. 360bbb-3(b)(1), unless the authorization is terminated  or revoked sooner. The test has been validated but independent review by FDA  and CLIA is pending.    Test performed using Cepheid GeneXpert: This RT-PCR assay targets N2,  a region unique to SARS-CoV-2. A conserved region in the E-gene was chosen  for pan-Sarbecovirus detection which includes SARS-CoV-2.    According to CMS-2020-01-R, this platform meets the definition of high-throughput technology.    HS Troponin 0hr (reflex protocol) [[id number]]  (Normal) Collected: 12/30/23 1158    Lab Status: Final result Specimen: Blood from Arm, Left Updated: 12/30/23 1237     hs TnI 0hr 3 ng/L     Comprehensive metabolic panel [[id number]] Collected: 12/30/23 1158    Lab Status: Final result Specimen: Blood from Arm, Left Updated: 12/30/23 1231     Sodium 136 mmol/L      Potassium 4.6 mmol/L      Chloride 105 mmol/L      CO2 24 mmol/L      ANION GAP 7  mmol/L      BUN 15 mg/dL      Creatinine 0.66 mg/dL      Glucose 95 mg/dL      Calcium 9.7 mg/dL      AST 19 U/L      ALT 19 U/L      Alkaline Phosphatase 69 U/L      Total Protein 6.9 g/dL      Albumin 4.4 g/dL      Total Bilirubin 0.40 mg/dL      eGFR 92 ml/min/1.73sq m     Narrative:      National Kidney Disease Foundation guidelines for Chronic Kidney Disease (CKD):     Stage 1 with normal or high GFR (GFR > 90 mL/min/1.73 square meters)    Stage 2 Mild CKD (GFR = 60-89 mL/min/1.73 square meters)    Stage 3A Moderate CKD (GFR = 45-59 mL/min/1.73 square meters)    Stage 3B Moderate CKD (GFR = 30-44 mL/min/1.73 square meters)    Stage 4 Severe CKD (GFR = 15-29 mL/min/1.73 square meters)    Stage 5 End Stage CKD (GFR <15 mL/min/1.73 square meters)  Note: GFR calculation is accurate only with a steady state creatinine    CBC and differential [[id number]] Collected: 12/30/23 1158    Lab Status: Final result Specimen: Blood from Arm, Left Updated: 12/30/23 1207     WBC 8.70 Thousand/uL      RBC 4.84 Million/uL      Hemoglobin 14.8 g/dL      Hematocrit 45.2 %      MCV 93 fL      MCH 30.6 pg      MCHC 32.7 g/dL      RDW 12.8 %      MPV 9.0 fL      Platelets 328 Thousands/uL      nRBC 0 /100 WBCs      Neutrophils Relative 54 %      Immat GRANS % 1 %      Lymphocytes Relative 34 %      Monocytes Relative 9 %      Eosinophils Relative 1 %      Basophils Relative 1 %      Neutrophils Absolute 4.76 Thousands/µL      Immature Grans Absolute 0.08 Thousand/uL      Lymphocytes Absolute 2.97 Thousands/µL      Monocytes Absolute 0.75 Thousand/µL      Eosinophils Absolute 0.09 Thousand/µL      Basophils Absolute 0.05 Thousands/µL                    CTA ED chest PE study   Final Result by Lora Soliz MD (12/30 1706)      No pulmonary embolus.      Trace effusions.      Enlarged multinodular thyroid gland. Recommend further evaluation with nonemergent thyroid ultrasound.      This study was marked in Epic for immediate  notification and follow-up.            Workstation performed: [id number]         XR chest pa & lateral   ED Interpretation by Navjot Craven MD (12/30 7289)   NAD                 Procedures  ECG 12 Lead Documentation Only    Date/Time: 12/30/2023 3:04 PM    Performed by: Navjot Craven MD  Authorized by: Navjot Craven MD    ECG reviewed by me, the ED Provider: yes    Patient location:  ED  Rate:     ECG rate:  80  Rhythm:     Rhythm: sinus rhythm    Ectopy:     Ectopy: none    QRS:     QRS axis:  Normal           ED Course                               SBIRT 22yo+      Flowsheet Row Most Recent Value   Initial Alcohol Screen: US AUDIT-C     1. How often do you have a drink containing alcohol? 0 Filed at: 12/30/2023 1153   2. How many drinks containing alcohol do you have on a typical day you are drinking?  0 Filed at: 12/30/2023 1153   3a. Male UNDER 65: How often do you have five or more drinks on one occasion? 0 Filed at: 12/30/2023 1153   3b. FEMALE Any Age, or MALE 65+: How often do you have 4 or more drinks on one occassion? 0 Filed at: 12/30/2023 1153   Audit-C Score 0 Filed at: 12/30/2023 1153   CLIFTON: How many times in the past year have you...    Used an illegal drug or used a prescription medication for non-medical reasons? Never Filed at: 12/30/2023 1153                      Medical Decision Making  Amount and/or Complexity of Data Reviewed  Labs: ordered. Decision-making details documented in ED Course.  Radiology: ordered and independent interpretation performed. Decision-making details documented in ED Course.  ECG/medicine tests: ordered and independent interpretation performed. Decision-making details documented in ED Course.  Discussion of management or test interpretation with external provider(s): Differential diagnosis includes but not limited to STEMI, NSTEMI, PE, pneumonia, pneumothorax, musculoskeletal chest pain, costochondritis, gastritis, cholelithiasis, contusion,  strain    Risk  Prescription drug management.             Disposition  Final diagnoses:   Hypertensive urgency   Atypical chest pain   Thyroid nodule     Time reflects when diagnosis was documented in both MDM as applicable and the Disposition within this note       Time User Action Codes Description Comment    12/30/2023  4:02 PM Navjot Craven [I16.0] Hypertensive urgency     12/30/2023  4:02 PM Navjot Craven Add [R07.89] Atypical chest pain     12/30/2023  5:22 PM Navjot Craven Add [E04.1] Thyroid nodule           ED Disposition       ED Disposition   Discharge    Condition   Stable    Date/Time   Sat Dec 30, 2023 1602    Comment   Cecilia Tamayo discharge to home/self care.                   Follow-up Information       Follow up With Specialties Details Why Contact Info    Mauricio Hawley MD Family Medicine Call in 3 days  [address] [facility] [address] [phone]              Patient's Medications   Discharge Prescriptions    No medications on file       No discharge procedures on file.    PDMP Review       None            ED Provider  Electronically Signed by             Navjot Craven MD  12/30/23 [id number]

## 2024-01-01 LAB
ATRIAL RATE: 78 BPM
P AXIS: 25 DEGREES
PR INTERVAL: 160 MS
QRS AXIS: 12 DEGREES
QRSD INTERVAL: 84 MS
QT INTERVAL: 360 MS
QTC INTERVAL: 410 MS
T WAVE AXIS: 30 DEGREES
VENTRICULAR RATE: 78 BPM

## 2024-01-10 ENCOUNTER — DOCTOR'S OFFICE (OUTPATIENT)
Dept: URBAN - NONMETROPOLITAN AREA CLINIC 1 | Facility: CLINIC | Age: 69
Setting detail: OPHTHALMOLOGY
End: 2024-01-10
Payer: COMMERCIAL

## 2024-01-10 ENCOUNTER — HOSPITAL ENCOUNTER (OUTPATIENT)
Dept: ULTRASOUND IMAGING | Facility: HOSPITAL | Age: 69
Discharge: HOME | End: 2024-01-10
Payer: MEDICARE

## 2024-01-10 DIAGNOSIS — H35.033: ICD-10-CM

## 2024-01-10 DIAGNOSIS — Z96.1: ICD-10-CM

## 2024-01-10 DIAGNOSIS — E04.1 NONTOXIC SINGLE THYROID NODULE: ICD-10-CM

## 2024-01-10 DIAGNOSIS — H40.033: ICD-10-CM

## 2024-01-10 DIAGNOSIS — H16.222: ICD-10-CM

## 2024-01-10 DIAGNOSIS — H40.013: ICD-10-CM

## 2024-01-10 DIAGNOSIS — H16.221: ICD-10-CM

## 2024-01-10 DIAGNOSIS — H52.201: ICD-10-CM

## 2024-01-10 PROCEDURE — 76536 US EXAM OF HEAD AND NECK: CPT

## 2024-01-10 PROCEDURE — 92133 CPTRZD OPH DX IMG PST SGM ON: CPT | Performed by: OPHTHALMOLOGY

## 2024-01-10 PROCEDURE — 92025 CPTRIZED CORNEAL TOPOGRAPHY: CPT | Performed by: OPHTHALMOLOGY

## 2024-01-10 PROCEDURE — 99213 OFFICE O/P EST LOW 20 MIN: CPT | Performed by: OPHTHALMOLOGY

## 2024-01-10 ASSESSMENT — CONFRONTATIONAL VISUAL FIELD TEST (CVF)
OS_FINDINGS: FULL
OD_FINDINGS: FULL

## 2024-01-10 ASSESSMENT — DECREASING TEAR LAKE - SEVERITY SCORE
OS_DEC_TEARLAKE: T
OD_DEC_TEARLAKE: T

## 2024-01-10 ASSESSMENT — SUPERFICIAL PUNCTATE KERATITIS (SPK)
OD_SPK: ABSENT
OS_SPK: ABSENT

## 2024-01-11 ASSESSMENT — REFRACTION_CURRENTRX
OS_SPHERE: +1.75
OD_OVR_VA: 20/
OS_OVR_VA: 20/
OD_SPHERE: +1.75

## 2024-01-11 ASSESSMENT — REFRACTION_MANIFEST
OD_ADD: +2.50
OD_VA2: 20/25+2
OS_VA1: 20/25+2
OD_CYLINDER: -1.25
OU_VA: 20/20-2
OD_SPHERE: PLANO
OS_AXIS: 060
OS_VA2: 20/25+2
OD_VA1: 20/25+2
OS_SPHERE: -0.75
OD_AXIS: 100
OS_CYLINDER: -0.75
OS_ADD: +2.50

## 2024-01-11 ASSESSMENT — SPHEQUIV_DERIVED
OS_SPHEQUIV: -1.125
OD_SPHEQUIV: 0.25
OS_SPHEQUIV: -0.5

## 2024-01-11 ASSESSMENT — REFRACTION_AUTOREFRACTION
OS_SPHERE: +0.25
OS_CYLINDER: -1.50
OD_AXIS: 161
OS_AXIS: 147
OD_SPHERE: +1.00
OD_CYLINDER: -1.50

## 2024-01-15 ENCOUNTER — DOCTOR'S OFFICE (OUTPATIENT)
Dept: URBAN - NONMETROPOLITAN AREA CLINIC 1 | Facility: CLINIC | Age: 69
Setting detail: OPHTHALMOLOGY
End: 2024-01-15
Payer: COMMERCIAL

## 2024-01-15 ENCOUNTER — OPTICAL OFFICE (OUTPATIENT)
Dept: URBAN - NONMETROPOLITAN AREA CLINIC 4 | Facility: CLINIC | Age: 69
Setting detail: OPHTHALMOLOGY
End: 2024-01-15
Payer: COMMERCIAL

## 2024-01-15 DIAGNOSIS — H52.201: ICD-10-CM

## 2024-01-15 DIAGNOSIS — H52.4: ICD-10-CM

## 2024-01-15 PROBLEM — H35.033 HYPERTENSIVE RETINOPATHY; BOTH EYES: Status: ACTIVE | Noted: 2024-01-10

## 2024-01-15 PROCEDURE — V2203 LENS SPHCYL BIFOCAL 4.00D/.1: HCPCS | Performed by: OPTOMETRIST

## 2024-01-15 PROCEDURE — V2020 VISION SVCS FRAMES PURCHASES: HCPCS | Performed by: OPTOMETRIST

## 2024-01-15 PROCEDURE — V2750 ANTI-REFLECTIVE COATING: HCPCS | Mod: LT | Performed by: OPTOMETRIST

## 2024-01-15 PROCEDURE — V2781 PROGRESSIVE LENS PER LENS: HCPCS | Mod: T3 | Performed by: OPTOMETRIST

## 2024-01-15 PROCEDURE — V2750 ANTI-REFLECTIVE COATING: HCPCS | Mod: T1 | Performed by: OPTOMETRIST

## 2024-01-15 PROCEDURE — 92012 INTRM OPH EXAM EST PATIENT: CPT | Performed by: OPTOMETRIST

## 2024-01-15 PROCEDURE — V2781 PROGRESSIVE LENS PER LENS: HCPCS | Mod: LT | Performed by: OPTOMETRIST

## 2024-01-15 ASSESSMENT — REFRACTION_MANIFEST
OD_VA2: 20/25+2
OS_AXIS: 155
OU_VA: 20/25+2
OS_VA1: 20/30
OD_ADD: +2.50
OS_VA2: 20/30
OS_SPHERE: +0.25
OD_CYLINDER: -2.00
OD_AXIS: 155
OS_ADD: +2.50
OD_SPHERE: +1.25
OS_CYLINDER: -1.75
OD_VA1: 20/25+2

## 2024-01-15 ASSESSMENT — SUPERFICIAL PUNCTATE KERATITIS (SPK)
OS_SPK: ABSENT
OD_SPK: ABSENT

## 2024-01-15 ASSESSMENT — CONFRONTATIONAL VISUAL FIELD TEST (CVF)
OD_FINDINGS: FULL
OS_FINDINGS: FULL

## 2024-01-15 ASSESSMENT — REFRACTION_AUTOREFRACTION
OD_SPHERE: +1.25
OD_CYLINDER: -1.75
OS_AXIS: 156
OD_AXIS: 156
OS_CYLINDER: -1.50
OS_SPHERE: PLANO

## 2024-01-15 ASSESSMENT — REFRACTION_CURRENTRX
OD_OVR_VA: 20/
OD_SPHERE: +1.75
OS_OVR_VA: 20/
OS_SPHERE: +1.75

## 2024-01-15 ASSESSMENT — SPHEQUIV_DERIVED
OS_SPHEQUIV: -0.625
OD_SPHEQUIV: 0.375
OD_SPHEQUIV: 0.25

## 2024-01-15 ASSESSMENT — DECREASING TEAR LAKE - SEVERITY SCORE
OD_DEC_TEARLAKE: T
OS_DEC_TEARLAKE: T

## 2024-02-19 ENCOUNTER — HOSPITAL ENCOUNTER (OUTPATIENT)
Dept: RADIOLOGY | Facility: CLINIC | Age: 69
Discharge: HOME | End: 2024-02-19
Payer: MEDICARE

## 2024-02-19 VITALS — BODY MASS INDEX: 32.33 KG/M2 | HEIGHT: 67 IN | WEIGHT: 206 LBS

## 2024-02-19 DIAGNOSIS — N61.1 ABSCESS OF THE BREAST AND NIPPLE: ICD-10-CM

## 2024-02-19 PROCEDURE — 76642 ULTRASOUND BREAST LIMITED: CPT

## 2024-02-19 PROCEDURE — G0279 TOMOSYNTHESIS, MAMMO: HCPCS

## 2024-02-19 PROCEDURE — 77066 DX MAMMO INCL CAD BI: CPT

## 2024-03-13 ENCOUNTER — OPTICAL OFFICE (OUTPATIENT)
Dept: URBAN - NONMETROPOLITAN AREA CLINIC 4 | Facility: CLINIC | Age: 69
Setting detail: OPHTHALMOLOGY
End: 2024-03-13

## 2024-03-13 DIAGNOSIS — H52.4: ICD-10-CM

## 2024-03-13 DIAGNOSIS — H52.201: ICD-10-CM

## 2024-03-13 PROCEDURE — V2781 PROGRESSIVE LENS PER LENS: HCPCS | Performed by: OPTOMETRIST

## 2024-03-13 PROCEDURE — V2750 ANTI-REFLECTIVE COATING: HCPCS | Performed by: OPTOMETRIST

## 2024-03-13 PROCEDURE — V2762 POLARIZATION, ANY LENS: HCPCS | Mod: LT | Performed by: OPTOMETRIST

## 2024-03-13 PROCEDURE — V2020 VISION SVCS FRAMES PURCHASES: HCPCS | Performed by: OPTOMETRIST

## 2024-03-13 PROCEDURE — V2762 POLARIZATION, ANY LENS: HCPCS | Performed by: OPTOMETRIST

## 2024-03-13 PROCEDURE — V2781 PROGRESSIVE LENS PER LENS: HCPCS | Mod: LT | Performed by: OPTOMETRIST

## 2024-03-21 DIAGNOSIS — M48.062 SPINAL STENOSIS OF LUMBAR REGION WITH NEUROGENIC CLAUDICATION: ICD-10-CM

## 2024-03-21 DIAGNOSIS — M54.16 LUMBAR RADICULOPATHY: ICD-10-CM

## 2024-03-22 RX ORDER — GABAPENTIN 300 MG/1
300 CAPSULE ORAL 3 TIMES DAILY
Qty: 90 CAPSULE | Refills: 2 | Status: SHIPPED | OUTPATIENT
Start: 2024-03-22

## 2024-03-22 NOTE — PROGRESS NOTES
Assessment  1. Spinal stenosis of lumbar region with neurogenic claudication  -     gabapentin (NEURONTIN) 300 mg capsule; Take 1 capsule (300 mg total) by mouth 3 (three) times a day  -     Case request operating room: BLOCK / INJECTION CAUDAL; Standing  -     Case request operating room: BLOCK / [address]  -     Ambulatory referral to Neurosurgery; Future    2. Lumbar radiculopathy  -     gabapentin (NEURONTIN) 300 mg capsule; Take 1 capsule (300 mg total) by mouth 3 (three) times a day  -     Case request operating room: BLOCK / INJECTION CAUDAL; Standing  -     Case request operating room: BLOCK / INJECTION CAUDAL    Greater than 90% relief of pain with improved ability to participate with IADLs after Bilateral L3, L4, L5 medial branch blocks #1 and #2 and with subsequent radiofrequency lesioning of medial branch nerves performed 6/15/23; describes now proximal radicular features in left L4 and L5 dermatome and shopping cart sign. On MRI Multilevel degenerative change without high-grade canal or foraminal stenosis as detailed, worse at level L5-S1 with bilateral lateral recess stenosis impacting S1 nerve roots. Correlates for S1 radiculopathy. Moderate bilateral lateral recess narrowing at L4-5 also contributory. No significant relief with recent ILESI at L5-S1. Counseled with regard to PT, obtaining MRI. Previously reported the following symptomatology:    Axial low back pain described primarily by arthritic features. Aching, nagging, indolent, stabbing, throbbing features in axial low back without radicular components. 5/5 strength bilaterally, negative SLR. Positive facet loading maneuvers in lumbar spine elicited pain, positive tenderness to palpation over lumbar paraspinal muscles. Findings correlate with prominent lumbar facet arthropathy seen throughout axial low back on x-ray. Currently she is neurologically intact without gait instability, saddle anesthesia or bowel/bladder abnormality. Risks, benefits alternative to medial branch blocks and subsequent radiofrequency ablation if successful thoroughly discussed with patient. Handouts provided questions answered to patient satisfaction. Plan  -Caudal VENICE; f/u 2 weeks post procedure  -NSGY referral given spinal stenosis with neurogenic claudication; counseled regarding red flag sxs  -gabapentin 300 mg t.i.d. Ordered for patient; counseled regarding sedative effects of taking this medication and provided up titration calendar. Counseled not to take medication while driving or operating heavy machinery/using stairs  -Celebrex 200 mg b.i.d. prn pain previously prescribed. Patient educated regarding bleeding risk of taking this medication as well as not taking any other nonsteroidal anti-inflammatory medications while taking this medication; counseled thoroughly regarding potential risk of Cardiovascular injury, Kidney injury, Gastrointestinal ulceration/bleeding. Patient voiced understanding  -script provided for formal physical therapy for lumbar facet arthropathy, radiculopathy; Physician directed home exercise plan as per [employer]OS demonstrated and handouts provided that patient plans to participate with for 1 hour, twice a week for the next 6 weeks. There are risks associated with opioid medications, including dependence, addiction and tolerance. The patient understands and agrees to use these medications only as prescribed. Potential side effects of the medications include, but are not limited to, constipation, drowsiness, addiction, impaired judgment and risk of fatal overdose if not taken as prescribed. The patient was warned against driving while taking sedation medications or operating heavy machinery. The patient voiced understanding. Sharing medications is a felony. At this point in time, the patient is showing no signs of addiction, abuse, diversion or suicidal ideation.      [place] Prescription Drug Monitoring Program report was reviewed and was appropriate      Complete risks and benefits including bleeding, infection, tissue reaction, nerve injury and allergic reaction were discussed. The approach was demonstrated using models and literature was provided. Verbal and written consent was obtained. My impressions and treatment recommendations were discussed in detail with the patient who verbalized understanding and had no further questions. Discharge instructions were provided. I personally saw and examined the patient and I agree with the above discussed plan of care. New Medications Ordered This Visit   Medications   • Coenzyme Q10 (CO Q 10 PO)     Sig: Take by mouth   • gabapentin (NEURONTIN) 300 mg capsule     Sig: Take 1 capsule (300 mg total) by mouth 3 (three) times a day     Dispense:  90 capsule     Refill:  2       History of Present Illness    Greater than 90% relief of pain with improved ability to participate with IADLs after Bilateral L3, L4, L5 medial branch blocks #1 and #2 and with subsequent radiofrequency lesioning of medial branch nerves performed 6/15/23; describes now proximal radicular features in left L4 and L5 dermatome and shopping cart sign. No significant relief with recent ILESI at L5-S1. Previously reported the following symptomatology:    Leia Gong is a 79 y.o. female with pmhx o fprior cerebral aneurysm, XOL, obesity, presenting with chronic low back pain described primarily as arthritic in nature. She describes 8/10 low back pain that is worse in the mornings and worse at the end of the day. The pain is characterized by achy, nagging, indolent, crampy, stabbing pain in her axial low back. The patient describes that the pain is worse with standing for long periods of time on hard surfaces as well as with walking. The patient is a very active individual and feels as though this pain compromises his participation with independent activities of daily living.  The pain can be debilitating at times and contribute to significant disability, compromising overall activity and independent activities of daily living. She has not yet tried PT for her pain. Medications the patient has tried in the past include nsaids, tylenol. She describes no radicular symptoms and has good strength. She denies any weakness numbness or paresthesias. The patient denies any bowel or bladder dysfunction, saddle anesthesia or gait instability as well. I have personally reviewed and/or updated the patient's past medical history, past surgical history, family history, social history, current medications, allergies, and vital signs today. Review of Systems   Constitutional: Positive for activity change. HENT: Negative. Eyes: Negative. Respiratory: Negative. Cardiovascular: Negative. Gastrointestinal: Negative. Endocrine: Negative. Genitourinary: Negative. Musculoskeletal: Positive for arthralgias, back pain and myalgias. Negative for gait problem. Skin: Negative. Allergic/Immunologic: Negative. Neurological: Negative for weakness and numbness. Hematological: Negative. Psychiatric/Behavioral: Negative. All other systems reviewed and are negative.       Patient Active Problem List   Diagnosis   • Chest pain   • Hyperlipidemia   • History of cerebral aneurysm repair   • Tobacco abuse   • Lumbar spondylosis   • Spinal stenosis of lumbar region with neurogenic claudication   • Lumbar radiculopathy       Past Medical History:   Diagnosis Date   • Anxiety    • Cerebral aneurysm    • Hyperlipidemia        Past Surgical History:   Procedure Laterality Date   • BREAST CYST ASPIRATION Right 09/02/2022    cyst that abscessed   • CARDIAC CATHETERIZATION      2005   • CEREBRAL ANEURYSM REPAIR  07/1997    at [place]   • CHOLECYSTECTOMY     • EPIDURAL BLOCK INJECTION N/A 8/3/2023    Procedure: L5-S1 ILESI;  Surgeon: Beth Moreno MD;  Location: [facility];  Service: Pain Management    • NERVE BLOCK Bilateral 5/11/2023    Procedure: BLOCK MEDIAL BRANCH L3, L4, L5 #1;  Surgeon: Viktoria Wilson MD;  Location: [facility];  Service: Pain Management    • NERVE BLOCK Bilateral 5/30/2023    Procedure: BLOCK MEDIAL BRANCH L3, L4, L5 #2;  Surgeon: Viktoria Wilson MD;  Location: [facility];  Service: Pain Management    • RHIZOTOMY Bilateral 6/15/2023    Procedure: RHIZOTOMY LUMBAR Medial branch nerves L3, L4, L5;  Surgeon: Viktoria Wilson MD;  Location: [facility];  Service: Pain Management        Family History   Problem Relation Age of Onset   • No Known Problems Mother    • Prostate cancer Father    • No Known Problems Daughter    • No Known Problems Daughter    • No Known Problems Maternal Grandmother    • No Known Problems Maternal Grandfather    • No Known Problems Paternal Grandmother    • No Known Problems Paternal Grandfather    • No Known Problems Paternal Aunt        Social History     Occupational History   • Not on file   Tobacco Use   • Smoking status: Every Day     Packs/day: 0.25     Years: 25.00     Total pack years: 6.25     Types: Cigarettes   • Smokeless tobacco: Never   Substance and Sexual Activity   • Alcohol use: Never   • Drug use: Never   • Sexual activity: Not on file       Current Outpatient Medications on File Prior to Visit   Medication Sig   • ALPRAZolam (XANAX) 1 mg tablet Pt states, takes for anxiety prn   • B Complex Vitamins (VITAMIN B COMPLEX) TABS Take 2 tablets by mouth daily    • celecoxib (CeleBREX) 200 mg capsule    • Cholecalciferol 25 MCG (1000 UT) capsule Take 1,000 Units by mouth daily    • Coenzyme Q10 (CO Q 10 PO) Take by mouth   • FLUoxetine (PROzac) 40 MG capsule Take by mouth daily    • lansoprazole (PREVACID) 30 mg capsule Take by mouth daily    • Multiple Vitamins-Minerals (CENTRUM SILVER ULTRA WOMENS) TABS Take 1 tablet by mouth daily    • Omega-3 Fatty Acids (OMEGA-3 FISH OIL) 1000 MG CAPS Take 1 capsule by mouth daily   • Progesterone 200 MG CAPS Take 1 capsule by mouth every evening   • ramelteon (ROZEREM) 8 mg tablet 8 mg daily at bedtime    • simvastatin (ZOCOR) 40 mg tablet Take 40 mg by mouth daily at bedtime    • tiZANidine (ZANAFLEX) 4 mg tablet Take 1 tablet by mouth every 6 (six) hours as needed   • traZODone (DESYREL) 50 mg tablet Take 50 mg by mouth daily at bedtime    • valACYclovir (VALTREX) 1,000 mg tablet TAKE 2 TABLETS BY MOUTH EVERY 12 HOURS AS DIRECTED   • vitamin A 10,000 units capsule Take 10,000 Units by mouth daily    • vitamin E, tocopherol, 400 units capsule Take 400 Units by mouth daily      No current facility-administered medications on file prior to visit. Allergies   Allergen Reactions   • Zolpidem Other (See Comments)     Felt like speeding. • Phenylephrine-Acetaminophen Other (See Comments)     Heart races     Physical Exam    /98 (BP Location: Left arm, Patient Position: Sitting, Cuff Size: Adult)   Pulse 72   Temp 97.6 °F (36.4 °C)   Ht 5' 7" (1.702 m)   Wt 95.3 kg (210 lb)   SpO2 96%   BMI 32.89 kg/m²     Constitutional: normal, well developed, well nourished, alert, in no distress and non-toxic and no overt pain behavior. and obese  Eyes: anicteric  HEENT: grossly intact  Neck: supple, symmetric, trachea midline and no masses   Pulmonary:even and unlabored  Cardiovascular:No edema or pitting edema present  Skin:Normal without rashes or lesions and well hydrated  Psychiatric:Mood and affect appropriate  Neurologic:Cranial Nerves II-XII grossly intact Sensation grossly intact; no clonus negative morales's. Reflexes 2+ and brisk. SLR negative bilaterally. Musculoskeletal:normal gait. 5/5 strength bilaterally with AROM in lower extremities. Normal heel toe and tip toe walking. Significant pain with lumbar facet loading bilaterally and with lateral spine rotation. ttp over lumbar paraspinal muscles. Negative alyson's test, negative gaenslen's negative SIJ loading bilaterally.     Imaging    MRI LUMBAR SPINE WITHOUT CONTRAST     INDICATION: M47.816: Spondylosis without myelopathy or radiculopathy, lumbar region  M54.16: Radiculopathy, lumbar region.     COMPARISON:  None.     TECHNIQUE:  Multiplanar, multisequence imaging of the lumbar spine was performed. .        IMAGE QUALITY:  Diagnostic     FINDINGS:     VERTEBRAL BODIES:  There are 5 lumbar type vertebral bodies. There is preserved normal lumbar lordosis. Minimal retrolisthesis at L5-S1.     There is Modic type I endplate degenerative signal change at L1-2. Modic type II endplate degenerative signal change at levels L4-5 and L5-S1. Scattered endplate Schmorl's nodes.     SACRUM:  Normal signal within the sacrum. No evidence of insufficiency or stress fracture.     DISTAL CORD AND CONUS:  Normal size and signal within the distal cord and conus.     PARASPINAL SOFT TISSUES:  Paraspinal soft tissues are unremarkable.     LOWER THORACIC DISC SPACES: Mild noncompressive lower thoracic degenerative change.     LUMBAR DISC SPACES:     L1-L2: Disc bulge. There is left foraminal disc protrusion. Moderate bilateral facet arthropathy and ligamentum flavum thickening. Mild canal stenosis. Mild left foraminal narrowing.     L2-L3: Disc bulge. Moderate facet arthropathy. Mild canal stenosis. Mild bilateral foraminal narrowing.     L3-L4: Minimal disc bulge. Moderate facet arthropathy. Minor canal narrowing. No significant foraminal stenosis.     L4-L5: Disc bulge. Moderate bilateral facet arthropathy. Bilateral ligamentum flavum thickening. Moderate bilateral lateral recess narrowing and mild canal stenosis. Mild bilateral foraminal narrowing.     L5-S1: Disc bulge. There is central disc protrusion. Severe bilateral facet arthropathy. There is bilateral lateral recess stenosis impacting S1 nerve roots. Mild canal stenosis.  Mild bilateral foraminal narrowing.     OTHER FINDINGS: Indeterminate subcentimeter left renal T2 hyperintense cortical lesion, statistically favored to represent benign cyst.     IMPRESSION:     1.  Multilevel degenerative change without high-grade canal or foraminal stenosis as detailed, worse at level L5-S1 with bilateral lateral recess stenosis impacting S1 nerve roots. Correlate for S1 radiculopathy. 2.  Moderate bilateral lateral recess narrowing at L4-5. Kulwinder Jorgensen MD - 02/06/2023   Formatting of this note might be different from the original.   EXAM   XR L SPINE AP AND LATERAL-   2/3/2023 3:18 pm     HISTORY   Provided clinical history: "worsening low back pain Lumbar DDD"     TECHNIQUE   Three views of the lumbar spine were obtained. COMPARISON   Radiographs dated March 11, 2014. FINDINGS   Five lumbar-type vertebral bodies.  Multilevel intervertebral disc degeneration, moderate-to-severe at L1-2, L2-3, L4-5, and L5-S1.  Multilevel facet osteoarthritis, greatest near the lumbosacral junction. No fracture or aggressive osseous lesion identified.  Mild-to-moderate osteoarthritis of both hips.  Surgical clips in the right upper abdomen. IMPRESSION   IMPRESSION   Degenerative findings as above. - - -

## 2024-06-14 ENCOUNTER — OPTICAL OFFICE (OUTPATIENT)
Dept: URBAN - NONMETROPOLITAN AREA CLINIC 4 | Facility: CLINIC | Age: 69
Setting detail: OPHTHALMOLOGY
End: 2024-06-14

## 2024-06-14 DIAGNOSIS — H52.201: ICD-10-CM

## 2024-06-14 PROCEDURE — V2750 ANTI-REFLECTIVE COATING: HCPCS | Performed by: OPTOMETRIST

## 2024-06-14 PROCEDURE — V2781 PROGRESSIVE LENS PER LENS: HCPCS | Performed by: OPTOMETRIST

## 2024-06-14 PROCEDURE — V2020 VISION SVCS FRAMES PURCHASES: HCPCS | Performed by: OPTOMETRIST

## 2024-06-14 PROCEDURE — V2781 PROGRESSIVE LENS PER LENS: HCPCS | Mod: LT | Performed by: OPTOMETRIST

## 2024-06-17 ENCOUNTER — OPTICAL OFFICE (OUTPATIENT)
Dept: URBAN - NONMETROPOLITAN AREA CLINIC 4 | Facility: CLINIC | Age: 69
Setting detail: OPHTHALMOLOGY
End: 2024-06-17

## 2024-06-17 DIAGNOSIS — H52.201: ICD-10-CM

## 2024-06-17 PROCEDURE — V2762 POLARIZATION, ANY LENS: HCPCS | Mod: LT | Performed by: OPTOMETRIST

## 2024-06-17 PROCEDURE — V2020 VISION SVCS FRAMES PURCHASES: HCPCS | Performed by: OPTOMETRIST

## 2024-06-17 PROCEDURE — V2781 PROGRESSIVE LENS PER LENS: HCPCS | Performed by: OPTOMETRIST

## 2024-06-17 PROCEDURE — V2781 PROGRESSIVE LENS PER LENS: HCPCS | Mod: LT | Performed by: OPTOMETRIST

## 2024-06-17 PROCEDURE — V2762 POLARIZATION, ANY LENS: HCPCS | Performed by: OPTOMETRIST

## 2024-06-24 DIAGNOSIS — M48.062 SPINAL STENOSIS OF LUMBAR REGION WITH NEUROGENIC CLAUDICATION: ICD-10-CM

## 2024-06-24 DIAGNOSIS — M54.16 LUMBAR RADICULOPATHY: ICD-10-CM

## 2024-06-24 RX ORDER — GABAPENTIN 300 MG/1
300 CAPSULE ORAL 3 TIMES DAILY
Qty: 90 CAPSULE | Refills: 2 | Status: SHIPPED | OUTPATIENT
Start: 2024-06-24

## 2024-07-23 ENCOUNTER — DOCTOR'S OFFICE (OUTPATIENT)
Dept: URBAN - NONMETROPOLITAN AREA CLINIC 1 | Facility: CLINIC | Age: 69
Setting detail: OPHTHALMOLOGY
End: 2024-07-23
Payer: MEDICARE

## 2024-07-23 DIAGNOSIS — H35.033: ICD-10-CM

## 2024-07-23 DIAGNOSIS — H40.013: ICD-10-CM

## 2024-07-23 DIAGNOSIS — H16.222: ICD-10-CM

## 2024-07-23 DIAGNOSIS — H43.813: ICD-10-CM

## 2024-07-23 DIAGNOSIS — H40.033: ICD-10-CM

## 2024-07-23 DIAGNOSIS — H16.221: ICD-10-CM

## 2024-07-23 PROBLEM — H16.223 DRY EYE SYNDROME K SICCA; BOTH EYES: Status: ACTIVE | Noted: 2024-07-23

## 2024-07-23 PROCEDURE — 99213 OFFICE O/P EST LOW 20 MIN: CPT | Performed by: OPHTHALMOLOGY

## 2024-07-23 PROCEDURE — 76514 ECHO EXAM OF EYE THICKNESS: CPT | Performed by: OPHTHALMOLOGY

## 2024-07-23 PROCEDURE — 92083 EXTENDED VISUAL FIELD XM: CPT | Performed by: OPHTHALMOLOGY

## 2024-07-23 PROCEDURE — 92250 FUNDUS PHOTOGRAPHY W/I&R: CPT | Performed by: OPHTHALMOLOGY

## 2024-07-23 ASSESSMENT — CONFRONTATIONAL VISUAL FIELD TEST (CVF)
OS_FINDINGS: FULL
OD_FINDINGS: FULL

## 2024-09-21 DIAGNOSIS — M54.16 LUMBAR RADICULOPATHY: ICD-10-CM

## 2024-09-21 DIAGNOSIS — M48.062 SPINAL STENOSIS OF LUMBAR REGION WITH NEUROGENIC CLAUDICATION: ICD-10-CM

## 2024-09-22 RX ORDER — GABAPENTIN 300 MG/1
300 CAPSULE ORAL 3 TIMES DAILY
Qty: 90 CAPSULE | Refills: 2 | Status: SHIPPED | OUTPATIENT
Start: 2024-09-22

## 2024-10-17 ENCOUNTER — ANESTHESIA EVENT (OUTPATIENT)
Dept: ANESTHESIOLOGY | Facility: HOSPITAL | Age: 69
End: 2024-10-17

## 2024-10-17 ENCOUNTER — ANESTHESIA (OUTPATIENT)
Dept: ANESTHESIOLOGY | Facility: HOSPITAL | Age: 69
End: 2024-10-17

## 2024-10-17 NOTE — ANESTHESIA PREPROCEDURE EVALUATION
Procedure:  PRE-OP ONLY    Relevant Problems   CARDIO   (+) Chest pain   (+) Hyperlipidemia      MUSCULOSKELETAL   (+) Lumbar spondylosis        Physical Exam    Airway    Mallampati score: II  TM Distance: >3 FB  Neck ROM: full     Dental   No notable dental hx     Cardiovascular  Cardiovascular exam normal    Pulmonary  Pulmonary exam normal     Other Findings  post-pubertal.  Smoking    Cerebral aneurysm repair      12/2023  NSR  Anesthesia Plan  ASA Score- 2     Anesthesia Type- IV sedation with anesthesia with ASA Monitors.         Additional Monitors:     Airway Plan:            Plan Factors-Exercise tolerance (METS): >4 METS.    Chart reviewed. EKG reviewed. Imaging results reviewed. Existing labs reviewed. Patient summary reviewed.    Patient is a current smoker.  Patient instructed to abstain from smoking on day of procedure. Patient did not smoke on day of surgery.    Obstructive sleep apnea risk education given perioperatively.        Induction- intravenous.    Postoperative Plan-     Perioperative Resuscitation Plan - Level 1 - Full Code.       Informed Consent-

## 2024-10-18 ENCOUNTER — ANESTHESIA EVENT (OUTPATIENT)
Dept: GASTROENTEROLOGY | Facility: HOSPITAL | Age: 69
End: 2024-10-18
Payer: MEDICARE

## 2024-10-18 ENCOUNTER — HOSPITAL ENCOUNTER (OUTPATIENT)
Dept: GASTROENTEROLOGY | Facility: HOSPITAL | Age: 69
Setting detail: OUTPATIENT SURGERY
End: 2024-10-18
Attending: HOSPITALIST
Payer: MEDICARE

## 2024-10-18 ENCOUNTER — ANESTHESIA (OUTPATIENT)
Dept: GASTROENTEROLOGY | Facility: HOSPITAL | Age: 69
End: 2024-10-18
Payer: MEDICARE

## 2024-10-18 VITALS
RESPIRATION RATE: 20 BRPM | HEIGHT: 66 IN | BODY MASS INDEX: 31.82 KG/M2 | HEART RATE: 68 BPM | WEIGHT: 198 LBS | OXYGEN SATURATION: 97 % | DIASTOLIC BLOOD PRESSURE: 80 MMHG | TEMPERATURE: 97.8 F | SYSTOLIC BLOOD PRESSURE: 157 MMHG

## 2024-10-18 DIAGNOSIS — K21.9 GASTRO-ESOPHAGEAL REFLUX DISEASE WITHOUT ESOPHAGITIS: ICD-10-CM

## 2024-10-18 PROCEDURE — 88305 TISSUE EXAM BY PATHOLOGIST: CPT | Performed by: PATHOLOGY

## 2024-10-18 PROCEDURE — 88342 IMHCHEM/IMCYTCHM 1ST ANTB: CPT | Performed by: PATHOLOGY

## 2024-10-18 RX ORDER — LOSARTAN POTASSIUM 50 MG/1
50 TABLET ORAL EVERY MORNING
COMMUNITY
Start: 2024-10-01

## 2024-10-18 RX ORDER — PROPOFOL 10 MG/ML
INJECTION, EMULSION INTRAVENOUS AS NEEDED
Status: DISCONTINUED | OUTPATIENT
Start: 2024-10-18 | End: 2024-10-18

## 2024-10-18 RX ORDER — LIDOCAINE HYDROCHLORIDE 10 MG/ML
INJECTION, SOLUTION EPIDURAL; INFILTRATION; INTRACAUDAL; PERINEURAL AS NEEDED
Status: DISCONTINUED | OUTPATIENT
Start: 2024-10-18 | End: 2024-10-18

## 2024-10-18 RX ORDER — ROSUVASTATIN CALCIUM 20 MG/1
20 TABLET, COATED ORAL DAILY
COMMUNITY
Start: 2024-04-30

## 2024-10-18 RX ORDER — UREA 10 %
LOTION (ML) TOPICAL
COMMUNITY

## 2024-10-18 RX ORDER — SODIUM CHLORIDE, SODIUM LACTATE, POTASSIUM CHLORIDE, CALCIUM CHLORIDE 600; 310; 30; 20 MG/100ML; MG/100ML; MG/100ML; MG/100ML
INJECTION, SOLUTION INTRAVENOUS CONTINUOUS PRN
Status: DISCONTINUED | OUTPATIENT
Start: 2024-10-18 | End: 2024-10-18

## 2024-10-18 RX ORDER — LEVOTHYROXINE SODIUM 137 UG/1
137 TABLET ORAL DAILY
COMMUNITY
Start: 2024-10-01

## 2024-10-18 RX ORDER — DICLOFENAC SODIUM 75 MG/1
75 TABLET, DELAYED RELEASE ORAL 2 TIMES DAILY
COMMUNITY
Start: 2024-08-24

## 2024-10-18 RX ADMIN — PROPOFOL 100 MG: 10 INJECTION, EMULSION INTRAVENOUS at 09:22

## 2024-10-18 RX ADMIN — PROPOFOL 150 MCG/KG/MIN: 10 INJECTION, EMULSION INTRAVENOUS at 09:23

## 2024-10-18 RX ADMIN — SODIUM CHLORIDE, SODIUM LACTATE, POTASSIUM CHLORIDE, AND CALCIUM CHLORIDE: .6; .31; .03; .02 INJECTION, SOLUTION INTRAVENOUS at 09:15

## 2024-10-18 RX ADMIN — LIDOCAINE HYDROCHLORIDE 50 MG: 10 INJECTION, SOLUTION EPIDURAL; INFILTRATION; INTRACAUDAL; PERINEURAL at 09:22

## 2024-10-18 NOTE — ANESTHESIA POSTPROCEDURE EVALUATION
Post-Op Assessment Note    CV Status:  Stable    Pain management: adequate       Mental Status:  Sleepy   Hydration Status:  Euvolemic   PONV Controlled:  Controlled   Airway Patency:  Patent     Post Op Vitals Reviewed: Yes    No anethesia notable event occurred.    Staff: CRNA           Last Filed PACU Vitals:  Vitals Value Taken Time   Temp 97.5    Pulse 74    BP 95/42    Resp 16    SpO2 98        Modified Julia:  Activity: 2 (10/18/2024  8:28 AM)  Respiration: 2 (10/18/2024  8:28 AM)  Circulation: 2 (10/18/2024  8:28 AM)  Consciousness: 2 (10/18/2024  8:28 AM)  Oxygen Saturation: 2 (10/18/2024  8:28 AM)  Modified Julia Score: 10 (10/18/2024  8:28 AM)

## 2024-10-18 NOTE — ANESTHESIA POSTPROCEDURE EVALUATION
Post-Op Assessment Note    Last Filed PACU Vitals:  Vitals Value Taken Time   Temp 97.8 °F (36.6 °C) 10/18/24 0953   Pulse 62 10/18/24 0953   /69 10/18/24 0953   Resp 16 10/18/24 0953   SpO2 96 % 10/18/24 0953       Modified Julia:  Activity: 2 (10/18/2024 10:08 AM)  Respiration: 2 (10/18/2024 10:08 AM)  Circulation: 2 (10/18/2024 10:08 AM)  Consciousness: 2 (10/18/2024 10:08 AM)  Oxygen Saturation: 2 (10/18/2024 10:08 AM)  Modified Julia Score: 10 (10/18/2024 10:08 AM)

## 2024-10-18 NOTE — ANESTHESIA PREPROCEDURE EVALUATION
Procedure:  EGD    Relevant Problems   CARDIO   (+) Chest pain   (+) Hyperlipidemia      MUSCULOSKELETAL   (+) Lumbar spondylosis        Physical Exam    Airway    Mallampati score: II  TM Distance: >3 FB  Neck ROM: full     Dental   No notable dental hx     Cardiovascular  Cardiovascular exam normal    Pulmonary  Pulmonary exam normal     Other Findings  post-pubertal.  Smoking    Cerebral aneurysm repair      12/2023  NSR  Anesthesia Plan  ASA Score- 2     Anesthesia Type- IV sedation with anesthesia with ASA Monitors.         Additional Monitors:     Airway Plan:            Plan Factors-Exercise tolerance (METS): >4 METS.    Chart reviewed. EKG reviewed. Imaging results reviewed. Existing labs reviewed. Patient summary reviewed.    Patient is a current smoker.  Patient instructed to abstain from smoking on day of procedure. Patient did not smoke on day of surgery.    Obstructive sleep apnea risk education given perioperatively.        Induction- intravenous.    Postoperative Plan-     Perioperative Resuscitation Plan - Level 1 - Full Code.       Informed Consent- Anesthetic plan and risks discussed with patient.  I personally reviewed this patient with the CRNA. Discussed and agreed on the Anesthesia Plan with the CRNA..

## 2024-10-18 NOTE — H&P
Procedure(s):    Esophagogastroduodenuoscopy with the indication(s) of Dysphagia       Vitals:    10/18/24 0843   BP: 127/77   Pulse: 67   Resp: 18   Temp: 98.2 °F (36.8 °C)   SpO2: 94%       Physical Exam:  Physical Exam  Eyes:      Conjunctiva/sclera: Conjunctivae normal.   Cardiovascular:      Rate and Rhythm: Normal rate.   Pulmonary:      Effort: Pulmonary effort is normal.   Abdominal:      Palpations: Abdomen is soft.   Neurological:      General: No focal deficit present.      Mental Status: She is alert.   Psychiatric:         Mood and Affect: Mood normal.              Endoscopy Pre-Procedure Assessment:  Prior to the procedure, the patient is identified.  The patient's history, medications, and allergies have been reviewed.  The patient is competent.     Consent:    We have discussed the procedure in detail. We reviewed risks, benefits and alternative as well as potentional complications including and not limited to medication side effect , infection, bleeding, perforation and the potential need for surgery, ICU admission, CPR, as well as the need for blood product transfusion. Patient verbalized understanding and agreement. All patient questions were answered.     After reviewed the risks and benefits, the patient is deemed in satisfactory condition to undergo the procedure.  The anesthesia plan is to use monitored anesthesia care (MAC).      10/18/24

## 2024-10-25 PROCEDURE — 88342 IMHCHEM/IMCYTCHM 1ST ANTB: CPT | Performed by: PATHOLOGY

## 2024-10-25 PROCEDURE — 88305 TISSUE EXAM BY PATHOLOGIST: CPT | Performed by: PATHOLOGY

## 2024-10-29 ENCOUNTER — HOSPITAL ENCOUNTER (OUTPATIENT)
Dept: RADIOLOGY | Facility: HOSPITAL | Age: 69
Discharge: HOME | End: 2024-10-29
Payer: MEDICARE

## 2024-10-29 DIAGNOSIS — R13.12 DYSPHAGIA, OROPHARYNGEAL PHASE: ICD-10-CM

## 2024-10-29 DIAGNOSIS — K21.9 GASTRO-ESOPHAGEAL REFLUX DISEASE WITHOUT ESOPHAGITIS: ICD-10-CM

## 2024-10-29 PROCEDURE — 74220 X-RAY XM ESOPHAGUS 1CNTRST: CPT

## 2024-11-26 ENCOUNTER — TELEPHONE (OUTPATIENT)
Age: 69
End: 2024-11-26

## 2024-11-26 NOTE — TELEPHONE ENCOUNTER
S/w pt. Pt was last seen for a Caudal VENICE on 9/21/23 with relief until now. Pt was last seen in the office on 8/30/23. Pt is requesting to repeat procedure but advised since it has been over a year will need an office visit first to re-eval her pain and for insurance to authorize. Pt scheduled for 1/3 which is first available. Aptt desk noted to call pt is a sooner appt becomes available.     Please advise- If there is any option for a sooner appt?

## 2024-11-26 NOTE — TELEPHONE ENCOUNTER
Caller: Pt    Doctor: Austin    Reason for call: Pt was last seen for injection in 9/2023. Has increased left sided sciatica pain. Would like to know if she needs to be seen or can she schedule injection.     Please assist.     Call back#: [phone]      Sent tussionex, should be using mucinex, albuterol.  Let me know if not improving by day 5-7

## 2024-11-27 ENCOUNTER — PREP FOR PROCEDURE (OUTPATIENT)
Dept: PAIN MEDICINE | Facility: CLINIC | Age: 69
End: 2024-11-27

## 2024-11-27 DIAGNOSIS — M54.16 LUMBAR RADICULOPATHY: Primary | ICD-10-CM

## 2024-11-27 NOTE — TELEPHONE ENCOUNTER
Patient is scheduled for 12/3/24. Patient aware of instructions. No food/drink one hour prior. Wear comfortable clothing. A  is required. Denies blood thinners. Continue all other prescribed medications on day of procedure. Call if prescribed an antibiotic or become sick. Refrain from vaccinations two weeks prior and two weeks after. Patient aware [facility] nurse will call day prior with instructions and report time. Call with questions.

## 2024-11-27 NOTE — TELEPHONE ENCOUNTER
Attempted to reach pt. Detailed VMMLOM advising pt our procedure  will be reaching out to get her scheduled for procedure. CB# provided if there are questions.

## 2024-12-02 ENCOUNTER — TELEPHONE (OUTPATIENT)
Dept: PAIN MEDICINE | Facility: CLINIC | Age: 69
End: 2024-12-02

## 2025-01-03 DIAGNOSIS — M48.062 SPINAL STENOSIS OF LUMBAR REGION WITH NEUROGENIC CLAUDICATION: ICD-10-CM

## 2025-01-03 DIAGNOSIS — M54.16 LUMBAR RADICULOPATHY: ICD-10-CM

## 2025-01-06 RX ORDER — GABAPENTIN 300 MG/1
300 CAPSULE ORAL 3 TIMES DAILY
Qty: 90 CAPSULE | Refills: 2 | Status: SHIPPED | OUTPATIENT
Start: 2025-01-06

## 2025-01-27 ENCOUNTER — DOCTOR'S OFFICE (OUTPATIENT)
Dept: URBAN - NONMETROPOLITAN AREA CLINIC 1 | Facility: CLINIC | Age: 70
Setting detail: OPHTHALMOLOGY
End: 2025-01-27
Payer: MEDICARE

## 2025-01-27 DIAGNOSIS — H40.033: ICD-10-CM

## 2025-01-27 DIAGNOSIS — S05.11XA: ICD-10-CM

## 2025-01-27 DIAGNOSIS — H40.013: ICD-10-CM

## 2025-01-27 DIAGNOSIS — H43.813: ICD-10-CM

## 2025-01-27 PROCEDURE — 92134 CPTRZ OPH DX IMG PST SGM RTA: CPT | Performed by: OPTOMETRIST

## 2025-01-27 PROCEDURE — 92014 COMPRE OPH EXAM EST PT 1/>: CPT | Performed by: OPTOMETRIST

## 2025-01-27 ASSESSMENT — DECREASING TEAR LAKE - SEVERITY SCORE
OS_DEC_TEARLAKE: T
OD_DEC_TEARLAKE: T

## 2025-01-27 ASSESSMENT — PACHYMETRY
OD_CT_CORRECTION: 1
OS_CT_CORRECTION: 1
OS_CT_UM: 538
OD_CT_UM: 531

## 2025-01-27 ASSESSMENT — REFRACTION_MANIFEST
OS_VA2: 20/30
OS_AXIS: 155
OD_AXIS: 155
OS_SPHERE: +0.25
OS_CYLINDER: -1.75
OD_SPHERE: +1.25
OS_VA1: 20/30
OD_VA2: 20/25+2
OD_VA1: 20/25+2
OS_ADD: +2.50
OU_VA: 20/25+2
OD_ADD: +2.50
OD_CYLINDER: -2.00

## 2025-01-27 ASSESSMENT — KERATOMETRY
OD_K1POWER_DIOPTERS: 43.75
OS_K1POWER_DIOPTERS: 45.75
OD_AXISANGLE_DEGREES: 062
OD_K2POWER_DIOPTERS: 46.75
OS_K2POWER_DIOPTERS: 47.00
METHOD_AUTO_MANUAL: MANUAL
OS_AXISANGLE_DEGREES: 081

## 2025-01-27 ASSESSMENT — REFRACTION_AUTOREFRACTION
OS_CYLINDER: -1.50
OD_CYLINDER: -1.50
OS_AXIS: 149
OD_AXIS: 168
OD_SPHERE: +0.25
OS_SPHERE: 0.00

## 2025-01-27 ASSESSMENT — REFRACTION_CURRENTRX
OD_ADD: +2.00
OD_CYLINDER: -2.00
OS_AXIS: 149
OS_OVR_VA: 20/
OS_VPRISM_DIRECTION: PROGS
OD_VPRISM_DIRECTION: PROGS
OD_OVR_VA: 20/
OD_SPHERE: +1.50
OD_AXIS: 156
OS_CYLINDER: -1.75
OS_ADD: +2.00
OS_SPHERE: +0.25

## 2025-01-27 ASSESSMENT — VISUAL ACUITY
OD_BCVA: 20/25+2
OS_BCVA: 20/30-1

## 2025-01-27 ASSESSMENT — CONFRONTATIONAL VISUAL FIELD TEST (CVF)
OD_FINDINGS: FULL
OS_FINDINGS: FULL

## 2025-01-27 ASSESSMENT — SUPERFICIAL PUNCTATE KERATITIS (SPK)
OS_SPK: ABSENT
OD_SPK: ABSENT

## 2025-02-24 ENCOUNTER — APPOINTMENT (EMERGENCY)
Dept: CT IMAGING | Facility: HOSPITAL | Age: 70
DRG: 069 | End: 2025-02-24
Payer: MEDICARE

## 2025-02-24 ENCOUNTER — HOSPITAL ENCOUNTER (INPATIENT)
Facility: HOSPITAL | Age: 70
LOS: 1 days | Discharge: HOME | DRG: 069 | End: 2025-02-25
Attending: EMERGENCY MEDICINE | Admitting: FAMILY MEDICINE
Payer: MEDICARE

## 2025-02-24 DIAGNOSIS — R53.1 ACUTE RIGHT-SIDED WEAKNESS: Primary | ICD-10-CM

## 2025-02-24 LAB
ANION GAP SERPL CALCULATED.3IONS-SCNC: 6 MMOL/L (ref 4–13)
APTT PPP: 23 SECONDS (ref 23–34)
ATRIAL RATE: 61 BPM
BUN SERPL-MCNC: 18 MG/DL (ref 5–25)
CALCIUM SERPL-MCNC: 9.3 MG/DL (ref 8.4–10.2)
CARDIAC TROPONIN I PNL SERPL HS: 3 NG/L (ref ?–50)
CHLORIDE SERPL-SCNC: 104 MMOL/L (ref 96–108)
CO2 SERPL-SCNC: 28 MMOL/L (ref 21–32)
CREAT SERPL-MCNC: 0.68 MG/DL (ref 0.6–1.3)
ERYTHROCYTE [DISTWIDTH] IN BLOOD BY AUTOMATED COUNT: 12.8 % (ref 11.6–15.1)
GFR SERPL CREATININE-BSD FRML MDRD: 90 ML/MIN/1.73SQ M
GLUCOSE SERPL-MCNC: 121 MG/DL (ref 65–140)
GLUCOSE SERPL-MCNC: 125 MG/DL (ref 65–140)
HCT VFR BLD AUTO: 40.7 % (ref 34.8–46.1)
HGB BLD-MCNC: 13.9 G/DL (ref 11.5–15.4)
INR PPP: 0.97 (ref 0.85–1.19)
MCH RBC QN AUTO: 31.2 PG (ref 26.8–34.3)
MCHC RBC AUTO-ENTMCNC: 34.2 G/DL (ref 31.4–37.4)
MCV RBC AUTO: 92 FL (ref 82–98)
P AXIS: 37 DEGREES
PLATELET # BLD AUTO: 325 THOUSANDS/UL (ref 149–390)
PMV BLD AUTO: 9.6 FL (ref 8.9–12.7)
POTASSIUM SERPL-SCNC: 3.9 MMOL/L (ref 3.5–5.3)
PR INTERVAL: 170 MS
PROTHROMBIN TIME: 13.3 SECONDS (ref 12.3–15)
QRS AXIS: 28 DEGREES
QRSD INTERVAL: 90 MS
QT INTERVAL: 422 MS
QTC INTERVAL: 424 MS
RBC # BLD AUTO: 4.45 MILLION/UL (ref 3.81–5.12)
SODIUM SERPL-SCNC: 138 MMOL/L (ref 135–147)
T WAVE AXIS: 46 DEGREES
VENTRICULAR RATE: 61 BPM
WBC # BLD AUTO: 12.72 THOUSAND/UL (ref 4.31–10.16)

## 2025-02-24 PROCEDURE — 82948 REAGENT STRIP/BLOOD GLUCOSE: CPT

## 2025-02-24 PROCEDURE — 84484 ASSAY OF TROPONIN QUANT: CPT | Performed by: EMERGENCY MEDICINE

## 2025-02-24 PROCEDURE — 99285 EMERGENCY DEPT VISIT HI MDM: CPT

## 2025-02-24 PROCEDURE — 93005 ELECTROCARDIOGRAM TRACING: CPT

## 2025-02-24 PROCEDURE — 70498 CT ANGIOGRAPHY NECK: CPT

## 2025-02-24 PROCEDURE — 99285 EMERGENCY DEPT VISIT HI MDM: CPT | Performed by: EMERGENCY MEDICINE

## 2025-02-24 PROCEDURE — 80048 BASIC METABOLIC PNL TOTAL CA: CPT | Performed by: EMERGENCY MEDICINE

## 2025-02-24 PROCEDURE — 85610 PROTHROMBIN TIME: CPT | Performed by: EMERGENCY MEDICINE

## 2025-02-24 PROCEDURE — 93010 ELECTROCARDIOGRAM REPORT: CPT | Performed by: INTERNAL MEDICINE

## 2025-02-24 PROCEDURE — 85027 COMPLETE CBC AUTOMATED: CPT | Performed by: EMERGENCY MEDICINE

## 2025-02-24 PROCEDURE — 85730 THROMBOPLASTIN TIME PARTIAL: CPT | Performed by: EMERGENCY MEDICINE

## 2025-02-24 PROCEDURE — 99223 1ST HOSP IP/OBS HIGH 75: CPT | Performed by: PSYCHIATRY & NEUROLOGY

## 2025-02-24 PROCEDURE — 36415 COLL VENOUS BLD VENIPUNCTURE: CPT | Performed by: EMERGENCY MEDICINE

## 2025-02-24 PROCEDURE — 70496 CT ANGIOGRAPHY HEAD: CPT

## 2025-02-24 PROCEDURE — 99223 1ST HOSP IP/OBS HIGH 75: CPT | Performed by: FAMILY MEDICINE

## 2025-02-24 RX ORDER — TRAMADOL HYDROCHLORIDE 50 MG/1
50 TABLET ORAL ONCE
Status: COMPLETED | OUTPATIENT
Start: 2025-02-24 | End: 2025-02-24

## 2025-02-24 RX ORDER — GABAPENTIN 300 MG/1
300 CAPSULE ORAL 3 TIMES DAILY
Status: DISCONTINUED | OUTPATIENT
Start: 2025-02-24 | End: 2025-02-25 | Stop reason: HOSPADM

## 2025-02-24 RX ORDER — PROGESTERONE 200 MG/1
1 CAPSULE ORAL EVERY EVENING
Status: DISCONTINUED | OUTPATIENT
Start: 2025-02-24 | End: 2025-02-25 | Stop reason: HOSPADM

## 2025-02-24 RX ORDER — ONDANSETRON 2 MG/ML
4 INJECTION INTRAMUSCULAR; INTRAVENOUS EVERY 6 HOURS PRN
Status: DISCONTINUED | OUTPATIENT
Start: 2025-02-24 | End: 2025-02-25 | Stop reason: HOSPADM

## 2025-02-24 RX ORDER — PANTOPRAZOLE SODIUM 40 MG/1
40 TABLET, DELAYED RELEASE ORAL 2 TIMES DAILY
Status: DISCONTINUED | OUTPATIENT
Start: 2025-02-24 | End: 2025-02-25 | Stop reason: HOSPADM

## 2025-02-24 RX ORDER — HEPARIN SODIUM 5000 [USP'U]/ML
5000 INJECTION, SOLUTION INTRAVENOUS; SUBCUTANEOUS EVERY 8 HOURS SCHEDULED
Status: DISCONTINUED | OUTPATIENT
Start: 2025-02-24 | End: 2025-02-25 | Stop reason: HOSPADM

## 2025-02-24 RX ORDER — TRAZODONE HYDROCHLORIDE 50 MG/1
50 TABLET ORAL
Status: DISCONTINUED | OUTPATIENT
Start: 2025-02-24 | End: 2025-02-25 | Stop reason: HOSPADM

## 2025-02-24 RX ORDER — ASPIRIN 325 MG
325 TABLET ORAL ONCE
Status: DISCONTINUED | OUTPATIENT
Start: 2025-02-24 | End: 2025-02-24

## 2025-02-24 RX ORDER — LORAZEPAM 2 MG/ML
0.5 INJECTION INTRAMUSCULAR ONCE
Status: COMPLETED | OUTPATIENT
Start: 2025-02-24 | End: 2025-02-24

## 2025-02-24 RX ORDER — ALPRAZOLAM 0.5 MG
1 TABLET ORAL 2 TIMES DAILY PRN
Status: DISCONTINUED | OUTPATIENT
Start: 2025-02-24 | End: 2025-02-25 | Stop reason: HOSPADM

## 2025-02-24 RX ORDER — MULTIVIT WITH MINERALS/LUTEIN
1000 TABLET ORAL DAILY
COMMUNITY

## 2025-02-24 RX ORDER — PANTOPRAZOLE SODIUM 40 MG/1
40 TABLET, DELAYED RELEASE ORAL 2 TIMES DAILY
COMMUNITY

## 2025-02-24 RX ORDER — NICOTINE 21 MG/24HR
1 PATCH, TRANSDERMAL 24 HOURS TRANSDERMAL DAILY
Status: DISCONTINUED | OUTPATIENT
Start: 2025-02-24 | End: 2025-02-25 | Stop reason: HOSPADM

## 2025-02-24 RX ORDER — CLOPIDOGREL BISULFATE 75 MG/1
75 TABLET ORAL DAILY
Status: DISCONTINUED | OUTPATIENT
Start: 2025-02-25 | End: 2025-02-25 | Stop reason: HOSPADM

## 2025-02-24 RX ORDER — ASPIRIN 81 MG/1
162 TABLET, CHEWABLE ORAL ONCE
Status: DISCONTINUED | OUTPATIENT
Start: 2025-02-24 | End: 2025-02-24

## 2025-02-24 RX ORDER — ASPIRIN 81 MG/1
81 TABLET ORAL DAILY
Status: DISCONTINUED | OUTPATIENT
Start: 2025-02-25 | End: 2025-02-25 | Stop reason: HOSPADM

## 2025-02-24 RX ORDER — LOSARTAN POTASSIUM 50 MG/1
50 TABLET ORAL EVERY MORNING
Status: DISCONTINUED | OUTPATIENT
Start: 2025-02-25 | End: 2025-02-25 | Stop reason: HOSPADM

## 2025-02-24 RX ORDER — OXYBUTYNIN CHLORIDE 5 MG/1
5 TABLET, EXTENDED RELEASE ORAL DAILY
Status: DISCONTINUED | OUTPATIENT
Start: 2025-02-25 | End: 2025-02-25 | Stop reason: HOSPADM

## 2025-02-24 RX ORDER — ATORVASTATIN CALCIUM 40 MG/1
40 TABLET, FILM COATED ORAL EVERY EVENING
Status: DISCONTINUED | OUTPATIENT
Start: 2025-02-24 | End: 2025-02-25 | Stop reason: HOSPADM

## 2025-02-24 RX ORDER — ASPIRIN 81 MG/1
324 TABLET, CHEWABLE ORAL ONCE
Status: COMPLETED | OUTPATIENT
Start: 2025-02-24 | End: 2025-02-24

## 2025-02-24 RX ORDER — RAMELTEON 8 MG/1
8 TABLET ORAL
Status: DISCONTINUED | OUTPATIENT
Start: 2025-02-24 | End: 2025-02-25 | Stop reason: HOSPADM

## 2025-02-24 RX ORDER — SOLIFENACIN SUCCINATE 10 MG/1
10 TABLET, FILM COATED ORAL EVERY MORNING
COMMUNITY

## 2025-02-24 RX ORDER — ASCORBIC ACID 500 MG
1000 TABLET ORAL DAILY
Status: DISCONTINUED | OUTPATIENT
Start: 2025-02-25 | End: 2025-02-25 | Stop reason: HOSPADM

## 2025-02-24 RX ADMIN — PANTOPRAZOLE SODIUM 40 MG: 40 TABLET, DELAYED RELEASE ORAL at 20:39

## 2025-02-24 RX ADMIN — ALPRAZOLAM 1 MG: 0.5 TABLET ORAL at 18:29

## 2025-02-24 RX ADMIN — GABAPENTIN 300 MG: 300 CAPSULE ORAL at 21:36

## 2025-02-24 RX ADMIN — ASPIRIN 81 MG CHEWABLE TABLET 324 MG: 81 TABLET CHEWABLE at 15:54

## 2025-02-24 RX ADMIN — TRAZODONE HYDROCHLORIDE 50 MG: 50 TABLET ORAL at 20:39

## 2025-02-24 RX ADMIN — TRAMADOL HYDROCHLORIDE 50 MG: 50 TABLET, COATED ORAL at 15:54

## 2025-02-24 RX ADMIN — HEPARIN SODIUM 5000 UNITS: 5000 INJECTION INTRAVENOUS; SUBCUTANEOUS at 17:38

## 2025-02-24 RX ADMIN — IOHEXOL 85 ML: 350 INJECTION, SOLUTION INTRAVENOUS at 14:12

## 2025-02-24 RX ADMIN — LORAZEPAM 0.5 MG: 2 INJECTION INTRAMUSCULAR; INTRAVENOUS at 17:32

## 2025-02-24 RX ADMIN — HEPARIN SODIUM 5000 UNITS: 5000 INJECTION INTRAVENOUS; SUBCUTANEOUS at 21:40

## 2025-02-24 RX ADMIN — AMOXICILLIN AND CLAVULANATE POTASSIUM 1 TABLET: 875; 125 TABLET, FILM COATED ORAL at 20:39

## 2025-02-24 RX ADMIN — GABAPENTIN 300 MG: 300 CAPSULE ORAL at 17:32

## 2025-02-24 NOTE — ASSESSMENT & PLAN NOTE
Symptoms started on 2/23/2025 at 5:30 PM-with heaviness of right arm, then feeling abnormal sensation on rest of the face which lasted about 3 minutes, later on the right side of the body was feeling heavy  Stroke alert initiated  NIH score is 1  Imaging so far negative  Patient had history of clipping of aneurysm for which she is being followed up with neurologist at [facility]-postprocedure, per patient, had multiple MRI, and being monitored with regular MRA  Will give loading dose of aspirin, after that continue aspirin and Plavix, statin  Follow MRI, echo, lipid panel, A1c level, speech-patient has good gag reflex and passed bedside swallow evaluation with this provider  PT OT  Per patient, she is active, go to the gym every other day, at least an hour without any difficulties  Has family history of a stroke from mother

## 2025-02-24 NOTE — PLAN OF CARE
Problem: PAIN - ADULT  Goal: Verbalizes/displays adequate comfort level or baseline comfort level  Description: Interventions:  - Encourage patient to monitor pain and request assistance  - Assess pain using appropriate pain scale  - Administer analgesics based on type and severity of pain and evaluate response  - Implement non-pharmacological measures as appropriate and evaluate response  - Consider cultural and social influences on pain and pain management  - Notify physician/advanced practitioner if interventions unsuccessful or patient reports new pain  Outcome: Progressing     Problem: INFECTION - ADULT  Goal: Absence or prevention of progression during hospitalization  Description: INTERVENTIONS:  - Assess and monitor for signs and symptoms of infection  - Monitor lab/diagnostic results  - Monitor all insertion sites, i.e. indwelling lines, tubes, and drains  - Monitor endotracheal if appropriate and nasal secretions for changes in amount and color  - [place] appropriate cooling/warming therapies per order  - Administer medications as ordered  - Instruct and encourage patient and family to use good hand hygiene technique  - Identify and instruct in appropriate isolation precautions for identified infection/condition  Outcome: Progressing  Goal: Absence of fever/infection during neutropenic period  Description: INTERVENTIONS:  - Monitor WBC    Outcome: Progressing     Problem: SAFETY ADULT  Goal: Patient will remain free of falls  Description: INTERVENTIONS:  - Educate patient/family on patient safety including physical limitations  - Instruct patient to call for assistance with activity   - Consult OT/PT to assist with strengthening/mobility   - Keep Call bell within reach  - Keep bed low and locked with side rails adjusted as appropriate  - Keep care items and personal belongings within reach  - Initiate and maintain comfort rounds  - Make Fall Risk Sign visible to staff  - Offer Toileting every 2 Hours,  in advance of need  - Initiate/Maintain bed/chair alarm  - Obtain necessary fall risk management equipment: na  - Apply yellow socks and bracelet for high fall risk patients  - Consider moving patient to room near nurses station  Outcome: Progressing  Goal: Maintain or return to baseline ADL function  Description: INTERVENTIONS:  -  Assess patient's ability to carry out ADLs; assess patient's baseline for ADL function and identify physical deficits which impact ability to perform ADLs (bathing, care of mouth/teeth, toileting, grooming, dressing, etc.)  - Assess/evaluate cause of self-care deficits   - Assess range of motion  - Assess patient's mobility; develop plan if impaired  - Assess patient's need for assistive devices and provide as appropriate  - Encourage maximum independence but intervene and supervise when necessary  - Involve family in performance of ADLs  - Assess for home care needs following discharge   - Consider OT consult to assist with ADL evaluation and planning for discharge  - Provide patient education as appropriate  Outcome: Progressing  Goal: Maintains/Returns to pre admission functional level  Description: INTERVENTIONS:  - Perform AM-PAC 6 Click Basic Mobility/ Daily Activity assessment daily.  - Set and communicate daily mobility goal to care team and patient/family/caregiver.   - Collaborate with rehabilitation services on mobility goals if consulted  - Perform Range of Motion 2 times a day.  - Reposition patient every 2 hours.  - Dangle patient 2 times a day  - Stand patient 2 times a day  - Ambulate patient 2 times a day  - Out of bed to chair 2 times a day   - Out of bed for meals 2 times a day  - Out of bed for toileting  - Record patient progress and toleration of activity level   Outcome: Progressing     Problem: DISCHARGE PLANNING  Goal: Discharge to home or other facility with appropriate resources  Description: INTERVENTIONS:  - Identify barriers to discharge w/patient and  caregiver  - Arrange for needed discharge resources and transportation as appropriate  - Identify discharge learning needs (meds, wound care, etc.)  - Arrange for interpretive services to assist at discharge as needed  - Refer to Case Management Department for coordinating discharge planning if the patient needs post-hospital services based on physician/advanced practitioner order or complex needs related to functional status, cognitive ability, or social support system  Outcome: Progressing     Problem: Knowledge Deficit  Goal: Patient/family/caregiver demonstrates understanding of disease process, treatment plan, medications, and discharge instructions  Description: Complete learning assessment and assess knowledge base.  Interventions:  - Provide teaching at level of understanding  - Provide teaching via preferred learning methods  Outcome: Progressing

## 2025-02-24 NOTE — CONSULTS
TeleConsultation - Stroke   Cecilia Tamayo 69 y.o. female MRN: [id number]  Unit/Bed#: TR 13B Encounter: [id number]      REQUIRED DOCUMENTATION:     1. This service was provided via Telemedicine.  2. Provider located in [place].  3. TeleMed provider: Laura Fallon MD.  4. Identify all parties in room with patient during tele consult:  None  5. After connecting through televideo, patient was identified by name and date of birth and assistant checked wristband.  Patient was then informed that this was a Telemedicine visit and that the exam was being conducted confidentially over secure lines. My office door was closed. No one else was in the room.  Patient acknowledged consent and understanding of privacy and security of the Telemedicine visit, and gave us permission to have the assistant stay in the room in order to assist with the history and to conduct the exam.  I informed the patient that I have reviewed their record in Epic and presented the opportunity for them to ask any questions regarding the visit today.  The patient agreed to participate.       Assessment & Plan   Assessment:   Right sided weakness:  Sudden onset of right sided heaviness. It was initially associated with an electrical feeling on right side of the face.    Weakness has improved drastically, but not fully resolved    CTA shows multiple findings such as aneurysmal clipping and a known chronic previous ICA dissection on the left    -150 systolic      TNK Decision: Patient not a candidate. Unclear time of onset outside appropriate time window.    Plan:   Admit for stroke pathway.  Obtain MRI brain w/o contrast, TTE, HgbA1c and lipid panel  If MRI brain is negative, we recommend obtaining MRI cervical spine to rule out cervical stenosis  Control risk factors  DAPT for 21 days then monotherapy  High dose statin  PT    History of Present Illness     Reason for Consult / Principal Problem: weakness  Hx and PE limited by: none  Patient last known  well: 5:30 pm  Stroke alert called: 1:59 pm  Neurology time of arrival: immediate  HPI: Cecilia Tamayo is a 69 y.o. female who presents with weakness and heaviness of right side noted after she was breaking the ice. She noted a strange electrical sensation on right side of the face with the heaviness. This was transient. The weakness however persisted and she felt she had a hard time eating or using her hand. This morning she visited her PCP who advised to come here. She still feels weak in right foot, while not so much in right hand.    Inpatient consult to Neurology  Consult performed by: Laura Fallon MD  Consult ordered by: Marlon Hutchinson MD          Review of Systems  Complete ROS was done and is negative other than what is mentioned in HPI    Historical Information   Past Medical History:   Diagnosis Date    Anxiety     Cerebral aneurysm     Hyperlipidemia      Past Surgical History:   Procedure Laterality Date    BREAST CYST ASPIRATION Right 09/02/2022    cyst that abscessed    CARDIAC CATHETERIZATION      2005    CAUDAL BLOCK N/A 9/21/2023    Procedure: BLOCK / INJECTION CAUDAL;  Surgeon: Hollis Avila MD;  [facility];  Service: Pain Management     CEREBRAL ANEURYSM REPAIR  07/1997    at [place]    CHOLECYSTECTOMY      EPIDURAL BLOCK INJECTION N/A 8/3/2023    Procedure: L5-S1 ILESI;  Surgeon: Hollis Avila MD;  [facility];  Service: Pain Management     NERVE BLOCK Bilateral 5/11/2023    Procedure: BLOCK MEDIAL BRANCH L3, L4, L5 #1;  Surgeon: Hollis Avila MD;  [facility];  Service: Pain Management     NERVE BLOCK Bilateral 5/30/2023    Procedure: BLOCK MEDIAL BRANCH L3, L4, L5 #2;  Surgeon: Hollis Avila MD;  [facility];  Service: Pain Management     RHIZOTOMY Bilateral 6/15/2023    Procedure: RHIZOTOMY LUMBAR Medial branch nerves L3, L4, L5;  Surgeon: Hollis Avila MD;  [facility];  Service: Pain Management      Social History   Social History      Substance and Sexual Activity   Alcohol Use Never     Social History     Substance and Sexual Activity   Drug Use Never     E-Cigarette/Vaping     E-Cigarette/Vaping Substances     Social History     Tobacco Use   Smoking Status Every Day    Current packs/day: 0.25    Average packs/day: 0.3 packs/day for 25.0 years (6.3 ttl pk-yrs)    Types: Cigarettes   Smokeless Tobacco Never     Family History: Family history non-contributory    Review of previous medical records was completed.    Meds/Allergies   current meds:   Current Facility-Administered Medications:     ALPRAZolam (XANAX) tablet 1 mg, BID PRN    amoxicillin-clavulanate (AUGMENTIN) 875-125 mg per tablet 1 tablet, Q12H YADIRA    [START ON 2/25/2025] ascorbic acid (VITAMIN C) tablet 1,000 mg, Daily    [START ON 2/25/2025] aspirin (ECOTRIN LOW STRENGTH) EC tablet 81 mg, Daily    atorvastatin (LIPITOR) tablet 40 mg, QPM    [START ON 2/25/2025] clopidogrel (PLAVIX) tablet 75 mg, Daily    [START ON 2/25/2025] FLUoxetine (PROzac) capsule 40 mg, Daily    gabapentin (NEURONTIN) capsule 300 mg, TID    heparin (porcine) subcutaneous injection 5,000 Units, Q8H YADIRA    [START ON 2/25/2025] levothyroxine tablet 137 mcg, Daily    [START ON 2/25/2025] losartan (COZAAR) tablet 50 mg, QAM    nicotine (NICODERM CQ) 21 mg/24 hr TD 24 hr patch 1 patch, Daily    ondansetron (ZOFRAN) injection 4 mg, Q6H PRN    [START ON 2/25/2025] oxybutynin (DITROPAN-XL) 24 hr tablet 5 mg, Daily    pantoprazole (PROTONIX) EC tablet 40 mg, BID    Allergies   Allergen Reactions    Zolpidem Other (See Comments)     Felt like speeding.        Objective   Vitals:Blood pressure 134/77, pulse 60, temperature 98 °F (36.7 °C), temperature source Oral, resp. rate 15, weight 92.1 kg (203 lb 0.7 oz), SpO2 97%.,Body mass index is 32.77 kg/m².  No intake or output data in the 24 hours ending 02/24/25 1602    Invasive Devices:   Invasive Devices       Peripheral Intravenous Line  Duration             Peripheral  IV 02/24/25 Left Antecubital <1 day                    Physical Exam NAD  Skin: no seen lesions  HEENT: ATNC  Chest: breathing comfortably on room air  GI: no apparent distension.    Neurological Exam Alert and oriented for self, place and time. Memory is intact to recent and remote events. Language is intact to comprehension and expression. No neglect. Speech is normal. EOMI. Pupils are symmetric. Visual field appears intact. Face is symmetric. Tongue is midline. Able to move all extremities against gravity. No dysmetria noted.      NIHSS Time NIHSS was completed: 0    Modified Gamaliel Score:  0 (No baseline symptoms/disability)    Lab Results: CBC:   Results from last 7 days   Lab Units 02/24/25  1402   WBC Thousand/uL 12.72*   RBC Million/uL 4.45   HEMOGLOBIN g/dL 13.9   HEMATOCRIT % 40.7   MCV fL 92   PLATELETS Thousands/uL 325   , BMP/CMP:   Results from last 7 days   Lab Units 02/24/25  1402   SODIUM mmol/L 138   POTASSIUM mmol/L 3.9   CHLORIDE mmol/L 104   CO2 mmol/L 28   BUN mg/dL 18   CREATININE mg/dL 0.68   CALCIUM mg/dL 9.3   EGFR ml/min/1.73sq m 90   , Vitamin B12:   , HgBA1C:   , TSH:   , Coagulation:   Results from last 7 days   Lab Units 02/24/25  1402   INR  0.97   , Lipid Profile:     Imaging Studies: Results Review Statement: I personally reviewed the following image studies/reports in PACS and discussed pertinent findings with Radiology: CTA head and neck. My interpretation of the radiology images/reports is: as noted above.  EKG, Pathology, and Other Studies: Results Review Statement: No pertinent imaging studies reviewed.  VTE Prophylaxis: VTE covered by:  heparin (porcine), Subcutaneous, 5,000 Units at 02/24/25 1738       Code Status: Prior  Advance Directive and Living Will:      Power of :    POLST:      Counseling / Coordination of Care  N/A

## 2025-02-24 NOTE — H&P
H&P - Hospitalist   Name: Cecilia Tamayo 69 y.o. female I MRN: [id number]  Unit/Bed#: [facility] I Date of Admission: 2/24/2025   Date of Service: 2/24/2025 I Hospital Day: 0     Assessment & Plan  Acute right-sided weakness  Symptoms started on 2/23/2025 at 5:30 PM-with heaviness of right arm, then feeling abnormal sensation on rest of the face which lasted about 3 minutes, later on the right side of the body was feeling heavy  Stroke alert initiated  NIH score is 1  Imaging so far negative  Patient had history of clipping of aneurysm for which she is being followed up with neurologist at [facility]-postprocedure, per patient, had multiple MRI, and being monitored with regular MRA  Will give loading dose of aspirin, after that continue aspirin and Plavix, statin  Follow MRI, echo, lipid panel, A1c level, speech-patient has good gag reflex and passed bedside swallow evaluation with this provider  PT OT  Per patient, she is active, go to the gym every other day, at least an hour without any difficulties  Has family history of a stroke from mother  History of cerebral aneurysm repair  Patient followed up with neurology at [facility]      VTE Pharmacologic Prophylaxis: VTE Score: 3 Moderate Risk (Score 3-4) - Pharmacological DVT Prophylaxis Ordered: heparin.  Code Status: Level 1 - Full Code per patient  Discussion with family:  With patient.     Anticipated Length of Stay: Patient will be admitted on an inpatient basis with an anticipated length of stay of greater than 2 midnights secondary to monitorable condition.    History of Present Illness   Chief Complaint: Right-sided weakness and heaviness    Cecilia Tamayo is a 69 y.o. female with a PMH of cerebral aneurysm who presents with right-sided weakness and heaviness.  Per patient, she was breaking ice, after that, started feeling heaviness on the right hand and then she was feeling weird sensation on the right side of the face which lasted few minutes, then she started  feeling heaviness on the right side of the body.  Denies any dizziness, flash of lights, feels that hands and legs were heavy denies any recent trauma on the side.  Patient also reports 2 weeks ago she was having some chest discomfort.  Per patient, she goes to the gym regular basis, and workout at least 1 hour without any symptoms.  Has family history of a stroke from mother side.  Patient also has brain aneurysm being followed [facility] on regular basis.  All the symptoms started at 5:30 PM on 2/23/2025..    Review of Systems   Constitutional:  Positive for activity change. Negative for appetite change, chills, diaphoresis, fatigue, fever and unexpected weight change.   HENT:  Negative for congestion, sneezing, sore throat, tinnitus and trouble swallowing.    Respiratory:  Negative for apnea, choking and chest tightness.    Gastrointestinal:  Negative for abdominal distention, abdominal pain and anal bleeding.   Genitourinary:  Negative for difficulty urinating and dyspareunia.   Musculoskeletal:  Negative for arthralgias, back pain and gait problem.   Neurological:  Negative for dizziness, facial asymmetry and headaches.   Psychiatric/Behavioral:  Negative for decreased concentration.    All other systems reviewed and are negative.      Historical Information   Past Medical History:   Diagnosis Date    Anxiety     Cerebral aneurysm     Hyperlipidemia      Past Surgical History:   Procedure Laterality Date    BREAST CYST ASPIRATION Right 09/02/2022    cyst that abscessed    CARDIAC CATHETERIZATION      2005    CAUDAL BLOCK N/A 9/21/2023    Procedure: BLOCK / INJECTION CAUDAL;  Surgeon: Hollis Avila MD;  [facility];  Service: Pain Management     CEREBRAL ANEURYSM REPAIR  07/1997    at [place]    CHOLECYSTECTOMY      EPIDURAL BLOCK INJECTION N/A 8/3/2023    Procedure: L5-S1 ILESI;  Surgeon: Hollis Avila MD;  Location:  [facility];  Service: Pain Management     NERVE BLOCK Bilateral 5/11/2023     Procedure: BLOCK MEDIAL BRANCH L3, L4, L5 #1;  Surgeon: Hollis Avila MD;  [facility];  Service: Pain Management     NERVE BLOCK Bilateral 5/30/2023    Procedure: BLOCK MEDIAL BRANCH L3, L4, L5 #2;  Surgeon: Hollis Avila MD;  [facility];  Service: Pain Management     RHIZOTOMY Bilateral 6/15/2023    Procedure: RHIZOTOMY LUMBAR Medial branch nerves L3, L4, L5;  Surgeon: Hollis Avila MD;  [facility];  Service: Pain Management      Social History     Tobacco Use    Smoking status: Every Day     Current packs/day: 0.25     Average packs/day: 0.3 packs/day for 25.0 years (6.3 ttl pk-yrs)     Types: Cigarettes    Smokeless tobacco: Never   Substance and Sexual Activity    Alcohol use: Never    Drug use: Never    Sexual activity: Not on file     E-Cigarette/Vaping     E-Cigarette/Vaping Substances     Family History   Problem Relation Age of Onset    No Known Problems Mother     Prostate cancer Father     No Known Problems Daughter     No Known Problems Daughter     No Known Problems Maternal Grandmother     No Known Problems Maternal Grandfather     No Known Problems Paternal Grandmother     No Known Problems Paternal Grandfather     No Known Problems Paternal Aunt      Social History:  Marital Status: /Civil Union   Occupation: Unknown  Patient Pre-hospital Living Situation: Home  Patient Pre-hospital Level of Mobility: walks  Patient Pre-hospital Diet Restrictions: Cardiac diet    Meds/Allergies   I have reviewed home medications with patient personally.  Prior to Admission medications    Medication Sig Start Date End Date Taking? Authorizing Provider   ALPRAZolam (XANAX) 1 mg tablet Pt states, takes for anxiety prn 10/9/19   Historical Provider, MD   B Complex Vitamins (VITAMIN B COMPLEX) TABS Take 2 tablets by mouth daily  3/11/14   Historical Provider, MD   celecoxib (CeleBREX) 200 mg capsule  4/3/23   Historical Provider, MD   Cholecalciferol 25 MCG (1000 UT) capsule Take  1,000 Units by mouth daily  3/11/14   Historical Provider, MD   Coenzyme Q10 (CO Q 10 PO) Take by mouth    Historical Provider, MD   diclofenac (VOLTAREN) 75 mg EC tablet Take 75 mg by mouth 2 (two) times a day 8/24/24   Historical Provider, MD   FLUoxetine (PROzac) 40 MG capsule Take by mouth daily  11/1/19   Historical Provider, MD   gabapentin (NEURONTIN) 300 mg capsule TAKE 1 CAPSULE BY MOUTH THREE TIMES A DAY 1/6/25   Hollis Avila MD   lansoprazole (PREVACID) 30 mg capsule Take by mouth daily  11/1/19   Historical Provider, MD   levothyroxine 137 mcg tablet Take 137 mcg by mouth daily 10/1/24   Historical Provider, MD   losartan (COZAAR) 50 mg tablet Take 50 mg by mouth every morning 10/1/24   Historical Provider, MD   magnesium gluconate (MAGONATE) 500 mg tablet Take by mouth    Historical Provider, MD   Multiple Vitamins-Minerals (CENTRUM SILVER ULTRA WOMENS) TABS Take 1 tablet by mouth daily  3/11/14   Historical Provider, MD   Omega-3 Fatty Acids (OMEGA-3 FISH OIL) 1000 MG CAPS Take 1 capsule by mouth daily 3/11/14   Historical Provider, MD   Progesterone 200 MG CAPS Take 1 capsule by mouth every evening 3/3/23   Historical Provider, MD   ramelteon (ROZEREM) 8 mg tablet 8 mg daily at bedtime  12/31/19   Historical Provider, MD   rosuvastatin (CRESTOR) 20 MG tablet Take 20 mg by mouth daily 4/30/24   Historical Provider, MD   simvastatin (ZOCOR) 40 mg tablet Take 40 mg by mouth daily at bedtime  1/13/20   Historical Provider, MD   tiZANidine (ZANAFLEX) 4 mg tablet Take 1 tablet by mouth every 6 (six) hours as needed 1/22/23   Historical Provider, MD   traZODone (DESYREL) 50 mg tablet Take 50 mg by mouth daily at bedtime  11/29/19   Historical Provider, MD   valACYclovir (VALTREX) 1,000 mg tablet TAKE 2 TABLETS BY MOUTH EVERY 12 HOURS AS DIRECTED 11/5/22   Historical Provider, MD   vitamin A 10,000 units capsule Take 10,000 Units by mouth daily  7/23/18   Historical Provider, MD   vitamin E, tocopherol,  400 units capsule Take 400 Units by mouth daily  3/11/14   Historical Provider, MD     Allergies   Allergen Reactions    Zolpidem Other (See Comments)     Felt like speeding.        Objective :  Temp:  [98 °F (36.7 °C)-98.6 °F (37 °C)] 98 °F (36.7 °C)  HR:  [60-68] 62  BP: (119-161)/(66-85) 128/75  Resp:  [15-18] 16  SpO2:  [94 %-99 %] 97 %  O2 Device: None (Room air)    Physical Exam  Vitals and nursing note reviewed.   Constitutional:       Appearance: Normal appearance. She is not ill-appearing or diaphoretic.   HENT:      Mouth/Throat:      Mouth: Mucous membranes are moist.      Pharynx: No oropharyngeal exudate or posterior oropharyngeal erythema.   Eyes:      General: No scleral icterus.        Left eye: No discharge.      Extraocular Movements: Extraocular movements intact.      Conjunctiva/sclera: Conjunctivae normal.      Pupils: Pupils are equal, round, and reactive to light.   Cardiovascular:      Rate and Rhythm: Normal rate.      Heart sounds: No murmur heard.     No friction rub. No gallop.   Pulmonary:      Effort: Pulmonary effort is normal. No respiratory distress.      Breath sounds: No stridor. No wheezing or rhonchi.   Abdominal:      General: There is no distension.      Palpations: There is no mass.      Tenderness: There is no abdominal tenderness.      Hernia: No hernia is present.   Musculoskeletal:      Right lower leg: No edema.      Left lower leg: No edema.   Neurological:      Mental Status: She is alert and oriented to person, place, and time.      Cranial Nerves: No cranial nerve deficit.      Sensory: No sensory deficit.      Motor: No weakness.      Coordination: Coordination normal.      Comments: Romberg sign positive         Lines/Drains:            Lab Results: I have reviewed the following results:  Results from last 7 days   Lab Units 02/24/25  1402   WBC Thousand/uL 12.72*   HEMOGLOBIN g/dL 13.9   HEMATOCRIT % 40.7   PLATELETS Thousands/uL 325     Results from last 7 days    Lab Units 02/24/25  1402   SODIUM mmol/L 138   POTASSIUM mmol/L 3.9   CHLORIDE mmol/L 104   CO2 mmol/L 28   BUN mg/dL 18   CREATININE mg/dL 0.68   ANION GAP mmol/L 6   CALCIUM mg/dL 9.3   GLUCOSE RANDOM mg/dL 121     Results from last 7 days   Lab Units 02/24/25  1402   INR  0.97     Results from last 7 days   Lab Units 02/24/25  1404   POC GLUCOSE mg/dl 125     Lab Results   Component Value Date    HGBA1C 5.5 07/03/2024    HGBA1C 6.0 (H) 01/04/2024    HGBA1C 5.6 02/29/2020           Imaging Results Review: I reviewed radiology reports from this admission including: CT head.  Other Study Results Review: EKG was reviewed.     Administrative Statements       ** Please Note: This note has been constructed using a voice recognition system. **

## 2025-02-24 NOTE — ED PROVIDER NOTES
Time reflects when diagnosis was documented in both MDM as applicable and the Disposition within this note       Time User Action Codes Description Comment    2/24/2025  1:53 PM Marlon Hutchinson Add [R53.1] Acute right-sided weakness           ED Disposition       ED Disposition   Admit    Condition   Stable    Date/Time   Mon Feb 24, 2025  3:33 PM    Comment                  Assessment & Plan       Medical Decision Making  1350: Stroke alert called.  NIHSS 1 for right leg drift.  Symptoms present for the past 21 hours.  Not TNK candidate.  Patient does have a history of cerebral aneurysms with clipping.  Plan to complete stat CTA head and neck.  Discussed with stroke neurology.  Plan to complete basic labs.  Placed on the monitor.      1410: CTA head and neck reviewed with radiologist and stroke neurology.  Patient has remained stable throughout ED course.  Plan to give aspirin.  Stroke pathway admission.    Amount and/or Complexity of Data Reviewed  Labs: ordered.  Radiology: ordered.     Details: CTA head and neck  ECG/medicine tests: ordered and independent interpretation performed.     Details: Normal sinus rhythm 61 bpm, no acute ischemia.  Discussion of management or test interpretation with external provider(s): Dr. Fallon neurology  SLIM    Risk  OTC drugs.  Prescription drug management.  Decision regarding hospitalization.             Medications   iohexol (OMNIPAQUE) 350 MG/ML injection (MULTI-DOSE) 85 mL (85 mL Intravenous Given 2/24/25 1412)   aspirin chewable tablet 324 mg (324 mg Oral Given 2/24/25 1554)   traMADol (ULTRAM) tablet 50 mg (50 mg Oral Given 2/24/25 1554)   LORazepam (ATIVAN) injection 0.5 mg (has no administration in time range)       ED Risk Strat Scores         Stroke Assessment       Row Name 02/24/25 1350             NIH Stroke Scale    Interval Baseline      Level of Consciousness (1a.) 0      LOC Questions (1b.) 0      LOC Commands (1c.) 0      Best Gaze (2.) 0      Visual (3.) 0       Facial Palsy (4.) 0      Motor Arm, Left (5a.) 0      Motor Arm, Right (5b.) 0      Motor Leg, Left (6a.) 0      Motor Leg, Right (6b.) 1      Limb Ataxia (7.) 0      Sensory (8.) 0      Best Language (9.) 0      Dysarthria (10.) 0      Extinction and Inattention (11.) (Formerly Neglect) 0      Total 1                    Flowsheet Row Most Recent Value   Thrombolytic Decision Options    Thrombolytic Decision Patient not a candidate.   Patient is not a candidate options Symptoms resolved/clearly non disabling., Unclear time of onset outside appropriate time window.                            SBIRT 20yo+      Flowsheet Row Most Recent Value   Initial Alcohol Screen: US AUDIT-C     1. How often do you have a drink containing alcohol? 0 Filed at: 02/24/2025 1345   2. How many drinks containing alcohol do you have on a typical day you are drinking?  0 Filed at: 02/24/2025 1345   3b. FEMALE Any Age, or MALE 65+: How often do you have 4 or more drinks on one occassion? 0 Filed at: 02/24/2025 1345   Audit-C Score 0 Filed at: 02/24/2025 1345   CLIFTON: How many times in the past year have you...    Used an illegal drug or used a prescription medication for non-medical reasons? Never Filed at: 02/24/2025 1345                            History of Present Illness       Chief Complaint   Patient presents with    Extremity Weakness     Patient presents to the ED with complaints of right-sided weakness that began yesterday around 1700. She states that she was outside chopping ice before experiencing symptoms. She states it started in her hand and has since spread to her leg.        Past Medical History:   Diagnosis Date    Anxiety     Cerebral aneurysm     Hyperlipidemia       Past Surgical History:   Procedure Laterality Date    BREAST CYST ASPIRATION Right 09/02/2022    cyst that abscessed    CARDIAC CATHETERIZATION      2005    CAUDAL BLOCK N/A 9/21/2023    Procedure: BLOCK / INJECTION CAUDAL;  Surgeon: Hollis Avila MD;   Location: [facility];  Service: Pain Management     CEREBRAL ANEURYSM REPAIR  07/1997    at [place]    CHOLECYSTECTOMY      EPIDURAL BLOCK INJECTION N/A 8/3/2023    Procedure: L5-S1 ILESI;  Surgeon: Hollis Avila MD;  Location: [facility];  Service: Pain Management     NERVE BLOCK Bilateral 5/11/2023    Procedure: BLOCK MEDIAL BRANCH L3, L4, L5 #1;  Surgeon: Hollis Avila MD;  Location: [facility];  Service: Pain Management     NERVE BLOCK Bilateral 5/30/2023    Procedure: BLOCK MEDIAL BRANCH L3, L4, L5 #2;  Surgeon: Hollis Avila MD;  Location: [facility];  Service: Pain Management     RHIZOTOMY Bilateral 6/15/2023    Procedure: RHIZOTOMY LUMBAR Medial branch nerves L3, L4, L5;  Surgeon: Hollis Avila MD;  Location: [facility];  Service: Pain Management       Family History   Problem Relation Age of Onset    No Known Problems Mother     Prostate cancer Father     No Known Problems Daughter     No Known Problems Daughter     No Known Problems Maternal Grandmother     No Known Problems Maternal Grandfather     No Known Problems Paternal Grandmother     No Known Problems Paternal Grandfather     No Known Problems Paternal Aunt       Social History     Tobacco Use    Smoking status: Every Day     Current packs/day: 0.25     Average packs/day: 0.3 packs/day for 25.0 years (6.3 ttl pk-yrs)     Types: Cigarettes    Smokeless tobacco: Never   Substance Use Topics    Alcohol use: Never    Drug use: Never      E-Cigarette/Vaping      E-Cigarette/Vaping Substances      I have reviewed and agree with the history as documented.       History provided by:  Medical records and patient  CVA/TIA-like Symptoms  Presenting symptoms: sensory loss and weakness    Presenting symptoms: no confusion and no headaches    Presenting symptoms comment:  Right arm and right leg weakness.  Patient states she was chopping ice off the driveway at 5 PM, developed sensation in her right thumb weakness, she states went back into the house and had  feelings of heaviness in the right arm and right leg.  Patient had a PCP appointment routinely today, mention these symptoms, sent to the emergency room.  Sensory loss:     Location:  R facial    Severity:  Mild (Fleeting, lasted about 3 minutes, this occurred at 1730 yesterday)  Date/time of last known well:  2/23/2025 5:00 PM  Onset quality:  Sudden  Duration:  21 hours  Timing:  Constant  Progression:  Unchanged  Similar to previous episodes: no    Associated symptoms: paresthesias    Associated symptoms: no chest pain, no dizziness, no fever, no nausea, no seizures and no vomiting        Review of Systems   Constitutional:  Negative for chills, fatigue and fever.   HENT:  Negative for ear discharge, ear pain, rhinorrhea and sore throat.    Eyes:  Negative for pain and visual disturbance.   Respiratory:  Negative for cough and shortness of breath.    Cardiovascular:  Negative for chest pain and palpitations.   Gastrointestinal:  Negative for abdominal pain, diarrhea, nausea and vomiting.   Endocrine: Negative for polydipsia, polyphagia and polyuria.   Genitourinary:  Negative for difficulty urinating, dysuria, flank pain and hematuria.   Musculoskeletal:  Negative for arthralgias and back pain.   Skin:  Negative for color change and rash.   Allergic/Immunologic: Negative for immunocompromised state.   Neurological:  Positive for weakness, numbness and paresthesias. Negative for dizziness, tremors, seizures, syncope, facial asymmetry, speech difficulty, light-headedness and headaches.   Psychiatric/Behavioral:  Negative for confusion and self-injury. The patient is not nervous/anxious.    All other systems reviewed and are negative.          Objective       ED Triage Vitals [02/24/25 1344]   Temperature Pulse Blood Pressure Respirations SpO2 Patient Position - Orthostatic VS   98.6 °F (37 °C) 66 155/85 16 96 % Lying      Temp Source Heart Rate Source BP Location FiO2 (%) Pain Score    Temporal Monitor Left arm --  No Pain      Vitals      Date and Time Temp Pulse SpO2 Resp BP Pain Score FACES Pain Rating User   02/24/25 1718 -- 57 95 % 18 154/85 -- -- DII   02/24/25 1620 -- -- -- -- -- 6 -- AFG   02/24/25 1600 -- 62 97 % 16 128/75 -- -- AFG   02/24/25 1554 -- -- -- -- -- 6 -- AFG   02/24/25 1530 -- 60 97 % 15 134/77 -- -- AFG   02/24/25 1500 -- 60 94 % 18 119/66 -- -- AFG   02/24/25 1445 -- 60 95 % 18 128/71 -- -- AFG   02/24/25 1430 -- 61 96 % 18 133/66 -- -- AFG   02/24/25 1415 -- 63 95 % 18 161/80 -- -- AFG   02/24/25 1400 98 °F (36.7 °C) 68 99 % 18 137/66 No Pain -- KK   02/24/25 1344 98.6 °F (37 °C) 66 96 % 16 155/85 No Pain -- RR            Physical Exam  Vitals and nursing note reviewed.   Constitutional:       General: She is not in acute distress.     Appearance: Normal appearance. She is not ill-appearing, toxic-appearing or diaphoretic.   HENT:      Head: Normocephalic and atraumatic.      Right Ear: External ear normal.      Left Ear: External ear normal.      Nose: Nose normal. No congestion or rhinorrhea.      Mouth/Throat:      Mouth: Mucous membranes are moist.      Pharynx: Oropharynx is clear. No oropharyngeal exudate or posterior oropharyngeal erythema.   Eyes:      General:         Right eye: No discharge.         Left eye: No discharge.      Extraocular Movements: Extraocular movements intact.      Conjunctiva/sclera: Conjunctivae normal.      Pupils: Pupils are equal, round, and reactive to light.   Cardiovascular:      Rate and Rhythm: Regular rhythm.      Pulses: Normal pulses.      Heart sounds: Normal heart sounds. No murmur heard.     No gallop.   Pulmonary:      Effort: Pulmonary effort is normal. No respiratory distress.      Breath sounds: Normal breath sounds. No stridor. No wheezing, rhonchi or rales.   Chest:      Chest wall: No tenderness.   Abdominal:      General: Bowel sounds are normal. There is no distension.      Palpations: Abdomen is soft. There is no mass.      Tenderness: There is  no abdominal tenderness. There is no right CVA tenderness, left CVA tenderness, guarding or rebound.      Hernia: No hernia is present.   Musculoskeletal:         General: Normal range of motion.      Cervical back: Normal range of motion and neck supple.   Skin:     General: Skin is warm and dry.      Capillary Refill: Capillary refill takes less than 2 seconds.   Neurological:      General: No focal deficit present.      Mental Status: She is alert and oriented to person, place, and time.      Cranial Nerves: No cranial nerve deficit.      Sensory: No sensory deficit.      Motor: No weakness.      Coordination: Coordination normal.      Gait: Gait normal.      Deep Tendon Reflexes: Reflexes normal.      Comments: Subtle drift right leg, NIH SS 1   Psychiatric:         Mood and Affect: Mood normal.         Behavior: Behavior normal.         Thought Content: Thought content normal.         Judgment: Judgment normal.         Results Reviewed       Procedure Component Value Units Date/Time    HS Troponin I 4hr [[id number]]     Lab Status: No result Specimen: Blood     HS Troponin 0hr (reflex protocol) [[id number]]  (Normal) Collected: 02/24/25 1402    Lab Status: Final result Specimen: Blood from Arm, Left Updated: 02/24/25 1432     hs TnI 0hr 3 ng/L     Basic metabolic panel [[id number]] Collected: 02/24/25 1402    Lab Status: Final result Specimen: Blood from Arm, Left Updated: 02/24/25 1424     Sodium 138 mmol/L      Potassium 3.9 mmol/L      Chloride 104 mmol/L      CO2 28 mmol/L      ANION GAP 6 mmol/L      BUN 18 mg/dL      Creatinine 0.68 mg/dL      Glucose 121 mg/dL      Calcium 9.3 mg/dL      eGFR 90 ml/min/1.73sq m     Narrative:      National Kid[employer] guidelines for Chronic Kidney Disease (CKD):     Stage 1 with normal or high GFR (GFR > 90 mL/min/1.73 square meters)    Stage 2 Mild CKD (GFR = 60-89 mL/min/1.73 square meters)    Stage 3A Moderate CKD (GFR = 45-59 mL/min/1.73 square meters)     Stage 3B Moderate CKD (GFR = 30-44 mL/min/1.73 square meters)    Stage 4 Severe CKD (GFR = 15-29 mL/min/1.73 square meters)    Stage 5 End Stage CKD (GFR <15 mL/min/1.73 square meters)  Note: GFR calculation is accurate only with a steady state creatinine    Protime-INR [[id number]]  (Normal) Collected: 02/24/25 1402    Lab Status: Final result Specimen: Blood from Arm, Left Updated: 02/24/25 1420     Protime 13.3 seconds      INR 0.97    Narrative:      INR Therapeutic Range    Indication                                             INR Range      Atrial Fibrillation                                               2.0-3.0  Hypercoagulable State                                    2.0.2.3  Left Ventricular Asist Device                            2.0-3.0  Mechanical Heart Valve                                  -    Aortic(with afib, MI, embolism, HF, LA enlargement,    and/or coagulopathy)                                     2.0-3.0 (2.5-3.5)     Mitral                                                             2.5-3.5  Prosthetic/Bioprosthetic Heart Valve               2.0-3.0  Venous thromboembolism (VTE: VT, PE        2.0-3.0    APTT [[id number]]  (Normal) Collected: 02/24/25 1402    Lab Status: Final result Specimen: Blood from Arm, Left Updated: 02/24/25 1420     PTT 23 seconds     CBC and Platelet [[id number]]  (Abnormal) Collected: 02/24/25 1402    Lab Status: Final result Specimen: Blood from Arm, Left Updated: 02/24/25 1408     WBC 12.72 Thousand/uL      RBC 4.45 Million/uL      Hemoglobin 13.9 g/dL      Hematocrit 40.7 %      MCV 92 fL      MCH 31.2 pg      MCHC 34.2 g/dL      RDW 12.8 %      Platelets 325 Thousands/uL      MPV 9.6 fL     Fingerstick Glucose (POCT) [[id number]]  (Normal) Collected: 02/24/25 1404    Lab Status: Final result Specimen: Blood Updated: 02/24/25 1405     POC Glucose 125 mg/dl             CTA stroke alert (head/neck)   Final Interpretation by Wolfgang Sykes MD (02/24 1450)   1.  No  evidence of large vessel intracranial occlusion or hemodynamically significant stenosis involving the head or neck arterial vasculature.   2.  Status post aneurysm clipping within the right supraclinoid ICA region without evidence of residual aneurysm.   3.  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 Per report, this finding was present dating back to a 10/29/2021 outside MRA of the head which is unavailable for viewing at time of dictation.   4.  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 Per report, this finding was present dating back to a 10/29/2021 outside MRA of the neck which is unavailable for viewing at time of dictation.         Findings were discussed with Doctors Marlon Hutchinson and Laura Fallon on 2/24/2025 2:32 PM.      Workstation performed: [id number]         CT stroke alert brain   Final Interpretation by Wolfgang Sykes MD (02/24 1423)      1.  No acute territorial infarction, intracranial hemorrhage, mass, or mass effect.   2.  Status post aneurysm clipping within the right supraclinoid ICA/ICA terminus region.   3.  Probable small focus of encephalomalacia of the anteromedial right temporal lobe.      Findings were discussed with Doctors Marlon Hutchinson and Laura Fallon on 2/24/2025 2:20 PM.            Workstation performed: [id number]         MRI inpatient order    (Results Pending)       Procedures    ED Medication and Procedure Management   Prior to Admission Medications   Prescriptions Last Dose Informant Patient Reported? Taking?   ALPRAZolam (XANAX) 1 mg tablet   Yes No   Sig: Pt states, takes for anxiety prn   B Complex Vitamins (VITAMIN B COMPLEX) TABS  Self Yes No   Sig: Take 2 tablets by mouth daily    Cholecalciferol 25 MCG (1000 UT) capsule  Self Yes No   Sig: Take 1,000 Units by mouth daily    Coenzyme Q10 (CO Q 10 PO)   Yes No   Sig: Take by mouth   FLUoxetine (PROzac) 40 MG capsule  Self Yes No   Sig: Take by mouth daily    Multiple  Vitamins-Minerals (CENTRUM SILVER ULTRA WOMENS) TABS  Self Yes No   Sig: Take 1 tablet by mouth daily    Omega-3 Fatty Acids (OMEGA-3 FISH OIL) 1000 MG CAPS  Self Yes No   Sig: Take 1 capsule by mouth daily   Progesterone 200 MG CAPS   Yes No   Sig: Take 1 capsule by mouth every evening   celecoxib (CeleBREX) 200 mg capsule   Yes No   diclofenac (VOLTAREN) 75 mg EC tablet   Yes No   Sig: Take 75 mg by mouth 2 (two) times a day   gabapentin (NEURONTIN) 300 mg capsule   No No   Sig: TAKE 1 CAPSULE BY MOUTH THREE TIMES A DAY   lansoprazole (PREVACID) 30 mg capsule  Self Yes No   Sig: Take by mouth daily    levothyroxine 137 mcg tablet   Yes No   Sig: Take 137 mcg by mouth daily   losartan (COZAAR) 50 mg tablet   Yes No   Sig: Take 50 mg by mouth every morning   magnesium gluconate (MAGONATE) 500 mg tablet   Yes No   Sig: Take by mouth   ramelteon (ROZEREM) 8 mg tablet  Self Yes No   Si mg daily at bedtime    rosuvastatin (CRESTOR) 20 MG tablet   Yes No   Sig: Take 20 mg by mouth daily   simvastatin (ZOCOR) 40 mg tablet  Self Yes No   Sig: Take 40 mg by mouth daily at bedtime    tiZANidine (ZANAFLEX) 4 mg tablet   Yes No   Sig: Take 1 tablet by mouth every 6 (six) hours as needed   traZODone (DESYREL) 50 mg tablet  Self Yes No   Sig: Take 50 mg by mouth daily at bedtime    valACYclovir (VALTREX) 1,000 mg tablet   Yes No   Sig: TAKE 2 TABLETS BY MOUTH EVERY 12 HOURS AS DIRECTED   vitamin A 10,000 units capsule  Self Yes No   Sig: Take 10,000 Units by mouth daily    vitamin E, tocopherol, 400 units capsule  Self Yes No   Sig: Take 400 Units by mouth daily       Facility-Administered Medications: None     Current Discharge Medication List        CONTINUE these medications which have NOT CHANGED    Details   ALPRAZolam (XANAX) 1 mg tablet Pt states, takes for anxiety prn      B Complex Vitamins (VITAMIN B COMPLEX) TABS Take 2 tablets by mouth daily       celecoxib (CeleBREX) 200 mg capsule       Cholecalciferol 25 MCG  (1000 UT) capsule Take 1,000 Units by mouth daily       Coenzyme Q10 (CO Q 10 PO) Take by mouth      diclofenac (VOLTAREN) 75 mg EC tablet Take 75 mg by mouth 2 (two) times a day      FLUoxetine (PROzac) 40 MG capsule Take by mouth daily       gabapentin (NEURONTIN) 300 mg capsule TAKE 1 CAPSULE BY MOUTH THREE TIMES A DAY  Qty: 90 capsule, Refills: 2    Associated Diagnoses: Spinal stenosis of lumbar region with neurogenic claudication; Lumbar radiculopathy      lansoprazole (PREVACID) 30 mg capsule Take by mouth daily       levothyroxine 137 mcg tablet Take 137 mcg by mouth daily      losartan (COZAAR) 50 mg tablet Take 50 mg by mouth every morning      magnesium gluconate (MAGONATE) 500 mg tablet Take by mouth      Multiple Vitamins-Minerals (CENTRUM SILVER ULTRA WOMENS) TABS Take 1 tablet by mouth daily       Omega-3 Fatty Acids (OMEGA-3 FISH OIL) 1000 MG CAPS Take 1 capsule by mouth daily      Progesterone 200 MG CAPS Take 1 capsule by mouth every evening      ramelteon (ROZEREM) 8 mg tablet 8 mg daily at bedtime       rosuvastatin (CRESTOR) 20 MG tablet Take 20 mg by mouth daily      simvastatin (ZOCOR) 40 mg tablet Take 40 mg by mouth daily at bedtime       tiZANidine (ZANAFLEX) 4 mg tablet Take 1 tablet by mouth every 6 (six) hours as needed      traZODone (DESYREL) 50 mg tablet Take 50 mg by mouth daily at bedtime       valACYclovir (VALTREX) 1,000 mg tablet TAKE 2 TABLETS BY MOUTH EVERY 12 HOURS AS DIRECTED      vitamin A 10,000 units capsule Take 10,000 Units by mouth daily       vitamin E, tocopherol, 400 units capsule Take 400 Units by mouth daily            No discharge procedures on file.  ED SEPSIS DOCUMENTATION   Time reflects when diagnosis was documented in both MDM as applicable and the Disposition within this note       Time User Action Codes Description Comment    2/24/2025  1:53 PM Marlon Hutchinson Add [R53.1] Acute right-sided weakness                  Marlon Hutchinson MD  02/24/25 7785

## 2025-02-25 ENCOUNTER — APPOINTMENT (INPATIENT)
Dept: MRI IMAGING | Facility: HOSPITAL | Age: 70
DRG: 069 | End: 2025-02-25
Payer: MEDICARE

## 2025-02-25 ENCOUNTER — APPOINTMENT (INPATIENT)
Dept: NON INVASIVE DIAGNOSTICS | Facility: HOSPITAL | Age: 70
DRG: 069 | End: 2025-02-25
Payer: MEDICARE

## 2025-02-25 VITALS
DIASTOLIC BLOOD PRESSURE: 63 MMHG | HEART RATE: 69 BPM | RESPIRATION RATE: 17 BRPM | WEIGHT: 197 LBS | SYSTOLIC BLOOD PRESSURE: 111 MMHG | HEIGHT: 66 IN | TEMPERATURE: 97.3 F | OXYGEN SATURATION: 93 % | BODY MASS INDEX: 31.66 KG/M2

## 2025-02-25 LAB
AORTIC ROOT: 2.9 CM
BSA FOR ECHO PROCEDURE: 2.01 M2
CHOLEST SERPL-MCNC: 154 MG/DL (ref ?–200)
E WAVE DECELERATION TIME: 163 MS
E/A RATIO: 0.86
EST. AVERAGE GLUCOSE BLD GHB EST-MCNC: 117 MG/DL
FRACTIONAL SHORTENING: 32 (ref 28–44)
HBA1C MFR BLD: 5.7 %
HDLC SERPL-MCNC: 40 MG/DL
INTERVENTRICULAR SEPTUM IN DIASTOLE (PARASTERNAL SHORT AXIS VIEW): 1.1 CM
INTERVENTRICULAR SEPTUM: 1.1 CM (ref 0.6–1.1)
LAAS-AP2: 20.1 CM2
LAAS-AP4: 16.3 CM2
LDLC SERPL CALC-MCNC: 61 MG/DL (ref 0–100)
LEFT ATRIUM SIZE: 4 CM
LEFT ATRIUM VOLUME (MOD BIPLANE): 53 ML
LEFT ATRIUM VOLUME INDEX (MOD BIPLANE): 26.6 ML/M2
LEFT INTERNAL DIMENSION IN SYSTOLE: 3.4 CM (ref 2.1–4)
LEFT VENTRICULAR INTERNAL DIMENSION IN DIASTOLE: 5 CM (ref 3.5–6)
LEFT VENTRICULAR POSTERIOR WALL IN END DIASTOLE: 1.2 CM
LEFT VENTRICULAR STROKE VOLUME: 71 ML
LV EF US.2D.A4C+ESTIMATED: 74 %
LVSV (TEICH): 71 ML
MV E'TISSUE VEL-SEP: 9 CM/S
MV PEAK A VEL: 0.78 M/S
MV PEAK E VEL: 67 CM/S
MV STENOSIS PRESSURE HALF TIME: 47 MS
MV VALVE AREA P 1/2 METHOD: 4.68
RIGHT ATRIUM AREA SYSTOLE A4C: 13.9 CM2
RIGHT VENTRICLE ID DIMENSION: 3.1 CM
SL CV LEFT ATRIUM LENGTH A2C: 5.1 CM
SL CV LV EF: 65
SL CV PED ECHO LEFT VENTRICLE DIASTOLIC VOLUME (MOD BIPLANE) 2D: 119 ML
SL CV PED ECHO LEFT VENTRICLE SYSTOLIC VOLUME (MOD BIPLANE) 2D: 48 ML
TR MAX PG: 22 MMHG
TR PEAK VELOCITY: 2.4 M/S
TRICUSPID VALVE PEAK REGURGITATION VELOCITY: 2.36 M/S
TRIGL SERPL-MCNC: 263 MG/DL (ref ?–150)

## 2025-02-25 PROCEDURE — 93306 TTE W/DOPPLER COMPLETE: CPT

## 2025-02-25 PROCEDURE — 93306 TTE W/DOPPLER COMPLETE: CPT | Performed by: INTERNAL MEDICINE

## 2025-02-25 PROCEDURE — 80061 LIPID PANEL: CPT | Performed by: FAMILY MEDICINE

## 2025-02-25 PROCEDURE — NC001 PR NO CHARGE: Performed by: PSYCHIATRY & NEUROLOGY

## 2025-02-25 PROCEDURE — 83036 HEMOGLOBIN GLYCOSYLATED A1C: CPT | Performed by: FAMILY MEDICINE

## 2025-02-25 PROCEDURE — 99239 HOSP IP/OBS DSCHRG MGMT >30: CPT | Performed by: FAMILY MEDICINE

## 2025-02-25 PROCEDURE — 70551 MRI BRAIN STEM W/O DYE: CPT

## 2025-02-25 RX ORDER — ASPIRIN 81 MG/1
81 TABLET ORAL DAILY
Qty: 30 TABLET | Refills: 0 | Status: SHIPPED | OUTPATIENT
Start: 2025-02-26 | End: 2025-03-28

## 2025-02-25 RX ORDER — CLOPIDOGREL BISULFATE 75 MG/1
75 TABLET ORAL DAILY
Qty: 21 TABLET | Refills: 0 | Status: SHIPPED | OUTPATIENT
Start: 2025-02-26 | End: 2025-03-19

## 2025-02-25 RX ADMIN — ASPIRIN 81 MG: 81 TABLET, COATED ORAL at 08:14

## 2025-02-25 RX ADMIN — LEVOTHYROXINE SODIUM 137 MCG: 25 TABLET ORAL at 05:28

## 2025-02-25 RX ADMIN — FLUOXETINE HYDROCHLORIDE 40 MG: 20 CAPSULE ORAL at 08:15

## 2025-02-25 RX ADMIN — OXYCODONE HYDROCHLORIDE AND ACETAMINOPHEN 1000 MG: 500 TABLET ORAL at 08:14

## 2025-02-25 RX ADMIN — GABAPENTIN 300 MG: 300 CAPSULE ORAL at 08:15

## 2025-02-25 RX ADMIN — CLOPIDOGREL 75 MG: 75 TABLET ORAL at 08:15

## 2025-02-25 RX ADMIN — AMOXICILLIN AND CLAVULANATE POTASSIUM 1 TABLET: 875; 125 TABLET, FILM COATED ORAL at 08:15

## 2025-02-25 RX ADMIN — PANTOPRAZOLE SODIUM 40 MG: 40 TABLET, DELAYED RELEASE ORAL at 08:15

## 2025-02-25 RX ADMIN — ALPRAZOLAM 1 MG: 0.5 TABLET ORAL at 08:15

## 2025-02-25 RX ADMIN — HEPARIN SODIUM 5000 UNITS: 5000 INJECTION INTRAVENOUS; SUBCUTANEOUS at 05:29

## 2025-02-25 RX ADMIN — LOSARTAN POTASSIUM 50 MG: 50 TABLET, FILM COATED ORAL at 08:14

## 2025-02-25 NOTE — SPEECH THERAPY NOTE
Speech Language/Pathology  Consult received.  Records reviewed.  Pt admitted c symptoms concerning for CVA/TIA.  Pt passed RN Dysphagia Assessment.  Communication deficits denied.      MRI results :   1.  Artifactual distortion from aneurysm clip. Given confines, no acute infarction.  2.  Mild chronic microangiopathic changes..      No additional inpatient Speech Pathology evaluation appears indicated at this time.  Please re-consult if additional concerns arise. Thank you.    Heather Aguilar, MS CCC-SLP  2/25/2025

## 2025-02-25 NOTE — DISCHARGE SUMMARY
Discharge Summary - Hospitalist   Name: Cecilia Tamayo 69 y.o. female I MRN: [id number]  Unit/Bed#: -01 I Date of Admission: 2/24/2025   Date of Service: 2/25/2025 I Hospital Day: 1     Assessment & Plan  Acute right-sided weakness  Suspect TIA-symptoms resolved  Symptoms started on 2/23/2025 at 5:30 PM-with heaviness of right arm, then feeling abnormal sensation on rest of the face which lasted about 3 minutes, later on the right side of the body was feeling heavy  Stroke alert initiated  NIH score is 1  Imaging so far negative  Patient had history of clipping of aneurysm for which she is being followed up with neurologist at [facility]-postprocedure, per patient, had multiple MRI, and being monitored with regular MRA  loading dose of aspirin, after that continue aspirin and Plavix for 3 weeks, after that monotherapy with aspirin, statin  MRI of the brain negative for stroke-neurology is recommending outpatient MRI of the cervical neck  PT OT appreciate recommendation  Per patient, she is very active, go to the gym every other day, at least an hour without any difficulties  Has family history of a stroke from mother  History of cerebral aneurysm repair  Patient followed up with neurology at [facility]     Discharging Physician / Practitioner: Mir Weber MD  PCP: Mauricio Hawley MD  Admission Date:   Admission Orders (From admission, onward)       Ordered        02/24/25 1552  INPATIENT ADMISSION  Once                          Discharge Date: 02/25/25    Medical Problems       Resolved Problems  Date Reviewed: 8/30/2023   None         Consultations During Hospital Stay:  Neurology, PT/OT    Procedures Performed:   MRI brain wo contrast   Final Result by Jameel Haynes MD (02/25 3715)      1.  Artifactual distortion from aneurysm clip. Given confines, no acute infarction.   2.  Mild chronic microangiopathic changes.      Workstation performed: [id number]         CTA stroke alert (head/neck)   Final  Result by Wolfgang Sykes MD (02/24 2075)   1.  No evidence of large vessel intracranial occlusion or hemodynamically significant stenosis involving the head or neck arterial vasculature.   2.  Status post aneurysm clipping within the right supraclinoid ICA region without evidence of residual aneurysm.   3.  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 Per report, this finding was present dating back to a 10/29/2021 outside MRA of the head which is unavailable for viewing at time of dictation.   4.  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 Per report, this finding was present dating back to a 10/29/2021 outside MRA of the neck which is unavailable for viewing at time of dictation.         Findings were discussed with Doctors Marlon Hutchinson and Laura Fallon on 2/24/2025 2:32 PM.      Workstation performed: [id number]         CT stroke alert brain   Final Result by Wolfgang Sykes MD (02/24 1099)      1.  No acute territorial infarction, intracranial hemorrhage, mass, or mass effect.   2.  Status post aneurysm clipping within the right supraclinoid ICA/ICA terminus region.   3.  Probable small focus of encephalomalacia of the anteromedial right temporal lobe.      Findings were discussed with Doctors Marlon Hutchinson and Laura Fallon on 2/24/2025 2:20 PM.            Workstation performed: [id number]           ECHO Complete With Contrast If Indicated :  Left Ventricle: Left ventricular cavity size is normal. There is mild concentric hypertrophy. The left ventricular ejection fraction is 65%. Systolic function is normal. Diastolic function is mildly abnormal, consistent with grade I (abnormal) relaxation.    Left Atrium: The atrium is mildly dilated.    Atrial Septum: Interatrial septum is intact without any shunting on color Doppler study as well as with agitated saline contrast study under resting conditions and with provocative maneuvers.    Mitral Valve: There is mild  "regurgitation.    Significant Findings / Test Results:   Lab Results   Component Value Date    WBC 12.72 (H) 02/24/2025    HGB 13.9 02/24/2025    HCT 40.7 02/24/2025    MCV 92 02/24/2025     02/24/2025     Lab Results   Component Value Date    SODIUM 138 02/24/2025    K 3.9 02/24/2025     02/24/2025    CO2 28 02/24/2025    AGAP 6 02/24/2025    BUN 18 02/24/2025    CREATININE 0.68 02/24/2025    GLUC 121 02/24/2025    CALCIUM 9.3 02/24/2025    AST 25 10/09/2024    ALT 31 10/09/2024    ALKPHOS 77 10/09/2024    TP 6.7 10/09/2024    TBILI 0.4 10/09/2024    EGFR 90 02/24/2025     Lab Results   Component Value Date    CHOLESTEROL 154 02/25/2025     Lab Results   Component Value Date    HDL 40 (L) 02/25/2025     Lab Results   Component Value Date    TRIG 263 (H) 02/25/2025     No results found for: \"NONHDLC\"  Lab Results   Component Value Date    HGBA1C 5.7 (H) 02/25/2025         Incidental Findings:   As mentioned above    Test Results Pending at Discharge (will require follow up):   None     Outpatient Tests Requested:  May need MRI of the cervical neck    Complications: None    Reason for Admission: Right-sided weakness, heaviness of right arm    Hospital Course:     Cecilia Tamayo is a 69 y.o. female patient who originally presented to the hospital on 2/24/2025 due to right-sided weakness, heaviness of right arm, right face and right side of the weakness.  Stroke alert initiated, CTA head neck nonsignificant, MRI of the brain nonischemic, telemetry remained uneventful, EKG normal, echocardiogram normal, discussed with neurology, suspect TIA.  Patient is status post loading dose of aspirin, per neurology recommending aspirin and Plavix for 3 weeks after that monotherapy with aspirin.  Also continue statin.  Neurology also recommending outpatient MRI of the cervical neck.  Lab results commending findings, treatment plan option discussed in details with patient.  Verbalized understanding agrees.  On " "discharge date, patient's symptoms resolved.    The patient, initially admitted to the hospital as inpatient, was discharged earlier than expected given the following: Patient condition improved, symptoms are resolved, neurology cleared the patient..    Please see above list of diagnoses and related plan for additional information.     Condition at Discharge: stable     Discharge Day Visit / Exam:     Subjective: Seen and evaluated during the run.  Sitting comfortably denies any significant complaint.  Vitals: Blood Pressure: 128/84 (02/25/25 0820)  Pulse: 66 (02/25/25 0820)  Temperature: 97.7 °F (36.5 °C) (02/25/25 0820)  Temp Source: Temporal (02/25/25 0820)  Respirations: 18 (02/25/25 0820)  Height: 5' 6\" (167.6 cm) (02/24/25 1820)  Weight - Scale: 89.4 kg (197 lb) (02/25/25 0546)  SpO2: 95 % (02/25/25 0820)  Exam:   Physical Exam  Vitals and nursing note reviewed.   Constitutional:       Appearance: Normal appearance. She is not ill-appearing or diaphoretic.   Eyes:      General: No scleral icterus.        Left eye: No discharge.      Extraocular Movements: Extraocular movements intact.      Pupils: Pupils are equal, round, and reactive to light.   Cardiovascular:      Rate and Rhythm: Normal rate.      Heart sounds: No murmur heard.     No friction rub. No gallop.   Pulmonary:      Effort: Pulmonary effort is normal. No respiratory distress.      Breath sounds: No stridor. No wheezing or rhonchi.   Abdominal:      General: There is no distension.      Palpations: There is no mass.      Tenderness: There is no abdominal tenderness.      Hernia: No hernia is present.   Musculoskeletal:      Right lower leg: No edema.      Left lower leg: No edema.   Neurological:      Mental Status: She is alert and oriented to person, place, and time.      Cranial Nerves: No cranial nerve deficit.      Sensory: No sensory deficit.      Motor: No weakness.      Coordination: Coordination normal.         Discussion with Family: " With patient    Discharge instructions/Information to patient and family:   See after visit summary for information provided to patient and family.      Provisions for Follow-Up Care:  See after visit summary for information related to follow-up care and any pertinent home health orders.      Disposition:     Home    For Discharges to [facility]:   Not Applicable to this Patient - Not Applicable to this Patient    Planned Readmission: If condition get worse     Discharge Statement:  Greater than 50% of the total time was spent examining patient, answering all patient questions, arranging and discussing plan of care with patient as well as directly providing post-discharge instructions.  Additional time then spent on discharge activities.    Discharge Medications:  See after visit summary for reconciled discharge medications provided to patient and family.      ** Please Note: This note has been constructed using a voice recognition system **

## 2025-02-25 NOTE — PLAN OF CARE
Problem: PAIN - ADULT  Goal: Verbalizes/displays adequate comfort level or baseline comfort level  Description: Interventions:  - Encourage patient to monitor pain and request assistance  - Assess pain using appropriate pain scale  - Administer analgesics based on type and severity of pain and evaluate response  - Implement non-pharmacological measures as appropriate and evaluate response  - Consider cultural and social influences on pain and pain management  - Notify physician/advanced practitioner if interventions unsuccessful or patient reports new pain  Outcome: Progressing     Problem: INFECTION - ADULT  Goal: Absence or prevention of progression during hospitalization  Description: INTERVENTIONS:  - Assess and monitor for signs and symptoms of infection  - Monitor lab/diagnostic results  - Monitor all insertion sites, i.e. indwelling lines, tubes, and drains  - Monitor endotracheal if appropriate and nasal secretions for changes in amount and color  - [place] appropriate cooling/warming therapies per order  - Administer medications as ordered  - Instruct and encourage patient and family to use good hand hygiene technique  - Identify and instruct in appropriate isolation precautions for identified infection/condition  Outcome: Progressing  Goal: Absence of fever/infection during neutropenic period  Description: INTERVENTIONS:  - Monitor WBC    Outcome: Progressing     Problem: SAFETY ADULT  Goal: Patient will remain free of falls  Description: INTERVENTIONS:  - Educate patient/family on patient safety including physical limitations  - Instruct patient to call for assistance with activity   - Consult OT/PT to assist with strengthening/mobility   - Keep Call bell within reach  - Keep bed low and locked with side rails adjusted as appropriate  - Keep care items and personal belongings within reach  - Initiate and maintain comfort rounds  - Make Fall Risk Sign visible to staff  - Offer Toileting every 2 Hours,  in advance of need  - Initiate/Maintain bed/chair alarm  - Obtain necessary fall risk management equipment: na  - Apply yellow socks and bracelet for high fall risk patients  - Consider moving patient to room near nurses station  Outcome: Progressing  Goal: Maintain or return to baseline ADL function  Description: INTERVENTIONS:  -  Assess patient's ability to carry out ADLs; assess patient's baseline for ADL function and identify physical deficits which impact ability to perform ADLs (bathing, care of mouth/teeth, toileting, grooming, dressing, etc.)  - Assess/evaluate cause of self-care deficits   - Assess range of motion  - Assess patient's mobility; develop plan if impaired  - Assess patient's need for assistive devices and provide as appropriate  - Encourage maximum independence but intervene and supervise when necessary  - Involve family in performance of ADLs  - Assess for home care needs following discharge   - Consider OT consult to assist with ADL evaluation and planning for discharge  - Provide patient education as appropriate  Outcome: Progressing  Goal: Maintains/Returns to pre admission functional level  Description: INTERVENTIONS:  - Perform AM-PAC 6 Click Basic Mobility/ Daily Activity assessment daily.  - Set and communicate daily mobility goal to care team and patient/family/caregiver.   - Collaborate with rehabilitation services on mobility goals if consulted  - Perform Range of Motion 2 times a day.  - Reposition patient every 2 hours.  - Dangle patient 2 times a day  - Stand patient 2 times a day  - Ambulate patient 2 times a day  - Out of bed to chair 2 times a day   - Out of bed for meals 2 times a day  - Out of bed for toileting  - Record patient progress and toleration of activity level   Outcome: Progressing     Problem: DISCHARGE PLANNING  Goal: Discharge to home or other facility with appropriate resources  Description: INTERVENTIONS:  - Identify barriers to discharge w/patient and  caregiver  - Arrange for needed discharge resources and transportation as appropriate  - Identify discharge learning needs (meds, wound care, etc.)  - Arrange for interpretive services to assist at discharge as needed  - Refer to Case Management Department for coordinating discharge planning if the patient needs post-hospital services based on physician/advanced practitioner order or complex needs related to functional status, cognitive ability, or social support system  Outcome: Progressing     Problem: Knowledge Deficit  Goal: Patient/family/caregiver demonstrates understanding of disease process, treatment plan, medications, and discharge instructions  Description: Complete learning assessment and assess knowledge base.  Interventions:  - Provide teaching at level of understanding  - Provide teaching via preferred learning methods  Outcome: Progressing

## 2025-02-25 NOTE — CASE MANAGEMENT
Case Management Assessment & Discharge Planning Note    Patient name Cecilia Tamayo  Location /-01 MRN [id number]  : 1955 Date 2025       Current Admission Date: 2025  Current Admission Diagnosis:Acute right-sided weakness   Patient Active Problem List    Diagnosis Date Noted Date Diagnosed    Acute right-sided weakness 2025     Spinal stenosis of lumbar region with neurogenic claudication 2023     Lumbar radiculopathy 2023     Lumbar spondylosis 2023     Chest pain 2020     Hyperlipidemia 2020     History of cerebral aneurysm repair 2020     Tobacco abuse 2020       LOS (days): 1  Geometric Mean LOS (GMLOS) (days): 2.6  Days to GMLOS:1.8     OBJECTIVE:    Risk of Unplanned Readmission Score: 7.83         Current admission status: Inpatient  Referral Reason: Stroke    Preferred Pharmacy:   [id number] - [place], [place] - [address] [facility]  [address] [facility] [address]  Phone: [phone] Fax: [phone]    Primary Care Provider: Mauricio Hawley MD    Primary Insurance: MEDICARE  Secondary Insurance: [employer]    ASSESSMENT:  Active Health Care Proxies       Reneesarika Shaan Health Care Representative - Spouse   Primary Phone: [phone] (Work)  Mobile Phone: [phone]  Home Phone: [phone]                 Advance Directives  Does patient have a Health Care POA?: Yes  Does patient have Advance Directives?: Yes  Advance Directives: Living will  Primary Contact: Shaan (Spouse)         Readmission Root Cause  30 Day Readmission: No    Patient Information  Admitted from:: Home  Mental Status: Alert  During Assessment patient was accompanied by: Not accompanied during assessment  Assessment information provided by:: Patient  Primary Caregiver: Self  Support Systems: Spouse/significant other, Children  County of Residence: [facility]  What [facility] do you live in?: [facility] entry  access options. Select all that apply.: Stairs (front 2, back 10-12)  Number of steps to enter home.: 10  Do the steps have railings?: Yes  Type of Current Residence: 2 story home  Upon entering residence, is there a bedroom on the main floor (no further steps)?: No  A bedroom is located on the following floor levels of residence (select all that apply):: 2nd Floor  Upon entering residence, is there a bathroom on the main floor (no further steps)?: Yes (1/2 bath, full bath 2nd floor)  Number of steps to 2nd floor from main floor:  (18)  Living Arrangements: Lives w/ Spouse/significant other  Is patient a ?: No    Activities of Daily Living Prior to Admission  Functional Status: Independent  Completes ADLs independently?: Yes  Ambulates independently?: Yes  Does patient use assisted devices?: No  Does patient currently own DME?: Yes  What DME does the patient currently own?: Straight Cane, Walker, Shower Chair  Does patient have a history of Outpatient Therapy (PT/OT)?: Yes ([place])  Does the patient have a history of Short-Term Rehab?: No  Does patient have a history of HHC?: No  Does patient currently have HHC?: No         Patient Information Continued  Income Source: SSI/SSD  Does patient have prescription coverage?: Yes  Does patient receive dialysis treatments?: No  Does patient have a history of substance abuse?: Yes  Historical substance use preference: Alcohol/ETOH  History of Withdrawal Symptoms: Other withdrawal symptoms (specify in comment) (shakes, headaches)  Is patient currently in treatment for substance abuse?: N/A - sober (23 years)  Does patient have a history of Mental Health Diagnosis?: No         Means of Transportation  [facility]          DISCHARGE DETAILS:    Discharge planning discussed with:: patient  Freedom of Choice: Yes  Comments - Freedom of Choice: no needs anticipated  CM contacted family/caregiver?: No- see comments (declined)  Were Treatment  Team discharge recommendations reviewed with patient/caregiver?: Yes  Did patient/caregiver verbalize understanding of patient care needs?: Yes  Were patient/caregiver advised of the risks associated with not following Treatment Team discharge recommendations?: Yes         Requested Home Health Care         Is the patient interested in HHC at discharge?: No    DME Referral Provided  Referral made for DME?: No    Other Referral/Resources/Interventions Provided:  Interventions: None Indicated    Would you like to participate in our [facility] service program?  : No - Declined    Treatment Team Recommendation: Home  Discharge Destination Plan:: Home  Transport at Discharge : Family          CM met with patient at the bedside,baseline information  was obtained. CM discussed the role of CM in helping the patient develop a discharge plan and assist the patient in carry out their plan.  Patient was IPTA, uses no DME at baseline for ambulation, and has no hx of rehab/therapy. Patient did have OOPT in the past and owns cane, walker and shower chair from past procedure. Patient reports to be physically active working out several times a week. She is retired and able to drive.   CM discussed discharge planning - at this time patient has no anticipated CM needs.       Case was discussed in multi disciplinary rounds. Attending the rounds were the provider, Nursing, Care Management, and therapy ( Pt).   Plan is MRI -, waiting on formal neuro recs - anticipate discharge later today  DC recommendation is home, patient cleared independent by therapy team.   CM will continue to follow.

## 2025-02-25 NOTE — ASSESSMENT & PLAN NOTE
Suspect TIA-symptoms resolved  Symptoms started on 2/23/2025 at 5:30 PM-with heaviness of right arm, then feeling abnormal sensation on rest of the face which lasted about 3 minutes, later on the right side of the body was feeling heavy  Stroke alert initiated  NIH score is 1  Imaging so far negative  Patient had history of clipping of aneurysm for which she is being followed up with neurologist at [facility]-postprocedure, per patient, had multiple MRI, and being monitored with regular MRA  loading dose of aspirin, after that continue aspirin and Plavix for 3 weeks, after that monotherapy with aspirin, statin  MRI of the brain negative for stroke-neurology is recommending outpatient MRI of the cervical neck  PT OT appreciate recommendation  Per patient, she is very active, go to the gym every other day, at least an hour without any difficulties  Has family history of a stroke from mother

## 2025-02-25 NOTE — CONSULTS
Duplicate request. See original note. MRI brain is normal. Pending MRI C-spine. If c-spine does not show a major pathology then impression would be TIA. C/w DAPT for 3 weeks and high dose statin

## 2025-02-25 NOTE — PHYSICAL THERAPY NOTE
Physical Therapy Screen      Patient Name: Cecilia Tamayo    Today's Date: 2/25/2025     Problem List  Principal Problem:    Acute right-sided weakness  Active Problems:    History of cerebral aneurysm repair       Past Medical History  Past Medical History:   Diagnosis Date    Anxiety     Cerebral aneurysm     Hyperlipidemia         Past Surgical History  Past Surgical History:   Procedure Laterality Date    BREAST CYST ASPIRATION Right 09/02/2022    cyst that abscessed    CARDIAC CATHETERIZATION      2005    CAUDAL BLOCK N/A 9/21/2023    Procedure: BLOCK / INJECTION CAUDAL;  Surgeon: Hollis Avila MD;  Location: [facility];  Service: Pain Management     CEREBRAL ANEURYSM REPAIR  07/1997    at [facility]      EPIDURAL BLOCK INJECTION N/A 8/3/2023    Procedure: L5-S1 ILESI;  Surgeon: Hollis Avila MD;  Location: [facility];  Service: Pain Management     NERVE BLOCK Bilateral 5/11/2023    Procedure: BLOCK MEDIAL BRANCH L3, L4, L5 #1;  Surgeon: Hollis Avila MD;  Location: [facility];  Service: Pain Management     NERVE BLOCK Bilateral 5/30/2023    Procedure: BLOCK MEDIAL BRANCH L3, L4, L5 #2;  Surgeon: Hollis Avila MD;  Location: [facility];  Service: Pain Management     RHIZOTOMY Bilateral 6/15/2023    Procedure: RHIZOTOMY LUMBAR Medial branch nerves L3, L4, L5;  Surgeon: Hollis Avila MD;  Location: [facility];  Service: Pain Management            02/25/25 1052   Note Type   Note type Screen   Additional Comments Independent       Received order for PT consult. Chart reviewed. Pt admitted with diagnosis of acute R sided weakness. Spoke with OT.  Patient reports she is at her PLOF of independent at this time. She reports ambulating independently without difficulty. Pt reports no concerns with returning home upon discharge. Will D/C PT services at this time as patient is at her PLOF of independent without acute care PT services warranted. Should patient's status change, please  re-consult.        Benjamin Gonzalez, PT,DPT

## 2025-02-25 NOTE — OCCUPATIONAL THERAPY NOTE
Occupational Therapy Screen     Patient Name: Cecilia Tamayo  Today's Date: 2/25/2025  Problem List  Principal Problem:    Acute right-sided weakness  Active Problems:    History of cerebral aneurysm repair    Past Medical History  Past Medical History:   Diagnosis Date    Anxiety     Cerebral aneurysm     Hyperlipidemia      Past Surgical History  Past Surgical History:   Procedure Laterality Date    BREAST CYST ASPIRATION Right 09/02/2022    cyst that abscessed    CARDIAC CATHETERIZATION      2005    CAUDAL BLOCK N/A 9/21/2023    Procedure: BLOCK / INJECTION CAUDAL;  Surgeon: Hollis Avila MD;  Location: [facility];  Service: Pain Management     CEREBRAL ANEURYSM REPAIR  07/1997    at [facility]      EPIDURAL BLOCK INJECTION N/A 8/3/2023    Procedure: L5-S1 ILESI;  Surgeon: Hollis Avila MD;  Location: [facility];  Service: Pain Management     NERVE BLOCK Bilateral 5/11/2023    Procedure: BLOCK MEDIAL BRANCH L3, L4, L5 #1;  Surgeon: Hollis Avila MD;  Location: [facility];  Service: Pain Management     NERVE BLOCK Bilateral 5/30/2023    Procedure: BLOCK MEDIAL BRANCH L3, L4, L5 #2;  Surgeon: Hollis Avila MD;  Location: [facility];  Service: Pain Management     RHIZOTOMY Bilateral 6/15/2023    Procedure: RHIZOTOMY LUMBAR Medial branch nerves L3, L4, L5;  Surgeon: Hollis Avila MD;  Location: [facility];  Service: Pain Management            02/25/25 1051   OT Last Visit   OT Visit Date 02/25/25   Note Type   Note type Screen   Additional Comments Independent       OT orders received. Chart review completed. Patient admitted to [facility] on 2/24 with Dx: acute right-sided weakness. Spoke with pt's PCA who reported patient was I in room. Spoke with pt who reported no concerns regarding ADLs, IADLs or functional mobility upon return to home. Pt reports her right-sided numbness in her arm has subsided, and was able to walk a short community distance independently with no device and complete a  handwriting task reporting her performance is at baseline. Pt appears to be at prior level of functioning at this time and does not require acute OT services. D/C OT effective 2/25/2025. If new concerns arise, please re-consult.    Harshad Galindo, OTR/L

## 2025-02-25 NOTE — PLAN OF CARE
Problem: PAIN - ADULT  Goal: Verbalizes/displays adequate comfort level or baseline comfort level  Description: Interventions:  - Encourage patient to monitor pain and request assistance  - Assess pain using appropriate pain scale  - Administer analgesics based on type and severity of pain and evaluate response  - Implement non-pharmacological measures as appropriate and evaluate response  - Consider cultural and social influences on pain and pain management  - Notify physician/advanced practitioner if interventions unsuccessful or patient reports new pain  Outcome: Progressing     Problem: INFECTION - ADULT  Goal: Absence or prevention of progression during hospitalization  Description: INTERVENTIONS:  - Assess and monitor for signs and symptoms of infection  - Monitor lab/diagnostic results  - Monitor all insertion sites, i.e. indwelling lines, tubes, and drains  - Monitor endotracheal if appropriate and nasal secretions for changes in amount and color  - Manning appropriate cooling/warming therapies per order  - Administer medications as ordered  - Instruct and encourage patient and family to use good hand hygiene technique  - Identify and instruct in appropriate isolation precautions for identified infection/condition  Outcome: Progressing  Goal: Absence of fever/infection during neutropenic period  Description: INTERVENTIONS:  - Monitor WBC    Outcome: Progressing     Problem: SAFETY ADULT  Goal: Patient will remain free of falls  Description: INTERVENTIONS:  - Educate patient/family on patient safety including physical limitations  - Instruct patient to call for assistance with activity   - Consult OT/PT to assist with strengthening/mobility   - Keep Call bell within reach  - Keep bed low and locked with side rails adjusted as appropriate  - Keep care items and personal belongings within reach  - Initiate and maintain comfort rounds  - Make Fall Risk Sign visible to staff  - Offer Toileting every 2 Hours,  in advance of need  - Initiate/Maintain bed/chair alarm  - Obtain necessary fall risk management equipment: na  - Apply yellow socks and bracelet for high fall risk patients  - Consider moving patient to room near nurses station  Outcome: Progressing  Goal: Maintain or return to baseline ADL function  Description: INTERVENTIONS:  -  Assess patient's ability to carry out ADLs; assess patient's baseline for ADL function and identify physical deficits which impact ability to perform ADLs (bathing, care of mouth/teeth, toileting, grooming, dressing, etc.)  - Assess/evaluate cause of self-care deficits   - Assess range of motion  - Assess patient's mobility; develop plan if impaired  - Assess patient's need for assistive devices and provide as appropriate  - Encourage maximum independence but intervene and supervise when necessary  - Involve family in performance of ADLs  - Assess for home care needs following discharge   - Consider OT consult to assist with ADL evaluation and planning for discharge  - Provide patient education as appropriate  Outcome: Progressing  Goal: Maintains/Returns to pre admission functional level  Description: INTERVENTIONS:  - Perform AM-PAC 6 Click Basic Mobility/ Daily Activity assessment daily.  - Set and communicate daily mobility goal to care team and patient/family/caregiver.   - Collaborate with rehabilitation services on mobility goals if consulted  - Perform Range of Motion 2 times a day.  - Reposition patient every 2 hours.  - Dangle patient 2 times a day  - Stand patient 2 times a day  - Ambulate patient 2 times a day  - Out of bed to chair 2 times a day   - Out of bed for meals 2 times a day  - Out of bed for toileting  - Record patient progress and toleration of activity level   Outcome: Progressing     Problem: DISCHARGE PLANNING  Goal: Discharge to home or other facility with appropriate resources  Description: INTERVENTIONS:  - Identify barriers to discharge w/patient and  caregiver  - Arrange for needed discharge resources and transportation as appropriate  - Identify discharge learning needs (meds, wound care, etc.)  - Arrange for interpretive services to assist at discharge as needed  - Refer to Case Management Department for coordinating discharge planning if the patient needs post-hospital services based on physician/advanced practitioner order or complex needs related to functional status, cognitive ability, or social support system  Outcome: Progressing     Problem: Knowledge Deficit  Goal: Patient/family/caregiver demonstrates understanding of disease process, treatment plan, medications, and discharge instructions  Description: Complete learning assessment and assess knowledge base.  Interventions:  - Provide teaching at level of understanding  - Provide teaching via preferred learning methods  Outcome: Progressing

## 2025-03-10 ENCOUNTER — TELEPHONE (OUTPATIENT)
Age: 70
End: 2025-03-10

## 2025-03-10 NOTE — TELEPHONE ENCOUNTER
Caller: Cecilia     Doctor: Dr. Avila     Reason for call: Patient calling stating 15+/10 pain level and would like to schedule a procedure please advise     Call back#: [phone]

## 2025-03-10 NOTE — TELEPHONE ENCOUNTER
Pt requesting repeat injection due to recent increased 10/10 constant pain to B/L lower back.  Pt was taking diclofenac twice per day and had recent TIA, which then had to stop diclofenac due to started on plavix.    Pt will be taking plavix for one more week , then only taking 81mg daily aspirin.    Pt reports last injection received 75-80% relief  Pain is the same as prior injection.    Last injection 8/3/2023 L5-S1 LESI  LOV 8/30/2023    Nurse did schedule an OV for 3/31/2025 at 10:00.  Explained to pt last office visit was 8/30/2023.  Pt agreeable and understanding if VS would want to see pt before scheduling injection.    Please advise

## 2025-03-28 RX ORDER — FLAXSEED OIL 1000 MG
CAPSULE ORAL
COMMUNITY

## 2025-03-31 ENCOUNTER — PREP FOR PROCEDURE (OUTPATIENT)
Dept: PAIN MEDICINE | Facility: CLINIC | Age: 70
End: 2025-03-31

## 2025-03-31 ENCOUNTER — OFFICE VISIT (OUTPATIENT)
Dept: PAIN MEDICINE | Facility: CLINIC | Age: 70
End: 2025-03-31
Payer: MEDICARE

## 2025-03-31 VITALS — HEIGHT: 66 IN | BODY MASS INDEX: 32.3 KG/M2 | WEIGHT: 201 LBS

## 2025-03-31 DIAGNOSIS — M54.16 LUMBAR RADICULOPATHY: Primary | ICD-10-CM

## 2025-03-31 DIAGNOSIS — M47.26 OTHER SPONDYLOSIS WITH RADICULOPATHY, LUMBAR REGION: Primary | ICD-10-CM

## 2025-03-31 PROCEDURE — 99214 OFFICE O/P EST MOD 30 MIN: CPT | Performed by: ANESTHESIOLOGY

## 2025-03-31 PROCEDURE — G2211 COMPLEX E/M VISIT ADD ON: HCPCS | Performed by: ANESTHESIOLOGY

## 2025-03-31 NOTE — PATIENT INSTRUCTIONS
"Patient Education     Back exercises   The Basics   Written by the doctors and editors at [facility]   Why do I need to do back exercises? -- Back exercises can help ease back pain and might help prevent future back pain. Long term, it is important to strengthen the muscles in your lower back, buttocks, and belly. These are your \"core muscles.\" Stretching exercises are also important to keep your muscles flexible.  Below are some stretching and strengthening exercises that might help you. Other forms of movement can help ease or prevent back pain, too. For example, some people like to walk, do aerobic exercise, or do yoga or anh chi. The most important thing is to move your body. Your doctor, nurse, or physical therapist can help you find different types of activity that work for you.  What stretching exercises should I do? -- Below are some examples of stretching exercises. Warm up your muscles before stretching to help prevent injury. To warm up, you can walk, jog, or cycle for a few minutes.  Start by repeating each of these stretches 2 to 3 times. Try to hold each stretch for 5 to 10 seconds, and try to do the stretches 2 to 3 times each day. Breathe slowly and deeply as you do the exercises. Never bounce when doing stretches.   Cat-cow stretch (figure 1) - Start on all fours on the floor, with your hands under your shoulders, knees under your hips, and your back flat. First, tuck your chin and tighten your stomach muscles as you round your back (like a \"cat\"). Hold the stretch for about 10 seconds. Rest for a few seconds as you flatten your back. Next, lift your chin and let your belly and lower back sink toward the floor (like a \"cow\"). Hold the stretch for about 10 seconds.   Single knee-to-chest stretches (figure 2) ? While lying on your back, bend your knees with your feet flat on the floor. Pull 1 knee toward your chest until you feel a stretch in your lower back and buttock area. Lower, and repeat with the " other knee. If you have knee problems, pull your knee up by grabbing the back of your thigh instead of the front of your knee. You can also do this exercise by grabbing both knees at the same time.   Lower trunk rotations (figure 3) ? While lying on your back, bend your knees with your feet flat on the floor. Keep your knees and ankles together, and then drop them to 1 side. Keep both of your shoulders touching the floor until you feel a stretch in the muscles at the side of the back. Repeat on the other side.   Lower back stretches seated (figure 4) ? Sit in a chair with your feet spread about shoulder width apart. Then, lean forward until you feel a stretch in your lower back.  What strengthening exercises should I do? -- Below are some examples of strengthening exercises.  Start by doing each exercise 2 to 3 times. Work up to doing each exercise 10 times. Hold each exercise for 3 to 5 seconds, and try to do the exercises 2 to 3 times each day. Do all exercises slowly.   Shoulder blade squeezes (figure 5) ? Pinch your shoulder blades together on your upper back, and hold 3 to 5 seconds. You can also do these 1 side at a time. Sit with good posture, and make sure that your shoulders do not rise up when you do this exercise. Relax.   Pelvic tilts (figure 6) ? Lie on your back with your knees bent and feet flat on the floor. Tighten your stomach muscles, and press your lower back down to the floor. Relax. You should be able to breathe comfortably during this exercise.   Hip lifts (figure 7) ? Lie on your back with your knees bent and feet flat on the floor. Tighten your stomach muscles, keep your back flat, and lift your buttocks off of the floor. Relax. You should feel this in your buttocks, not in your lower back.  What else should I know?    Exercise, including stretching, might be slightly uncomfortable. But you should not have sharp or severe pain. If you do get severe pain, stop what you are doing. If severe  "pain continues, call your doctor or nurse.   Do not hold your breath when exercising. If you tend to hold your breath, try counting out loud when exercising. If any exercise bothers you, stop right away.   Always warm up before exercising. Warm muscles stretch much easier than cool muscles. Stretching cool muscles can lead to injury.   Doing exercises before a meal can be a good way to get into a routine.  All topics are updated as new evidence becomes available and our peer review process is complete.  This topic retrieved from [employer] on: Apr 03, 2024.  Topic 054051 Version 2.0  Release: 32.2.4 - C32.92  © 2024 Up[employer]Date, Inc. and/or its affiliates. All rights reserved.  figure 1: Cat-cow stretch     Start on all fours on the floor, with hands under your shoulders, knees under your hips, and your back flat. First, tuck your chin and tighten your stomach muscles as you round your back (like a \"cat\"). Hold the stretch for about 10 seconds. Rest for a few seconds as you flatten your back. Next, lift your chin and let your belly and lower back sink toward the floor (like a \"cow\"). Hold the stretch for about 10 seconds.  Graphic 549818 Version 1.0  figure 2: Single knee-to-chest stretch     Lie on your back, bend your knees, and have your feet flat on the floor. Pull 1 knee toward your chest until you feel a stretch in your lower back and buttock area. Repeat with the other knee. If you have knee problems, pull your knee up by grabbing the back of your thigh instead of the front of your knee. You can also do this exercise by grabbing both knees at the same time.  Graphic [id number] Version 1.0  figure 3: Lower trunk rotation     While lying on your back, bend your knees and have your feet flat on the floor. Keep your legs together and then drop them to 1 side. Keep both of your shoulders touching the floor until you feel a stretch in the muscles at the side of the back. Repeat on the other side.  Graphic 108034 Version " 1.0  figure 4: Lower back stretch     Sit in a chair with your feet spread about shoulder width apart. Then, lean forward until you feel a stretch in your lower back.  Graphic [id number] Version 1.0  figure 5: Shoulder blade squeezes     Pinch your shoulder blades together on your upper back and hold for 3 to 5 seconds. Make sure that you are sitting with good posture and that your shoulders do not raise up when you do this exercise. Relax.  Graphic [id number] Version 1.0  figure 6: Pelvic tilts     Lie on your back with your knees bent and feet flat on the floor. Tighten your stomach muscles and press your lower back down to the floor. Relax.  Graphic [id number] Version 1.0  figure 7: Hip lifts     Lie on your back with your knees bent and feet flat on the floor. Tighten your stomach muscles and lift your buttocks off of the floor. Relax.  Graphic 625096 Version 1.0  Consumer Information Use and Disclaimer   Disclaimer: This generalized information is a limited summary of diagnosis, treatment, and/or medication information. It is not meant to be comprehensive and should be used as a tool to help the user understand and/or assess potential diagnostic and treatment options. It does NOT include all information about conditions, treatments, medications, side effects, or risks that may apply to a specific patient. It is not intended to be medical advice or a substitute for the medical advice, diagnosis, or treatment of a health care provider based on the health care provider's examination and assessment of a patient's specific and unique circumstances. Patients must speak with a health care provider for complete information about their health, medical questions, and treatment options, including any risks or benefits regarding use of medications. This information does not endorse any treatments or medications as safe, effective, or approved for treating a specific patient. Up[employer], Inc[employer] and its affiliates disclaim any warranty or  liability relating to this information or the use thereof.The use of this information is governed by the Terms of Use, available at https://www.wolterskluwer.com/en/know/clinical-effectiveness-terms. 2024© UpToDate, Inc. and its affiliates and/or licensors. All rights reserved.  Copyright   © 2024 [employer] and/or its affiliates. All rights reserved.

## 2025-03-31 NOTE — H&P (VIEW-ONLY)
Assessment  1. Other spondylosis with radiculopathy, lumbar region    Greater than 90% relief of pain with improved ability to participate with IADLs after Bilateral L3, L4, L5 medial branch blocks #1 and #2 and with subsequent radiofrequency lesioning of medial branch nerves performed 6/15/23; describes now proximal radicular features in left L4 and L5 dermatome and shopping cart sign. On MRI Multilevel degenerative change without high-grade canal or foraminal stenosis as detailed, worse at level L5-S1 with bilateral lateral recess stenosis impacting S1 nerve roots. Correlates for S1 radiculopathy. Moderate bilateral lateral recess narrowing at L4-5 also contributory. Greater than 80% relief with prior caudal VENICE and taking gabapentin without side effects. Counseled with regard to continued home PT. Previously reported the following symptomatology:    Axial low back pain described primarily by arthritic features.  Aching, nagging, indolent, stabbing, throbbing features in axial low back without radicular components.  5/5 strength bilaterally, negative SLR.  Positive facet loading maneuvers in lumbar spine elicited pain, positive tenderness to palpation over lumbar paraspinal muscles.  Findings correlate with prominent lumbar facet arthropathy seen throughout axial low back on x-ray.  Currently she is neurologically intact without gait instability, saddle anesthesia or bowel/bladder abnormality. Risks, benefits alternative to medial branch blocks and subsequent radiofrequency ablation if successful thoroughly discussed with patient.  Handouts provided questions answered to patient satisfaction.    Plan  -Caudal VENICE; f/u 2 weeks post procedure  -counseled regarding spinal stenosis with neurogenic claudication; counseled regarding red flag sxs  -gabapentin 300 mg t.i.d. Ordered for patient; taking BID counseled regarding sedative effects of taking this medication and provided up titration calendar.  Counseled not  to take medication while driving or operating heavy machinery/using stairs  -Celebrex 200 mg b.i.d. prn pain previously prescribed.  Patient educated regarding bleeding risk of taking this medication as well as not taking any other nonsteroidal anti-inflammatory medications while taking this medication; counseled thoroughly regarding potential risk of Cardiovascular injury, Kidney injury, Gastrointestinal ulceration/bleeding. Patient voiced understanding  -script provided for formal physical therapy for lumbar facet arthropathy, radiculopathy; Physician directed home exercise plan as per [facility] demonstrated and handouts provided that patient plans to participate with for 1 hour, twice a week for the next 6 weeks.     There are risks associated with opioid medications, including dependence, addiction and tolerance. The patient understands and agrees to use these medications only as prescribed. Potential side effects of the medications include, but are not limited to, constipation, drowsiness, addiction, impaired judgment and risk of fatal overdose if not taken as prescribed. The patient was warned against driving while taking sedation medications or operating heavy machinery. The patient voiced understanding. Sharing medications is a felony. At this point in time, the patient is showing no signs of addiction, abuse, diversion or suicidal ideation.     [place] Prescription Drug Monitoring Program report was reviewed and was appropriate      Complete risks and benefits including bleeding, infection, tissue reaction, nerve injury and allergic reaction were discussed. The approach was demonstrated using models and literature was provided. Verbal and written consent was obtained.     My impressions and treatment recommendations were discussed in detail with the patient who verbalized understanding and had no further questions.  Discharge instructions were provided. I personally saw and examined the patient and I agree  with the above discussed plan of care.    New Medications Ordered This Visit   Medications    Collagen-Vitamin C (Super Collagen Plus Vitamin C) 1000-10 MG TABS     Sig: Take by mouth       History of Present Illness    Greater than 90% relief of pain with improved ability to participate with IADLs after Bilateral L3, L4, L5 medial branch blocks #1 and #2 and with subsequent radiofrequency lesioning of medial branch nerves performed 6/15/23; describes now proximal radicular features in left L4 and L5 dermatome and shopping cart sign. No significant relief with recent ILESI at L5-S1. Previously reported the following symptomatology:    Cecilia Tamayo is a 69 y.o. female with pmhx o fprior cerebral aneurysm, XOL, obesity, presenting with chronic low back pain described primarily as arthritic in nature.  She describes 8/10 low back pain that is worse in the mornings and worse at the end of the day.  The pain is characterized by achy, nagging, indolent, crampy, stabbing pain in her axial low back.  The patient describes that the pain is worse with standing for long periods of time on hard surfaces as well as with walking.  The patient is a very active individual and feels as though this pain compromises his participation with independent activities of daily living. The pain can be debilitating at times and contribute to significant disability, compromising overall activity and independent activities of daily living.  She has not yet tried PT for her pain.  Medications the patient has tried in the past include nsaids, tylenol. She describes no radicular symptoms and has good strength.  She denies any weakness numbness or paresthesias. The patient denies any bowel or bladder dysfunction, saddle anesthesia or gait instability as well.      I have personally reviewed and/or updated the patient's past medical history, past surgical history, family history, social history, current medications, allergies, and vital signs  today.     Review of Systems   Constitutional:  Positive for activity change.   HENT: Negative.     Eyes: Negative.    Respiratory: Negative.     Cardiovascular: Negative.    Gastrointestinal: Negative.    Endocrine: Negative.    Genitourinary: Negative.    Musculoskeletal:  Positive for arthralgias, back pain and myalgias. Negative for gait problem.   Skin: Negative.    Allergic/Immunologic: Negative.    Neurological:  Negative for weakness and numbness.   Hematological: Negative.    Psychiatric/Behavioral: Negative.     All other systems reviewed and are negative.      Patient Active Problem List   Diagnosis    Chest pain    Hyperlipidemia    History of cerebral aneurysm repair    Tobacco abuse    Lumbar spondylosis    Spinal stenosis of lumbar region with neurogenic claudication    Lumbar radiculopathy    Acute right-sided weakness       Past Medical History:   Diagnosis Date    Anxiety     Cerebral aneurysm     Hyperlipidemia        Past Surgical History:   Procedure Laterality Date    BREAST CYST ASPIRATION Right 09/02/2022    cyst that abscessed    CARDIAC CATHETERIZATION      2005    CAUDAL BLOCK N/A 9/21/2023    Procedure: BLOCK / INJECTION CAUDAL;  Surgeon: Hollis Avila MD;  Location: [facility];  Service: Pain Management     CEREBRAL ANEURYSM REPAIR  07/1997    at [facility]      EPIDURAL BLOCK INJECTION N/A 8/3/2023    Procedure: L5-S1 ILESI;  Surgeon: Hollis Avila MD;  Location: [facility];  Service: Pain Management     NERVE BLOCK Bilateral 5/11/2023    Procedure: BLOCK MEDIAL BRANCH L3, L4, L5 #1;  Surgeon: Hollis Avila MD;  Location: [facility];  Service: Pain Management     NERVE BLOCK Bilateral 5/30/2023    Procedure: BLOCK MEDIAL BRANCH L3, L4, L5 #2;  Surgeon: Hollis Avila MD;  Location: [facility];  Service: Pain Management     RHIZOTOMY Bilateral 6/15/2023    Procedure: RHIZOTOMY LUMBAR Medial branch nerves L3, L4, L5;  Surgeon: Hollis Avila MD;  Location: [facility];   Service: Pain Management        Family History   Problem Relation Age of Onset    No Known Problems Mother     Prostate cancer Father     No Known Problems Daughter     No Known Problems Daughter     No Known Problems Maternal Grandmother     No Known Problems Maternal Grandfather     No Known Problems Paternal Grandmother     No Known Problems Paternal Grandfather     No Known Problems Paternal Aunt        Social History     Occupational History    Not on file   Tobacco Use    Smoking status: Former     Current packs/day: 0.00     Average packs/day: 0.3 packs/day for 25.0 years (6.3 ttl pk-yrs)     Types: Cigarettes     Quit date: 2015     Years since quitting: 10.2    Smokeless tobacco: Never   Vaping Use    Vaping status: Never Used   Substance and Sexual Activity    Alcohol use: Never    Drug use: Yes     Types: Marijuana     Comment: during college    Sexual activity: Not on file       Current Outpatient Medications on File Prior to Visit   Medication Sig    ALPRAZolam (XANAX) 1 mg tablet Take 1 mg by mouth 3 (three) times a day as needed for anxiety Pt states, takes for anxiety prn    Ascorbic Acid (vitamin C) 1000 MG tablet Take 1,000 mg by mouth daily    aspirin (ECOTRIN LOW STRENGTH) 81 mg EC tablet Take 1 tablet (81 mg total) by mouth daily    B Complex Vitamins (VITAMIN B COMPLEX) TABS Take 2 tablets by mouth daily     Cholecalciferol 125 MCG (5000 UT) capsule Take 5,000 Units by mouth daily    Collagen-Vitamin C (Super Collagen Plus Vitamin C) 1000-10 MG TABS Take by mouth    diclofenac (VOLTAREN) 75 mg EC tablet Take 75 mg by mouth 2 (two) times a day (Patient taking differently: Take 75 mg by mouth 2 (two) times a day As needed)    FLUoxetine (PROzac) 40 MG capsule Take by mouth daily     gabapentin (NEURONTIN) 300 mg capsule TAKE 1 CAPSULE BY MOUTH THREE TIMES A DAY (Patient taking differently: Take 300 mg by mouth 2 (two) times a day)    levothyroxine 137 mcg tablet Take 137 mcg by mouth daily     "losartan (COZAAR) 50 mg tablet Take 50 mg by mouth every morning    magnesium gluconate (MAGONATE) 500 mg tablet Take 500 mg by mouth daily    Multiple Vitamins-Minerals (CENTRUM SILVER ULTRA WOMENS) TABS Take 1 tablet by mouth daily     pantoprazole (PROTONIX) 40 mg tablet Take 40 mg by mouth 2 (two) times a day    Progesterone 200 MG CAPS Take 1 capsule by mouth every evening    ramelteon (ROZEREM) 8 mg tablet 8 mg daily at bedtime  (Patient taking differently: 8 mg daily at bedtime As needed)    rosuvastatin (CRESTOR) 20 MG tablet Take 20 mg by mouth daily    solifenacin (VESICARE) 10 MG tablet Take 10 mg by mouth every morning    tiZANidine (ZANAFLEX) 4 mg tablet Take 1 tablet by mouth every 6 (six) hours as needed    traZODone (DESYREL) 50 mg tablet Take 50 mg by mouth daily at bedtime     valACYclovir (VALTREX) 1,000 mg tablet TAKE 2 TABLETS BY MOUTH EVERY 12 HOURS AS DIRECTED (Patient taking differently: As needed)    vitamin A 10,000 units capsule Take 10,000 Units by mouth daily     vitamin E, tocopherol, 400 units capsule Take 400 Units by mouth daily     clopidogrel (PLAVIX) 75 mg tablet Take 1 tablet (75 mg total) by mouth daily for 21 days     No current facility-administered medications on file prior to visit.       Allergies   Allergen Reactions    Zolpidem Other (See Comments)     Felt like speeding.    Other Reaction(s): Other (See Comments)      Felt like speeding.       Felt like speeding.    Guaifenesin Other (See Comments)     \"speeding\", palpitations    Heart races    Other Reaction(s): Other (see comments)      Heart races    Phenylephrine-Acetaminophen Other (See Comments)     Heart races    Other Reaction(s): Other (see comments)      Heart races     Physical Exam    Ht 5' 6\" (1.676 m)   Wt 91.2 kg (201 lb)   LMP  (LMP Unknown)   BMI 32.44 kg/m²     Constitutional: normal, well developed, well nourished, alert, in no distress and non-toxic and no overt pain behavior. and obese  Eyes: " anicteric  HEENT: grossly intact  Neck: supple, symmetric, trachea midline and no masses   Pulmonary:even and unlabored  Cardiovascular:No edema or pitting edema present  Skin:Normal without rashes or lesions and well hydrated  Psychiatric:Mood and affect appropriate  Neurologic:Cranial Nerves II-XII grossly intact Sensation grossly intact; no clonus negative morales's. Reflexes 2+ and brisk. SLR negative bilaterally.   Musculoskeletal:normal gait. 5/5 strength bilaterally with AROM in lower extremities. Normal heel toe and tip toe walking. Significant pain with lumbar facet loading bilaterally and with lateral spine rotation. ttp over lumbar paraspinal muscles. Negative alyson's test, negative gaenslen's negative SIJ loading bilaterally.    Imaging    MRI LUMBAR SPINE WITHOUT CONTRAST     INDICATION: M47.816: Spondylosis without myelopathy or radiculopathy, lumbar region  M54.16: Radiculopathy, lumbar region.     COMPARISON:  None.     TECHNIQUE:  Multiplanar, multisequence imaging of the lumbar spine was performed. .        IMAGE QUALITY:  Diagnostic     FINDINGS:     VERTEBRAL BODIES:  There are 5 lumbar type vertebral bodies. There is preserved normal lumbar lordosis. Minimal retrolisthesis at L5-S1.     There is Modic type I endplate degenerative signal change at L1-2. Modic type II endplate degenerative signal change at levels L4-5 and L5-S1. Scattered endplate Schmorl's nodes.     SACRUM:  Normal signal within the sacrum. No evidence of insufficiency or stress fracture.     DISTAL CORD AND CONUS:  Normal size and signal within the distal cord and conus.     PARASPINAL SOFT TISSUES:  Paraspinal soft tissues are unremarkable.     LOWER THORACIC DISC SPACES: Mild noncompressive lower thoracic degenerative change.     LUMBAR DISC SPACES:     L1-L2: Disc bulge. There is left foraminal disc protrusion. Moderate bilateral facet arthropathy and ligamentum flavum thickening. Mild canal stenosis. Mild left foraminal  "narrowing.     L2-L3: Disc bulge. Moderate facet arthropathy. Mild canal stenosis. Mild bilateral foraminal narrowing.     L3-L4: Minimal disc bulge. Moderate facet arthropathy. Minor canal narrowing. No significant foraminal stenosis.     L4-L5: Disc bulge. Moderate bilateral facet arthropathy. Bilateral ligamentum flavum thickening. Moderate bilateral lateral recess narrowing and mild canal stenosis. Mild bilateral foraminal narrowing.     L5-S1: Disc bulge. There is central disc protrusion. Severe bilateral facet arthropathy. There is bilateral lateral recess stenosis impacting S1 nerve roots. Mild canal stenosis. Mild bilateral foraminal narrowing.     OTHER FINDINGS: Indeterminate subcentimeter left renal T2 hyperintense cortical lesion, statistically favored to represent benign cyst.     IMPRESSION:     1.  Multilevel degenerative change without high-grade canal or foraminal stenosis as detailed, worse at level L5-S1 with bilateral lateral recess stenosis impacting S1 nerve roots. Correlate for S1 radiculopathy.  2.  Moderate bilateral lateral recess narrowing at L4-5.    BiggerPipo MD - 02/06/2023   Formatting of this note might be different from the original.   EXAM   XR L SPINE AP AND LATERAL-   2/3/2023 3:18 pm     HISTORY   Provided clinical history: \"worsening low back pain Lumbar DDD\"     TECHNIQUE   Three views of the lumbar spine were obtained.     COMPARISON   Radiographs dated March 11, 2014.     FINDINGS   Five lumbar-type vertebral bodies.  Multilevel intervertebral disc degeneration, moderate-to-severe at L1-2, L2-3, L4-5, and L5-S1.  Multilevel facet osteoarthritis, greatest near the lumbosacral junction. No fracture or aggressive osseous lesion identified.  Mild-to-moderate osteoarthritis of both hips.  Surgical clips in the right upper abdomen.     IMPRESSION   IMPRESSION   Degenerative findings as above.        "

## 2025-03-31 NOTE — PROGRESS NOTES
Assessment  1. Other spondylosis with radiculopathy, lumbar region    Greater than 90% relief of pain with improved ability to participate with IADLs after Bilateral L3, L4, L5 medial branch blocks #1 and #2 and with subsequent radiofrequency lesioning of medial branch nerves performed 6/15/23; describes now proximal radicular features in left L4 and L5 dermatome and shopping cart sign. On MRI Multilevel degenerative change without high-grade canal or foraminal stenosis as detailed, worse at level L5-S1 with bilateral lateral recess stenosis impacting S1 nerve roots. Correlates for S1 radiculopathy. Moderate bilateral lateral recess narrowing at L4-5 also contributory. Greater than 80% relief with prior caudal VENICE and taking gabapentin without side effects. Counseled with regard to continued home PT. Previously reported the following symptomatology:    Axial low back pain described primarily by arthritic features.  Aching, nagging, indolent, stabbing, throbbing features in axial low back without radicular components.  5/5 strength bilaterally, negative SLR.  Positive facet loading maneuvers in lumbar spine elicited pain, positive tenderness to palpation over lumbar paraspinal muscles.  Findings correlate with prominent lumbar facet arthropathy seen throughout axial low back on x-ray.  Currently she is neurologically intact without gait instability, saddle anesthesia or bowel/bladder abnormality. Risks, benefits alternative to medial branch blocks and subsequent radiofrequency ablation if successful thoroughly discussed with patient.  Handouts provided questions answered to patient satisfaction.    Plan  -Caudal VENICE; f/u 2 weeks post procedure  -counseled regarding spinal stenosis with neurogenic claudication; counseled regarding red flag sxs  -gabapentin 300 mg t.i.d. Ordered for patient; taking BID counseled regarding sedative effects of taking this medication and provided up titration calendar.  Counseled not  to take medication while driving or operating heavy machinery/using stairs  -Celebrex 200 mg b.i.d. prn pain previously prescribed.  Patient educated regarding bleeding risk of taking this medication as well as not taking any other nonsteroidal anti-inflammatory medications while taking this medication; counseled thoroughly regarding potential risk of Cardiovascular injury, Kidney injury, Gastrointestinal ulceration/bleeding. Patient voiced understanding  -script provided for formal physical therapy for lumbar facet arthropathy, radiculopathy; Physician directed home exercise plan as per [facility] demonstrated and handouts provided that patient plans to participate with for 1 hour, twice a week for the next 6 weeks.     There are risks associated with opioid medications, including dependence, addiction and tolerance. The patient understands and agrees to use these medications only as prescribed. Potential side effects of the medications include, but are not limited to, constipation, drowsiness, addiction, impaired judgment and risk of fatal overdose if not taken as prescribed. The patient was warned against driving while taking sedation medications or operating heavy machinery. The patient voiced understanding. Sharing medications is a felony. At this point in time, the patient is showing no signs of addiction, abuse, diversion or suicidal ideation.     [place] Prescription Drug Monitoring Program report was reviewed and was appropriate      Complete risks and benefits including bleeding, infection, tissue reaction, nerve injury and allergic reaction were discussed. The approach was demonstrated using models and literature was provided. Verbal and written consent was obtained.     My impressions and treatment recommendations were discussed in detail with the patient who verbalized understanding and had no further questions.  Discharge instructions were provided. I personally saw and examined the patient and I agree  with the above discussed plan of care.    New Medications Ordered This Visit   Medications    Collagen-Vitamin C (Super Collagen Plus Vitamin C) 1000-10 MG TABS     Sig: Take by mouth       History of Present Illness    Greater than 90% relief of pain with improved ability to participate with IADLs after Bilateral L3, L4, L5 medial branch blocks #1 and #2 and with subsequent radiofrequency lesioning of medial branch nerves performed 6/15/23; describes now proximal radicular features in left L4 and L5 dermatome and shopping cart sign. No significant relief with recent ILESI at L5-S1. Previously reported the following symptomatology:    Cecilia Tamayo is a 69 y.o. female with pmhx o fprior cerebral aneurysm, XOL, obesity, presenting with chronic low back pain described primarily as arthritic in nature.  She describes 8/10 low back pain that is worse in the mornings and worse at the end of the day.  The pain is characterized by achy, nagging, indolent, crampy, stabbing pain in her axial low back.  The patient describes that the pain is worse with standing for long periods of time on hard surfaces as well as with walking.  The patient is a very active individual and feels as though this pain compromises his participation with independent activities of daily living. The pain can be debilitating at times and contribute to significant disability, compromising overall activity and independent activities of daily living.  She has not yet tried PT for her pain.  Medications the patient has tried in the past include nsaids, tylenol. She describes no radicular symptoms and has good strength.  She denies any weakness numbness or paresthesias. The patient denies any bowel or bladder dysfunction, saddle anesthesia or gait instability as well.      I have personally reviewed and/or updated the patient's past medical history, past surgical history, family history, social history, current medications, allergies, and vital signs  today.     Review of Systems   Constitutional:  Positive for activity change.   HENT: Negative.     Eyes: Negative.    Respiratory: Negative.     Cardiovascular: Negative.    Gastrointestinal: Negative.    Endocrine: Negative.    Genitourinary: Negative.    Musculoskeletal:  Positive for arthralgias, back pain and myalgias. Negative for gait problem.   Skin: Negative.    Allergic/Immunologic: Negative.    Neurological:  Negative for weakness and numbness.   Hematological: Negative.    Psychiatric/Behavioral: Negative.     All other systems reviewed and are negative.      Patient Active Problem List   Diagnosis    Chest pain    Hyperlipidemia    History of cerebral aneurysm repair    Tobacco abuse    Lumbar spondylosis    Spinal stenosis of lumbar region with neurogenic claudication    Lumbar radiculopathy    Acute right-sided weakness       Past Medical History:   Diagnosis Date    Anxiety     Cerebral aneurysm     Hyperlipidemia        Past Surgical History:   Procedure Laterality Date    BREAST CYST ASPIRATION Right 09/02/2022    cyst that abscessed    CARDIAC CATHETERIZATION      2005    CAUDAL BLOCK N/A 9/21/2023    Procedure: BLOCK / INJECTION CAUDAL;  Surgeon: Hollis Avila MD;  Location: [facility];  Service: Pain Management     CEREBRAL ANEURYSM REPAIR  07/1997    at [facility]      EPIDURAL BLOCK INJECTION N/A 8/3/2023    Procedure: L5-S1 ILESI;  Surgeon: Hollis Avila MD;  Location: [facility];  Service: Pain Management     NERVE BLOCK Bilateral 5/11/2023    Procedure: BLOCK MEDIAL BRANCH L3, L4, L5 #1;  Surgeon: Hollis Avila MD;  Location: [facility];  Service: Pain Management     NERVE BLOCK Bilateral 5/30/2023    Procedure: BLOCK MEDIAL BRANCH L3, L4, L5 #2;  Surgeon: Hollis Avila MD;  Location: [facility];  Service: Pain Management     RHIZOTOMY Bilateral 6/15/2023    Procedure: RHIZOTOMY LUMBAR Medial branch nerves L3, L4, L5;  Surgeon: Hollis Avila MD;  Location: [facility];   Service: Pain Management        Family History   Problem Relation Age of Onset    No Known Problems Mother     Prostate cancer Father     No Known Problems Daughter     No Known Problems Daughter     No Known Problems Maternal Grandmother     No Known Problems Maternal Grandfather     No Known Problems Paternal Grandmother     No Known Problems Paternal Grandfather     No Known Problems Paternal Aunt        Social History     Occupational History    Not on file   Tobacco Use    Smoking status: Former     Current packs/day: 0.00     Average packs/day: 0.3 packs/day for 25.0 years (6.3 ttl pk-yrs)     Types: Cigarettes     Quit date: 2015     Years since quitting: 10.2    Smokeless tobacco: Never   Vaping Use    Vaping status: Never Used   Substance and Sexual Activity    Alcohol use: Never    Drug use: Yes     Types: Marijuana     Comment: during college    Sexual activity: Not on file       Current Outpatient Medications on File Prior to Visit   Medication Sig    ALPRAZolam (XANAX) 1 mg tablet Take 1 mg by mouth 3 (three) times a day as needed for anxiety Pt states, takes for anxiety prn    Ascorbic Acid (vitamin C) 1000 MG tablet Take 1,000 mg by mouth daily    aspirin (ECOTRIN LOW STRENGTH) 81 mg EC tablet Take 1 tablet (81 mg total) by mouth daily    B Complex Vitamins (VITAMIN B COMPLEX) TABS Take 2 tablets by mouth daily     Cholecalciferol 125 MCG (5000 UT) capsule Take 5,000 Units by mouth daily    Collagen-Vitamin C (Super Collagen Plus Vitamin C) 1000-10 MG TABS Take by mouth    diclofenac (VOLTAREN) 75 mg EC tablet Take 75 mg by mouth 2 (two) times a day (Patient taking differently: Take 75 mg by mouth 2 (two) times a day As needed)    FLUoxetine (PROzac) 40 MG capsule Take by mouth daily     gabapentin (NEURONTIN) 300 mg capsule TAKE 1 CAPSULE BY MOUTH THREE TIMES A DAY (Patient taking differently: Take 300 mg by mouth 2 (two) times a day)    levothyroxine 137 mcg tablet Take 137 mcg by mouth daily     "losartan (COZAAR) 50 mg tablet Take 50 mg by mouth every morning    magnesium gluconate (MAGONATE) 500 mg tablet Take 500 mg by mouth daily    Multiple Vitamins-Minerals (CENTRUM SILVER ULTRA WOMENS) TABS Take 1 tablet by mouth daily     pantoprazole (PROTONIX) 40 mg tablet Take 40 mg by mouth 2 (two) times a day    Progesterone 200 MG CAPS Take 1 capsule by mouth every evening    ramelteon (ROZEREM) 8 mg tablet 8 mg daily at bedtime  (Patient taking differently: 8 mg daily at bedtime As needed)    rosuvastatin (CRESTOR) 20 MG tablet Take 20 mg by mouth daily    solifenacin (VESICARE) 10 MG tablet Take 10 mg by mouth every morning    tiZANidine (ZANAFLEX) 4 mg tablet Take 1 tablet by mouth every 6 (six) hours as needed    traZODone (DESYREL) 50 mg tablet Take 50 mg by mouth daily at bedtime     valACYclovir (VALTREX) 1,000 mg tablet TAKE 2 TABLETS BY MOUTH EVERY 12 HOURS AS DIRECTED (Patient taking differently: As needed)    vitamin A 10,000 units capsule Take 10,000 Units by mouth daily     vitamin E, tocopherol, 400 units capsule Take 400 Units by mouth daily     clopidogrel (PLAVIX) 75 mg tablet Take 1 tablet (75 mg total) by mouth daily for 21 days     No current facility-administered medications on file prior to visit.       Allergies   Allergen Reactions    Zolpidem Other (See Comments)     Felt like speeding.    Other Reaction(s): Other (See Comments)      Felt like speeding.       Felt like speeding.    Guaifenesin Other (See Comments)     \"speeding\", palpitations    Heart races    Other Reaction(s): Other (see comments)      Heart races    Phenylephrine-Acetaminophen Other (See Comments)     Heart races    Other Reaction(s): Other (see comments)      Heart races     Physical Exam    Ht 5' 6\" (1.676 m)   Wt 91.2 kg (201 lb)   LMP  (LMP Unknown)   BMI 32.44 kg/m²     Constitutional: normal, well developed, well nourished, alert, in no distress and non-toxic and no overt pain behavior. and obese  Eyes: " anicteric  HEENT: grossly intact  Neck: supple, symmetric, trachea midline and no masses   Pulmonary:even and unlabored  Cardiovascular:No edema or pitting edema present  Skin:Normal without rashes or lesions and well hydrated  Psychiatric:Mood and affect appropriate  Neurologic:Cranial Nerves II-XII grossly intact Sensation grossly intact; no clonus negative morales's. Reflexes 2+ and brisk. SLR negative bilaterally.   Musculoskeletal:normal gait. 5/5 strength bilaterally with AROM in lower extremities. Normal heel toe and tip toe walking. Significant pain with lumbar facet loading bilaterally and with lateral spine rotation. ttp over lumbar paraspinal muscles. Negative alyson's test, negative gaenslen's negative SIJ loading bilaterally.    Imaging    MRI LUMBAR SPINE WITHOUT CONTRAST     INDICATION: M47.816: Spondylosis without myelopathy or radiculopathy, lumbar region  M54.16: Radiculopathy, lumbar region.     COMPARISON:  None.     TECHNIQUE:  Multiplanar, multisequence imaging of the lumbar spine was performed. .        IMAGE QUALITY:  Diagnostic     FINDINGS:     VERTEBRAL BODIES:  There are 5 lumbar type vertebral bodies. There is preserved normal lumbar lordosis. Minimal retrolisthesis at L5-S1.     There is Modic type I endplate degenerative signal change at L1-2. Modic type II endplate degenerative signal change at levels L4-5 and L5-S1. Scattered endplate Schmorl's nodes.     SACRUM:  Normal signal within the sacrum. No evidence of insufficiency or stress fracture.     DISTAL CORD AND CONUS:  Normal size and signal within the distal cord and conus.     PARASPINAL SOFT TISSUES:  Paraspinal soft tissues are unremarkable.     LOWER THORACIC DISC SPACES: Mild noncompressive lower thoracic degenerative change.     LUMBAR DISC SPACES:     L1-L2: Disc bulge. There is left foraminal disc protrusion. Moderate bilateral facet arthropathy and ligamentum flavum thickening. Mild canal stenosis. Mild left foraminal  "narrowing.     L2-L3: Disc bulge. Moderate facet arthropathy. Mild canal stenosis. Mild bilateral foraminal narrowing.     L3-L4: Minimal disc bulge. Moderate facet arthropathy. Minor canal narrowing. No significant foraminal stenosis.     L4-L5: Disc bulge. Moderate bilateral facet arthropathy. Bilateral ligamentum flavum thickening. Moderate bilateral lateral recess narrowing and mild canal stenosis. Mild bilateral foraminal narrowing.     L5-S1: Disc bulge. There is central disc protrusion. Severe bilateral facet arthropathy. There is bilateral lateral recess stenosis impacting S1 nerve roots. Mild canal stenosis. Mild bilateral foraminal narrowing.     OTHER FINDINGS: Indeterminate subcentimeter left renal T2 hyperintense cortical lesion, statistically favored to represent benign cyst.     IMPRESSION:     1.  Multilevel degenerative change without high-grade canal or foraminal stenosis as detailed, worse at level L5-S1 with bilateral lateral recess stenosis impacting S1 nerve roots. Correlate for S1 radiculopathy.  2.  Moderate bilateral lateral recess narrowing at L4-5.    BiggerPipo MD - 02/06/2023   Formatting of this note might be different from the original.   EXAM   XR L SPINE AP AND LATERAL-   2/3/2023 3:18 pm     HISTORY   Provided clinical history: \"worsening low back pain Lumbar DDD\"     TECHNIQUE   Three views of the lumbar spine were obtained.     COMPARISON   Radiographs dated March 11, 2014.     FINDINGS   Five lumbar-type vertebral bodies.  Multilevel intervertebral disc degeneration, moderate-to-severe at L1-2, L2-3, L4-5, and L5-S1.  Multilevel facet osteoarthritis, greatest near the lumbosacral junction. No fracture or aggressive osseous lesion identified.  Mild-to-moderate osteoarthritis of both hips.  Surgical clips in the right upper abdomen.     IMPRESSION   IMPRESSION   Degenerative findings as above.        "

## 2025-04-23 ENCOUNTER — DOCTOR'S OFFICE (OUTPATIENT)
Dept: URBAN - NONMETROPOLITAN AREA CLINIC 1 | Facility: CLINIC | Age: 70
Setting detail: OPHTHALMOLOGY
End: 2025-04-23
Payer: MEDICARE

## 2025-04-23 DIAGNOSIS — H40.033: ICD-10-CM

## 2025-04-23 DIAGNOSIS — H35.033: ICD-10-CM

## 2025-04-23 DIAGNOSIS — H16.222: ICD-10-CM

## 2025-04-23 DIAGNOSIS — H43.813: ICD-10-CM

## 2025-04-23 DIAGNOSIS — Z96.1: ICD-10-CM

## 2025-04-23 DIAGNOSIS — H40.013: ICD-10-CM

## 2025-04-23 DIAGNOSIS — H16.221: ICD-10-CM

## 2025-04-23 PROCEDURE — 92014 COMPRE OPH EXAM EST PT 1/>: CPT | Performed by: OPHTHALMOLOGY

## 2025-04-23 PROCEDURE — 92133 CPTRZD OPH DX IMG PST SGM ON: CPT | Performed by: OPHTHALMOLOGY

## 2025-04-23 ASSESSMENT — REFRACTION_MANIFEST
OS_SPHERE: +0.25
OD_VA2: 20/25+2
OD_AXIS: 155
OD_VA1: 20/25+2
OD_ADD: +2.50
OS_VA2: 20/30
OS_CYLINDER: -1.75
OD_CYLINDER: -2.00
OD_SPHERE: +1.25
OU_VA: 20/25+2
OS_VA1: 20/30
OS_ADD: +2.50
OS_AXIS: 155

## 2025-04-23 ASSESSMENT — PACHYMETRY
OS_CT_CORRECTION: 1
OD_CT_CORRECTION: 1
OS_CT_UM: 538
OD_CT_UM: 531

## 2025-04-23 ASSESSMENT — REFRACTION_CURRENTRX
OD_AXIS: 156
OS_AXIS: 149
OD_OVR_VA: 20/
OS_OVR_VA: 20/
OS_SPHERE: +0.25
OS_VPRISM_DIRECTION: PROGS
OD_CYLINDER: -2.00
OD_SPHERE: +1.50
OS_CYLINDER: -1.75
OD_ADD: +2.00
OD_VPRISM_DIRECTION: PROGS
OS_ADD: +2.00

## 2025-04-23 ASSESSMENT — CONFRONTATIONAL VISUAL FIELD TEST (CVF)
OS_FINDINGS: FULL
OD_FINDINGS: FULL

## 2025-04-23 ASSESSMENT — REFRACTION_AUTOREFRACTION
OS_SPHERE: +1.00
OD_AXIS: 170
OS_AXIS: 150
OD_SPHERE: +1.25
OS_CYLINDER: -1.75
OD_CYLINDER: -1.75

## 2025-04-23 ASSESSMENT — DECREASING TEAR LAKE - SEVERITY SCORE
OD_DEC_TEARLAKE: T
OS_DEC_TEARLAKE: T

## 2025-04-23 ASSESSMENT — SUPERFICIAL PUNCTATE KERATITIS (SPK)
OS_SPK: ABSENT
OD_SPK: ABSENT

## 2025-04-23 ASSESSMENT — TONOMETRY
OS_IOP_MMHG: 15
OD_IOP_MMHG: 16

## 2025-04-23 ASSESSMENT — VISUAL ACUITY
OS_BCVA: 20/30
OD_BCVA: 20/25+2

## 2025-04-23 ASSESSMENT — KERATOMETRY
OD_K1POWER_DIOPTERS: 43.75
OS_K2POWER_DIOPTERS: 47.00
METHOD_AUTO_MANUAL: MANUAL
OD_AXISANGLE_DEGREES: 062
OS_K1POWER_DIOPTERS: 45.75
OD_K2POWER_DIOPTERS: 46.75
OS_AXISANGLE_DEGREES: 081

## 2025-04-29 ENCOUNTER — HOSPITAL ENCOUNTER (OUTPATIENT)
Dept: INTERVENTIONAL RADIOLOGY/VASCULAR | Facility: HOSPITAL | Age: 70
Discharge: HOME | End: 2025-04-29
Attending: ANESTHESIOLOGY
Payer: MEDICARE

## 2025-04-29 VITALS
DIASTOLIC BLOOD PRESSURE: 69 MMHG | TEMPERATURE: 97.5 F | WEIGHT: 195 LBS | HEART RATE: 57 BPM | SYSTOLIC BLOOD PRESSURE: 128 MMHG | RESPIRATION RATE: 16 BRPM | OXYGEN SATURATION: 96 % | HEIGHT: 66 IN | BODY MASS INDEX: 31.34 KG/M2

## 2025-04-29 DIAGNOSIS — M54.16 LUMBAR RADICULOPATHY: ICD-10-CM

## 2025-04-29 PROCEDURE — 62323 NJX INTERLAMINAR LMBR/SAC: CPT | Performed by: ANESTHESIOLOGY

## 2025-04-29 RX ORDER — LIDOCAINE HYDROCHLORIDE 10 MG/ML
INJECTION, SOLUTION EPIDURAL; INFILTRATION; INTRACAUDAL; PERINEURAL AS NEEDED
Status: COMPLETED | OUTPATIENT
Start: 2025-04-29 | End: 2025-04-29

## 2025-04-29 RX ORDER — METHYLPREDNISOLONE ACETATE 80 MG/ML
INJECTION, SUSPENSION INTRA-ARTICULAR; INTRALESIONAL; INTRAMUSCULAR; PARENTERAL; SOFT TISSUE AS NEEDED
Status: COMPLETED | OUTPATIENT
Start: 2025-04-29 | End: 2025-04-29

## 2025-04-29 RX ORDER — 0.9 % SODIUM CHLORIDE 0.9 %
VIAL (ML) INJECTION AS NEEDED
Status: COMPLETED | OUTPATIENT
Start: 2025-04-29 | End: 2025-04-29

## 2025-04-29 RX ADMIN — METHYLPREDNISOLONE ACETATE 80 MG: 80 INJECTION, SUSPENSION INTRA-ARTICULAR; INTRALESIONAL; INTRAMUSCULAR; SOFT TISSUE at 07:49

## 2025-04-29 RX ADMIN — LIDOCAINE HYDROCHLORIDE 5 ML: 10 INJECTION, SOLUTION EPIDURAL; INFILTRATION; INTRACAUDAL; PERINEURAL at 07:44

## 2025-04-29 RX ADMIN — IOHEXOL 2 ML: 240 INJECTION, SOLUTION INTRATHECAL; INTRAVASCULAR; INTRAVENOUS; ORAL at 07:48

## 2025-04-29 RX ADMIN — SODIUM CHLORIDE 9 ML: 9 INJECTION INTRAMUSCULAR; INTRAVENOUS; SUBCUTANEOUS at 07:49

## 2025-04-29 NOTE — INTERVAL H&P NOTE
H&P reviewed. After examining the patient I find no changes in the patients condition since the H&P had been written.    Vitals:    04/29/25 0658   BP: 124/80   Pulse: (!) 53   Resp: 16   Temp: 97.8 °F (36.6 °C)   SpO2: 95%

## 2025-04-29 NOTE — DISCHARGE INSTR - AVS FIRST PAGE
YOUR 2 WEEK FOLLOW UP HAS BEEN SCHEDULED; IF YOU WISH TO CHANGE THE FOLLOW UP, PLEASE CALL THE [facility] AT [place]: [phone]    EPIDURAL STEROID INJECTION DISCHARGE INSTRUCTIONS  WHAT YOU NEED TO KNOW:   An epidural steroid injection (VENICE) is a procedure to inject steroid medicine into the epidural space. The epidural space is between your spinal cord and vertebrae. Steroids reduce inflammation and fluid buildup in your spine that may be causing pain. You may be given pain medicine along with the steroids.        DISCHARGE INSTRUCTIONS:   Call your local emergency number (911 in the [place]) if:   You have a seizure.    You have trouble moving your legs.    Seek care immediately if:   Blood soaks through your bandage.    You have a fever or chills, severe back pain, and the procedure area is sensitive to the touch.    You cannot control when you urinate or have a bowel movement.    Call your doctor if:   You have weakness or numbness in your legs.    Your wound is red, swollen, or draining pus.    You have nausea or are vomiting.    Your face or neck is red and you feel warm.    You have more pain than you had before the procedure.    You have swelling in your hands or feet.    You have questions or concerns about your condition or care.    Care for your wound as directed:  You may remove the bandage before you go to bed the day of your procedure. You may take a shower, but do not take a bath for at least 24 hours.   Self-care:   Do not drive,  use machines, or do strenuous activity for 24 hours after your procedure or as directed.     Continue other treatments  as directed. Steroid injections alone will not control your pain. The injections are meant to be used with other treatments, such as physical therapy.    Follow up with your doctor as directed:  Write down your questions so you remember to ask them during your visits.           ACTIVITY  Do not drive or operate machinery today.  No strenuous  activity today - bending, lifting, etc.   You may resume normal activities starting tomorrow - start slowly and as tolerated.  You may shower today, but not tub baths or hot tubs.  You may have numbness for several hours from the local anesthetics. Please use caution and common sense, especially with weight-bearing activities.    CARE OF THE INJECTION SITE  If you have soreness or pain apply ice to the area today (20 minutes on and 20 minutes off).  Starting tomorrow, you may resume normal activities  Notify the [facility] if you have any of the following: redness, drainage, swelling or fever above 100°F.      MEDICATIONS  Continue to take all routine medications.  Our office may have instructed you to hold some medications. You may restart medications, including blood thinners.

## 2025-05-09 ENCOUNTER — HOSPITAL ENCOUNTER (OUTPATIENT)
Dept: RADIOLOGY | Facility: CLINIC | Age: 70
End: 2025-05-09
Payer: MEDICARE

## 2025-05-09 VITALS — HEIGHT: 66 IN | BODY MASS INDEX: 31.18 KG/M2 | WEIGHT: 194 LBS

## 2025-05-09 DIAGNOSIS — Z12.31 ENCOUNTER FOR SCREENING MAMMOGRAM FOR MALIGNANT NEOPLASM OF BREAST: ICD-10-CM

## 2025-05-09 PROCEDURE — 77067 SCR MAMMO BI INCL CAD: CPT

## 2025-05-09 PROCEDURE — 77063 BREAST TOMOSYNTHESIS BI: CPT

## 2025-05-21 ENCOUNTER — OFFICE VISIT (OUTPATIENT)
Dept: PAIN MEDICINE | Facility: CLINIC | Age: 70
End: 2025-05-21
Payer: MEDICARE

## 2025-05-21 VITALS — WEIGHT: 192.4 LBS | BODY MASS INDEX: 30.2 KG/M2 | HEIGHT: 67 IN

## 2025-05-21 DIAGNOSIS — M54.16 LUMBAR RADICULOPATHY: ICD-10-CM

## 2025-05-21 DIAGNOSIS — M48.062 SPINAL STENOSIS OF LUMBAR REGION WITH NEUROGENIC CLAUDICATION: ICD-10-CM

## 2025-05-21 PROCEDURE — 99213 OFFICE O/P EST LOW 20 MIN: CPT | Performed by: ANESTHESIOLOGY

## 2025-05-21 PROCEDURE — G2211 COMPLEX E/M VISIT ADD ON: HCPCS | Performed by: ANESTHESIOLOGY

## 2025-05-21 RX ORDER — GABAPENTIN 300 MG/1
300 CAPSULE ORAL 3 TIMES DAILY
Qty: 270 CAPSULE | Refills: 3 | Status: SHIPPED | OUTPATIENT
Start: 2025-05-21

## 2025-05-22 NOTE — PATIENT INSTRUCTIONS
"Patient Education     Back exercises   The Basics   Written by the doctors and editors at [facility]   Why do I need to do back exercises? -- Back exercises can help ease back pain and might help prevent future back pain. Long term, it is important to strengthen the muscles in your lower back, buttocks, and belly. These are your \"core muscles.\" Stretching exercises are also important to keep your muscles flexible.  Below are some stretching and strengthening exercises that might help you. Other forms of movement can help ease or prevent back pain, too. For example, some people like to walk, do aerobic exercise, or do yoga or anh chi. The most important thing is to move your body. Your doctor, nurse, or physical therapist can help you find different types of activity that work for you.  What stretching exercises should I do? -- Below are some examples of stretching exercises. Warm up your muscles before stretching to help prevent injury. To warm up, you can walk, jog, or cycle for a few minutes.  Start by repeating each of these stretches 2 to 3 times. Try to hold each stretch for 5 to 10 seconds, and try to do the stretches 2 to 3 times each day. Breathe slowly and deeply as you do the exercises. Never bounce when doing stretches.   Cat-cow stretch (figure 1) - Start on all fours on the floor, with your hands under your shoulders, knees under your hips, and your back flat. First, tuck your chin and tighten your stomach muscles as you round your back (like a \"cat\"). Hold the stretch for about 10 seconds. Rest for a few seconds as you flatten your back. Next, lift your chin and let your belly and lower back sink toward the floor (like a \"cow\"). Hold the stretch for about 10 seconds.   Single knee-to-chest stretches (figure 2) ? While lying on your back, bend your knees with your feet flat on the floor. Pull 1 knee toward your chest until you feel a stretch in your lower back and buttock area. Lower, and repeat with the " other knee. If you have knee problems, pull your knee up by grabbing the back of your thigh instead of the front of your knee. You can also do this exercise by grabbing both knees at the same time.   Lower trunk rotations (figure 3) ? While lying on your back, bend your knees with your feet flat on the floor. Keep your knees and ankles together, and then drop them to 1 side. Keep both of your shoulders touching the floor until you feel a stretch in the muscles at the side of the back. Repeat on the other side.   Lower back stretches seated (figure 4) ? Sit in a chair with your feet spread about shoulder width apart. Then, lean forward until you feel a stretch in your lower back.  What strengthening exercises should I do? -- Below are some examples of strengthening exercises.  Start by doing each exercise 2 to 3 times. Work up to doing each exercise 10 times. Hold each exercise for 3 to 5 seconds, and try to do the exercises 2 to 3 times each day. Do all exercises slowly.   Shoulder blade squeezes (figure 5) ? Pinch your shoulder blades together on your upper back, and hold 3 to 5 seconds. You can also do these 1 side at a time. Sit with good posture, and make sure that your shoulders do not rise up when you do this exercise. Relax.   Pelvic tilts (figure 6) ? Lie on your back with your knees bent and feet flat on the floor. Tighten your stomach muscles, and press your lower back down to the floor. Relax. You should be able to breathe comfortably during this exercise.   Hip lifts (figure 7) ? Lie on your back with your knees bent and feet flat on the floor. Tighten your stomach muscles, keep your back flat, and lift your buttocks off of the floor. Relax. You should feel this in your buttocks, not in your lower back.  What else should I know?    Exercise, including stretching, might be slightly uncomfortable. But you should not have sharp or severe pain. If you do get severe pain, stop what you are doing. If severe  "pain continues, call your doctor or nurse.   Do not hold your breath when exercising. If you tend to hold your breath, try counting out loud when exercising. If any exercise bothers you, stop right away.   Always warm up before exercising. Warm muscles stretch much easier than cool muscles. Stretching cool muscles can lead to injury.   Doing exercises before a meal can be a good way to get into a routine.  All topics are updated as new evidence becomes available and our peer review process is complete.  This topic retrieved from [employer] on: Apr 03, 2024.  Topic 034318 Version 2.0  Release: 32.2.4 - C32.92  © 2024 UpToDate, Inc. and/or its affiliates. All rights reserved.  figure 1: Cat-cow stretch     Start on all fours on the floor, with hands under your shoulders, knees under your hips, and your back flat. First, tuck your chin and tighten your stomach muscles as you round your back (like a \"cat\"). Hold the stretch for about 10 seconds. Rest for a few seconds as you flatten your back. Next, lift your chin and let your belly and lower back sink toward the floor (like a \"cow\"). Hold the stretch for about 10 seconds.  Graphic [id number] Version 1.0  figure 2: Single knee-to-chest stretch     Lie on your back, bend your knees, and have your feet flat on the floor. Pull 1 knee toward your chest until you feel a stretch in your lower back and buttock area. Repeat with the other knee. If you have knee problems, pull your knee up by grabbing the back of your thigh instead of the front of your knee. You can also do this exercise by grabbing both knees at the same time.  Graphic [id number] Version 1.0  figure 3: Lower trunk rotation     While lying on your back, bend your knees and have your feet flat on the floor. Keep your legs together and then drop them to 1 side. Keep both of your shoulders touching the floor until you feel a stretch in the muscles at the side of the back. Repeat on the other side.  Graphic [id number] Version " 1.0  figure 4: Lower back stretch     Sit in a chair with your feet spread about shoulder width apart. Then, lean forward until you feel a stretch in your lower back.  Graphic [id number] Version 1.0  figure 5: Shoulder blade squeezes     Pinch your shoulder blades together on your upper back and hold for 3 to 5 seconds. Make sure that you are sitting with good posture and that your shoulders do not raise up when you do this exercise. Relax.  Graphic [id number] Version 1.0  figure 6: Pelvic tilts     Lie on your back with your knees bent and feet flat on the floor. Tighten your stomach muscles and press your lower back down to the floor. Relax.  Graphic [id number] Version 1.0  figure 7: Hip lifts     Lie on your back with your knees bent and feet flat on the floor. Tighten your stomach muscles and lift your buttocks off of the floor. Relax.  Graphic [id number] Version 1.0  Consumer Information Use and Disclaimer   Disclaimer: This generalized information is a limited summary of diagnosis, treatment, and/or medication information. It is not meant to be comprehensive and should be used as a tool to help the user understand and/or assess potential diagnostic and treatment options. It does NOT include all information about conditions, treatments, medications, side effects, or risks that may apply to a specific patient. It is not intended to be medical advice or a substitute for the medical advice, diagnosis, or treatment of a health care provider based on the health care provider's examination and assessment of a patient's specific and unique circumstances. Patients must speak with a health care provider for complete information about their health, medical questions, and treatment options, including any risks or benefits regarding use of medications. This information does not endorse any treatments or medications as safe, effective, or approved for treating a specific patient. UpToDate, Inc. and its affiliates disclaim any warranty or  liability relating to this information or the use thereof.The use of this information is governed by the Terms of Use, available at https://www.wolterskluwer.com/en/know/clinical-effectiveness-terms. 2024© UpToDate, Inc. and its affiliates and/or licensors. All rights reserved.  Copyright   © 2024 [employer] and/or its affiliates. All rights reserved.

## 2025-05-22 NOTE — PROGRESS NOTES
Assessment  1. Spinal stenosis of lumbar region with neurogenic claudication  -     gabapentin (NEURONTIN) 300 mg capsule; Take 1 capsule (300 mg total) by mouth 3 (three) times a day  2. Lumbar radiculopathy  -     gabapentin (NEURONTIN) 300 mg capsule; Take 1 capsule (300 mg total) by mouth 3 (three) times a day    Greater than 90% relief of pain with improved ability to participate with IADLs after caudal VENICE thus far; previously described proximal radicular features in left L4 and L5 dermatome and shopping cart sign. On MRI multilevel degenerative change without high-grade canal or foraminal stenosis as detailed, worse at level L5-S1 with bilateral lateral recess stenosis impacting S1 nerve roots. Correlates for S1 radiculopathy. Moderate bilateral lateral recess narrowing at L4-5 also contributory. Greater than 80% relief with prior caudal VENICE (last performed 9/21/23) and taking gabapentin without side effects. Counseled with regard to continued home PT. Previously reported the following symptomatology:    Axial low back pain described primarily by arthritic features.  Aching, nagging, indolent, stabbing, throbbing features in axial low back without radicular components.  5/5 strength bilaterally, negative SLR.  Positive facet loading maneuvers in lumbar spine elicited pain, positive tenderness to palpation over lumbar paraspinal muscles.  Findings correlate with prominent lumbar facet arthropathy seen throughout axial low back on x-ray.  Currently she is neurologically intact without gait instability, saddle anesthesia or bowel/bladder abnormality. Risks, benefits alternative to medial branch blocks and subsequent radiofrequency ablation if successful thoroughly discussed with patient.  Handouts provided questions answered to patient satisfaction.    Plan  -Caudal VENICE; f/u 2 weeks post procedure  -counseled regarding spinal stenosis with neurogenic claudication; counseled regarding red flag  sxs  -gabapentin 300 mg t.i.d. Ordered for patient; taking BID counseled regarding sedative effects of taking this medication and provided up titration calendar.  Counseled not to take medication while driving or operating heavy machinery/using stairs  -Diclofenac 75 mg b.i.d. prn pain previously prescribed.  Patient educated regarding bleeding risk of taking this medication as well as not taking any other nonsteroidal anti-inflammatory medications while taking this medication; counseled thoroughly regarding potential risk of Cardiovascular injury, Kidney injury, Gastrointestinal ulceration/bleeding. Patient voiced understanding  -script provided for formal physical therapy for lumbar facet arthropathy, radiculopathy; Physician directed home exercise plan as per [employer] demonstrated and handouts provided that patient plans to participate with for 1 hour, twice a week for the next 6 weeks.     There are risks associated with opioid medications, including dependence, addiction and tolerance. The patient understands and agrees to use these medications only as prescribed. Potential side effects of the medications include, but are not limited to, constipation, drowsiness, addiction, impaired judgment and risk of fatal overdose if not taken as prescribed. The patient was warned against driving while taking sedation medications or operating heavy machinery. The patient voiced understanding. Sharing medications is a felony. At this point in time, the patient is showing no signs of addiction, abuse, diversion or suicidal ideation.     [place] Prescription Drug Monitoring Program report was reviewed and was appropriate      Complete risks and benefits including bleeding, infection, tissue reaction, nerve injury and allergic reaction were discussed. The approach was demonstrated using models and literature was provided. Verbal and written consent was obtained.     My impressions and treatment recommendations were discussed  in detail with the patient who verbalized understanding and had no further questions.  Discharge instructions were provided. I personally saw and examined the patient and I agree with the above discussed plan of care.    New Medications Ordered This Visit   Medications    gabapentin (NEURONTIN) 300 mg capsule     Sig: Take 1 capsule (300 mg total) by mouth 3 (three) times a day     Dispense:  270 capsule     Refill:  3       History of Present Illness    Cecilia Tamayo is a 69 y.o. female with pmhx of prior cerebral aneurysm, recent TIA, XOL, obesity, presenting with chronic low back pain described primarily as arthritic in nature.  She describes 8/10 low back pain that is worse in the mornings and worse at the end of the day.  The pain is characterized by achy, nagging, indolent, crampy, stabbing pain in her axial low back.  The patient describes that the pain is worse with standing for long periods of time on hard surfaces as well as with walking.  The patient is a very active individual and feels as though this pain compromises his participation with independent activities of daily living. The pain can be debilitating at times and contribute to significant disability, compromising overall activity and independent activities of daily living.  She has completed prior PT with modest benefit.  Medications the patient has tried in the past include nsaids, tylenol. She describes no radicular symptoms and has good strength.  She denies any weakness numbness or paresthesias. The patient denies any bowel or bladder dysfunction, saddle anesthesia or gait instability as well.      I have personally reviewed and/or updated the patient's past medical history, past surgical history, family history, social history, current medications, allergies, and vital signs today.     Review of Systems   Constitutional:  Positive for activity change.   HENT: Negative.     Eyes: Negative.    Respiratory: Negative.  Negative for cough.     Cardiovascular: Negative.    Gastrointestinal: Negative.    Endocrine: Negative.    Genitourinary: Negative.    Musculoskeletal:  Positive for arthralgias, back pain and myalgias. Negative for gait problem.   Skin: Negative.    Allergic/Immunologic: Negative.    Neurological:  Positive for weakness and numbness.   Hematological: Negative.    Psychiatric/Behavioral: Negative.     All other systems reviewed and are negative.      Patient Active Problem List   Diagnosis    Chest pain    Hyperlipidemia    History of cerebral aneurysm repair    Tobacco abuse    Lumbar spondylosis    Spinal stenosis of lumbar region with neurogenic claudication    Lumbar radiculopathy    Acute right-sided weakness       Past Medical History:   Diagnosis Date    Anxiety     Cerebral aneurysm     Hyperlipidemia        Past Surgical History:   Procedure Laterality Date    BREAST CYST ASPIRATION Right 09/02/2022    cyst that abscessed    CARDIAC CATHETERIZATION      2005    CAUDAL BLOCK N/A 9/21/2023    Procedure: BLOCK / INJECTION CAUDAL;  Surgeon: Hollis Avila MD;  Location: [facility];  Service: Pain Management     CEREBRAL ANEURYSM REPAIR  07/1997    at [facility]      EPIDURAL BLOCK INJECTION N/A 8/3/2023    Procedure: L5-S1 ILESI;  Surgeon: Hollis Avila MD;  Location: [facility];  Service: Pain Management     IR SPINE AND PAIN PROCEDURE  4/29/2025    NERVE BLOCK Bilateral 5/11/2023    Procedure: BLOCK MEDIAL BRANCH L3, L4, L5 #1;  Surgeon: Hollis Avila MD;  Location: [facility];  Service: Pain Management     NERVE BLOCK Bilateral 5/30/2023    Procedure: BLOCK MEDIAL BRANCH L3, L4, L5 #2;  Surgeon: Hollis Avila MD;  Location: [facility];  Service: Pain Management     RHIZOTOMY Bilateral 6/15/2023    Procedure: RHIZOTOMY LUMBAR Medial branch nerves L3, L4, L5;  Surgeon: Hollis Avila MD;  Location: [facility];  Service: Pain Management        Family History   Problem Relation Name Age of Onset    No Known Problems  Mother      Prostate cancer Father      No Known Problems Daughter      No Known Problems Daughter      No Known Problems Maternal Grandmother      No Known Problems Maternal Grandfather      No Known Problems Paternal Grandmother      No Known Problems Paternal Grandfather      No Known Problems Paternal Aunt         Social History     Occupational History    Not on file   Tobacco Use    Smoking status: Former     Current packs/day: 0.00     Average packs/day: 0.3 packs/day for 25.0 years (6.3 ttl pk-yrs)     Types: Cigarettes     Quit date: 2015     Years since quitting: 10.3    Smokeless tobacco: Never   Vaping Use    Vaping status: Never Used   Substance and Sexual Activity    Alcohol use: Never    Drug use: Never     Comment: during college    Sexual activity: Not on file       Current Outpatient Medications on File Prior to Visit   Medication Sig    ALPRAZolam (XANAX) 1 mg tablet Take 1 mg by mouth as needed in the morning and 1 mg as needed at noon and 1 mg as needed in the evening for anxiety. Pt states, takes for anxiety prn .    Ascorbic Acid (vitamin C) 1000 MG tablet Take 1,000 mg by mouth in the morning.    aspirin (ECOTRIN LOW STRENGTH) 81 mg EC tablet Take 1 tablet (81 mg total) by mouth daily    B Complex Vitamins (VITAMIN B COMPLEX) TABS Take 2 tablets by mouth in the morning.    Cholecalciferol 125 MCG (5000 UT) capsule Take 5,000 Units by mouth in the morning.    Collagen-Vitamin C (Super Collagen Plus Vitamin C) 1000-10 MG TABS Take by mouth    diclofenac (VOLTAREN) 75 mg EC tablet Take 75 mg by mouth 2 (two) times a day (Patient taking differently: Take 75 mg by mouth in the morning and 75 mg in the evening. As needed.)    FLUoxetine (PROzac) 40 MG capsule Take by mouth in the morning.    levothyroxine 137 mcg tablet Take 137 mcg by mouth in the morning.    losartan (COZAAR) 50 mg tablet Take 50 mg by mouth every morning    magnesium gluconate (MAGONATE) 500 mg tablet Take 500 mg by mouth in the  "morning.    Multiple Vitamins-Minerals (CENTRUM SILVER ULTRA WOMENS) TABS Take 1 tablet by mouth in the morning.    pantoprazole (PROTONIX) 40 mg tablet Take 40 mg by mouth in the morning and 40 mg before bedtime.    Progesterone 200 MG CAPS Take 1 capsule by mouth every evening    ramelteon (ROZEREM) 8 mg tablet 8 mg daily at bedtime  (Patient taking differently: 8 mg daily at bedtime As needed)    rosuvastatin (CRESTOR) 20 MG tablet Take 20 mg by mouth in the morning.    solifenacin (VESICARE) 10 MG tablet Take 10 mg by mouth every morning    tiZANidine (ZANAFLEX) 4 mg tablet Take 1 tablet by mouth every 6 (six) hours as needed    traZODone (DESYREL) 50 mg tablet Take 50 mg by mouth daily at bedtime    valACYclovir (VALTREX) 1,000 mg tablet TAKE 2 TABLETS BY MOUTH EVERY 12 HOURS AS DIRECTED (Patient taking differently: As needed)    vitamin A 10,000 units capsule Take 10,000 Units by mouth in the morning.    vitamin E, tocopherol, 400 units capsule Take 400 Units by mouth in the morning.    [DISCONTINUED] gabapentin (NEURONTIN) 300 mg capsule TAKE 1 CAPSULE BY MOUTH THREE TIMES A DAY    [DISCONTINUED] clopidogrel (PLAVIX) 75 mg tablet Take 1 tablet (75 mg total) by mouth daily for 21 days     No current facility-administered medications on file prior to visit.       Allergies   Allergen Reactions    Zolpidem Other (See Comments)     Felt like speeding.    Other Reaction(s): Other (See Comments)      Felt like speeding.       Felt like speeding.    Phenylephrine-Acetaminophen Other (See Comments)     Heart races    Other Reaction(s): Other (see comments)      Heart races    Other Reaction(s): Other (See Comments)      Heart races  Other Reaction(s): Other (see comments)  Heart races     Physical Exam    Ht 5' 7\" (1.702 m)   Wt 87.3 kg (192 lb 6.4 oz)   LMP  (LMP Unknown)   BMI 30.13 kg/m²     Constitutional: normal, well developed, well nourished, alert, in no distress and non-toxic and no overt pain behavior. " and obese  Eyes: anicteric  HEENT: grossly intact  Neck: supple, symmetric, trachea midline and no masses   Pulmonary:even and unlabored  Cardiovascular:No edema or pitting edema present  Skin:Normal without rashes or lesions and well hydrated  Psychiatric:Mood and affect appropriate  Neurologic:Cranial Nerves II-XII grossly intact Sensation grossly intact; no clonus negative morales's. Reflexes 2+ and brisk. SLR negative bilaterally.   Musculoskeletal:normal gait. 5/5 strength bilaterally with AROM in lower extremities. Normal heel toe and tip toe walking. Significant pain with lumbar facet loading bilaterally and with lateral spine rotation. ttp over lumbar paraspinal muscles. Negative alyson's test, negative gaenslen's negative SIJ loading bilaterally.    Imaging    MRI LUMBAR SPINE WITHOUT CONTRAST     INDICATION: M47.816: Spondylosis without myelopathy or radiculopathy, lumbar region  M54.16: Radiculopathy, lumbar region.     COMPARISON:  None.     TECHNIQUE:  Multiplanar, multisequence imaging of the lumbar spine was performed. .        IMAGE QUALITY:  Diagnostic     FINDINGS:     VERTEBRAL BODIES:  There are 5 lumbar type vertebral bodies. There is preserved normal lumbar lordosis. Minimal retrolisthesis at L5-S1.     There is Modic type I endplate degenerative signal change at L1-2. Modic type II endplate degenerative signal change at levels L4-5 and L5-S1. Scattered endplate Schmorl's nodes.     SACRUM:  Normal signal within the sacrum. No evidence of insufficiency or stress fracture.     DISTAL CORD AND CONUS:  Normal size and signal within the distal cord and conus.     PARASPINAL SOFT TISSUES:  Paraspinal soft tissues are unremarkable.     LOWER THORACIC DISC SPACES: Mild noncompressive lower thoracic degenerative change.     LUMBAR DISC SPACES:     L1-L2: Disc bulge. There is left foraminal disc protrusion. Moderate bilateral facet arthropathy and ligamentum flavum thickening. Mild canal stenosis. Mild  "left foraminal narrowing.     L2-L3: Disc bulge. Moderate facet arthropathy. Mild canal stenosis. Mild bilateral foraminal narrowing.     L3-L4: Minimal disc bulge. Moderate facet arthropathy. Minor canal narrowing. No significant foraminal stenosis.     L4-L5: Disc bulge. Moderate bilateral facet arthropathy. Bilateral ligamentum flavum thickening. Moderate bilateral lateral recess narrowing and mild canal stenosis. Mild bilateral foraminal narrowing.     L5-S1: Disc bulge. There is central disc protrusion. Severe bilateral facet arthropathy. There is bilateral lateral recess stenosis impacting S1 nerve roots. Mild canal stenosis. Mild bilateral foraminal narrowing.     OTHER FINDINGS: Indeterminate subcentimeter left renal T2 hyperintense cortical lesion, statistically favored to represent benign cyst.     IMPRESSION:     1.  Multilevel degenerative change without high-grade canal or foraminal stenosis as detailed, worse at level L5-S1 with bilateral lateral recess stenosis impacting S1 nerve roots. Correlate for S1 radiculopathy.  2.  Moderate bilateral lateral recess narrowing at L4-5.    Bigger, Pipo Nicole MD - 02/06/2023   Formatting of this note might be different from the original.   EXAM   XR L SPINE AP AND LATERAL-   2/3/2023 3:18 pm     HISTORY   Provided clinical history: \"worsening low back pain Lumbar DDD\"     TECHNIQUE   Three views of the lumbar spine were obtained.     COMPARISON   Radiographs dated March 11, 2014.     FINDINGS   Five lumbar-type vertebral bodies.  Multilevel intervertebral disc degeneration, moderate-to-severe at L1-2, L2-3, L4-5, and L5-S1.  Multilevel facet osteoarthritis, greatest near the lumbosacral junction. No fracture or aggressive osseous lesion identified.  Mild-to-moderate osteoarthritis of both hips.  Surgical clips in the right upper abdomen.     IMPRESSION   IMPRESSION   Degenerative findings as above.        "

## 2025-05-28 DIAGNOSIS — Z12.31 ENCOUNTER FOR SCREENING MAMMOGRAM FOR MALIGNANT NEOPLASM OF BREAST: ICD-10-CM

## 2025-08-12 ENCOUNTER — OFFICE VISIT (OUTPATIENT)
Dept: URGENT CARE | Facility: CLINIC | Age: 70
End: 2025-08-12
Payer: MEDICARE

## (undated) DEVICE — SYRINGE LOR 8ML PLASTIC LL

## (undated) DEVICE — SYRINGE 5ML LL

## (undated) DEVICE — DRAPE SHEET THREE QUARTER

## (undated) DEVICE — FLEXIBLE ADHESIVE BANDAGE,X-LARGE: Brand: CURITY

## (undated) DEVICE — NEEDLE BLUNT 18 G X 1 1/2IN

## (undated) DEVICE — SYRINGE 10ML LL

## (undated) DEVICE — DRAPE TOWEL: Brand: CONVERTORS

## (undated) DEVICE — GLOVE SRG LF STRL BGL SKNSNS 7.5 PF

## (undated) DEVICE — NEEDLE 18 G X 1 1/2 SAFETY

## (undated) DEVICE — PLASTIC ADHESIVE BANDAGE: Brand: CURITY

## (undated) DEVICE — NEEDLE 25G X 1 1/2

## (undated) DEVICE — GAUZE SPONGES,16 PLY: Brand: CURITY

## (undated) DEVICE — STERILE LATEX POWDER-FREE SURGICAL GLOVESWITH NITRILE COATING: Brand: PROTEXIS

## (undated) DEVICE — UTILITY MARKER,BLACK WITH LABELS: Brand: DEVON

## (undated) DEVICE — SKIN MARKER DUAL TIP WITH RULER CAP, FLEXIBLE RULER AND LABELS: Brand: DEVON

## (undated) DEVICE — GLOVE INDICATOR PI UNDERGLOVE SZ 7.5 BLUE

## (undated) DEVICE — GAUZE SPONGES,USP TYPE VII GAUZE, 12 PLY: Brand: CURITY

## (undated) DEVICE — CHLORAPREP HI-LITE 10.5ML ORANGE

## (undated) DEVICE — Device

## (undated) DEVICE — Device: Brand: MEDEX

## (undated) DEVICE — ELECTRODE RF 10CM

## (undated) DEVICE — NEEDLE SPINAL 22G X 3.5IN  QUINCKE

## (undated) DEVICE — IV EXTENSION TUBING SMALL BORE

## (undated) DEVICE — PAD GROUNDING IONIC RF DISP

## (undated) DEVICE — TRAY EPID CONT PERIFIX 18G X 3.5IN 10ML CLSD TIP